# Patient Record
Sex: MALE | Race: WHITE | Employment: FULL TIME | URBAN - METROPOLITAN AREA
[De-identification: names, ages, dates, MRNs, and addresses within clinical notes are randomized per-mention and may not be internally consistent; named-entity substitution may affect disease eponyms.]

---

## 2017-01-03 ENCOUNTER — TELEPHONE (OUTPATIENT)
Dept: BARIATRICS/WEIGHT MGMT | Age: 42
End: 2017-01-03

## 2017-01-27 ENCOUNTER — TELEPHONE (OUTPATIENT)
Dept: BARIATRICS/WEIGHT MGMT | Age: 42
End: 2017-01-27

## 2017-02-03 ENCOUNTER — OFFICE VISIT (OUTPATIENT)
Dept: BARIATRICS/WEIGHT MGMT | Age: 42
End: 2017-02-03

## 2017-02-03 VITALS
WEIGHT: 315 LBS | DIASTOLIC BLOOD PRESSURE: 78 MMHG | SYSTOLIC BLOOD PRESSURE: 136 MMHG | HEART RATE: 100 BPM | HEIGHT: 68 IN | BODY MASS INDEX: 47.74 KG/M2

## 2017-02-03 DIAGNOSIS — E66.01 MORBID OBESITY WITH BMI OF 50.0-59.9, ADULT (HCC): Primary | ICD-10-CM

## 2017-02-03 DIAGNOSIS — F33.41 RECURRENT MAJOR DEPRESSIVE DISORDER, IN PARTIAL REMISSION (HCC): ICD-10-CM

## 2017-02-03 DIAGNOSIS — I10 HTN (HYPERTENSION), BENIGN: ICD-10-CM

## 2017-02-03 DIAGNOSIS — E78.5 HYPERLIPIDEMIA, UNSPECIFIED HYPERLIPIDEMIA TYPE: ICD-10-CM

## 2017-02-03 DIAGNOSIS — G47.33 OBSTRUCTIVE SLEEP APNEA (ADULT) (PEDIATRIC): ICD-10-CM

## 2017-02-03 DIAGNOSIS — Z79.899 HIGH RISK MEDICATIONS (NOT ANTICOAGULANTS) LONG-TERM USE: ICD-10-CM

## 2017-02-03 PROCEDURE — 93000 ELECTROCARDIOGRAM COMPLETE: CPT | Performed by: FAMILY MEDICINE

## 2017-02-03 PROCEDURE — 99204 OFFICE O/P NEW MOD 45 MIN: CPT | Performed by: FAMILY MEDICINE

## 2017-02-03 ASSESSMENT — ENCOUNTER SYMPTOMS
RESPIRATORY NEGATIVE: 1
BACK PAIN: 1
EYES NEGATIVE: 1
GASTROINTESTINAL NEGATIVE: 1

## 2017-02-07 DIAGNOSIS — I10 HTN (HYPERTENSION), BENIGN: ICD-10-CM

## 2017-02-07 DIAGNOSIS — E78.5 HYPERLIPIDEMIA, UNSPECIFIED HYPERLIPIDEMIA TYPE: ICD-10-CM

## 2017-02-07 DIAGNOSIS — E66.01 MORBID OBESITY WITH BMI OF 50.0-59.9, ADULT (HCC): ICD-10-CM

## 2017-02-07 LAB
A/G RATIO: 1.5 (ref 1.1–2.2)
ALBUMIN SERPL-MCNC: 4 G/DL (ref 3.4–5)
ALP BLD-CCNC: 103 U/L (ref 40–129)
ALT SERPL-CCNC: 56 U/L (ref 10–40)
ANION GAP SERPL CALCULATED.3IONS-SCNC: 14 MMOL/L (ref 3–16)
AST SERPL-CCNC: 31 U/L (ref 15–37)
BASOPHILS ABSOLUTE: 0.1 K/UL (ref 0–0.2)
BASOPHILS RELATIVE PERCENT: 0.7 %
BILIRUB SERPL-MCNC: 0.4 MG/DL (ref 0–1)
BUN BLDV-MCNC: 19 MG/DL (ref 7–20)
CALCIUM SERPL-MCNC: 9.7 MG/DL (ref 8.3–10.6)
CHLORIDE BLD-SCNC: 99 MMOL/L (ref 99–110)
CHOLESTEROL, TOTAL: 184 MG/DL (ref 0–199)
CO2: 24 MMOL/L (ref 21–32)
CREAT SERPL-MCNC: 0.9 MG/DL (ref 0.9–1.3)
EOSINOPHILS ABSOLUTE: 0.2 K/UL (ref 0–0.6)
EOSINOPHILS RELATIVE PERCENT: 2.1 %
FOLATE: 11.37 NG/ML (ref 4.78–24.2)
GFR AFRICAN AMERICAN: >60
GFR NON-AFRICAN AMERICAN: >60
GLOBULIN: 2.6 G/DL
GLUCOSE BLD-MCNC: 105 MG/DL (ref 70–99)
HCT VFR BLD CALC: 41.1 % (ref 40.5–52.5)
HDLC SERPL-MCNC: 40 MG/DL (ref 40–60)
HEMOGLOBIN: 13.5 G/DL (ref 13.5–17.5)
LDL CHOLESTEROL CALCULATED: 119 MG/DL
LYMPHOCYTES ABSOLUTE: 1.7 K/UL (ref 1–5.1)
LYMPHOCYTES RELATIVE PERCENT: 17.3 %
MCH RBC QN AUTO: 28.2 PG (ref 26–34)
MCHC RBC AUTO-ENTMCNC: 32.8 G/DL (ref 31–36)
MCV RBC AUTO: 86 FL (ref 80–100)
MONOCYTES ABSOLUTE: 0.6 K/UL (ref 0–1.3)
MONOCYTES RELATIVE PERCENT: 6.2 %
NEUTROPHILS ABSOLUTE: 7.2 K/UL (ref 1.7–7.7)
NEUTROPHILS RELATIVE PERCENT: 73.7 %
PDW BLD-RTO: 16.8 % (ref 12.4–15.4)
PLATELET # BLD: 262 K/UL (ref 135–450)
PMV BLD AUTO: 7.7 FL (ref 5–10.5)
POTASSIUM SERPL-SCNC: 4.1 MMOL/L (ref 3.5–5.1)
RBC # BLD: 4.78 M/UL (ref 4.2–5.9)
SODIUM BLD-SCNC: 137 MMOL/L (ref 136–145)
TOTAL PROTEIN: 6.6 G/DL (ref 6.4–8.2)
TRIGL SERPL-MCNC: 125 MG/DL (ref 0–150)
TSH REFLEX: 3.01 UIU/ML (ref 0.27–4.2)
VITAMIN B-12: 485 PG/ML (ref 211–911)
VITAMIN D 25-HYDROXY: 8.6 NG/ML
VLDLC SERPL CALC-MCNC: 25 MG/DL
WBC # BLD: 9.7 K/UL (ref 4–11)

## 2017-02-08 LAB
ESTIMATED AVERAGE GLUCOSE: 122.6 MG/DL
HBA1C MFR BLD: 5.9 %

## 2017-02-16 ENCOUNTER — TELEPHONE (OUTPATIENT)
Dept: BARIATRICS/WEIGHT MGMT | Age: 42
End: 2017-02-16

## 2017-02-17 DIAGNOSIS — F41.8 DEPRESSION WITH ANXIETY: ICD-10-CM

## 2017-02-23 ENCOUNTER — OFFICE VISIT (OUTPATIENT)
Dept: BARIATRICS/WEIGHT MGMT | Age: 42
End: 2017-02-23

## 2017-02-23 VITALS
WEIGHT: 315 LBS | SYSTOLIC BLOOD PRESSURE: 128 MMHG | HEART RATE: 88 BPM | HEIGHT: 68 IN | BODY MASS INDEX: 47.74 KG/M2 | DIASTOLIC BLOOD PRESSURE: 82 MMHG

## 2017-02-23 DIAGNOSIS — F33.41 RECURRENT MAJOR DEPRESSIVE DISORDER, IN PARTIAL REMISSION (HCC): ICD-10-CM

## 2017-02-23 DIAGNOSIS — E66.01 MORBID OBESITY WITH BMI OF 50.0-59.9, ADULT (HCC): Primary | ICD-10-CM

## 2017-02-23 DIAGNOSIS — R73.03 PREDIABETES: ICD-10-CM

## 2017-02-23 DIAGNOSIS — E55.9 VITAMIN D DEFICIENCY: ICD-10-CM

## 2017-02-23 DIAGNOSIS — I10 HTN (HYPERTENSION), BENIGN: ICD-10-CM

## 2017-02-23 PROCEDURE — 99214 OFFICE O/P EST MOD 30 MIN: CPT | Performed by: FAMILY MEDICINE

## 2017-02-23 ASSESSMENT — ENCOUNTER SYMPTOMS
RESPIRATORY NEGATIVE: 1
GASTROINTESTINAL NEGATIVE: 1
EYES NEGATIVE: 1

## 2017-03-10 ENCOUNTER — OFFICE VISIT (OUTPATIENT)
Dept: BARIATRICS/WEIGHT MGMT | Age: 42
End: 2017-03-10

## 2017-03-10 VITALS
HEIGHT: 68 IN | HEART RATE: 97 BPM | WEIGHT: 315 LBS | SYSTOLIC BLOOD PRESSURE: 130 MMHG | DIASTOLIC BLOOD PRESSURE: 82 MMHG | RESPIRATION RATE: 16 BRPM | BODY MASS INDEX: 47.74 KG/M2

## 2017-03-10 DIAGNOSIS — F33.41 RECURRENT MAJOR DEPRESSIVE DISORDER, IN PARTIAL REMISSION (HCC): ICD-10-CM

## 2017-03-10 DIAGNOSIS — I10 HTN (HYPERTENSION), BENIGN: ICD-10-CM

## 2017-03-10 DIAGNOSIS — E66.01 MORBID OBESITY WITH BMI OF 50.0-59.9, ADULT (HCC): Primary | ICD-10-CM

## 2017-03-10 PROCEDURE — 99214 OFFICE O/P EST MOD 30 MIN: CPT | Performed by: FAMILY MEDICINE

## 2017-03-10 ASSESSMENT — ENCOUNTER SYMPTOMS
GASTROINTESTINAL NEGATIVE: 1
EYES NEGATIVE: 1
RESPIRATORY NEGATIVE: 1

## 2017-04-04 ENCOUNTER — OFFICE VISIT (OUTPATIENT)
Dept: BARIATRICS/WEIGHT MGMT | Age: 42
End: 2017-04-04

## 2017-04-04 VITALS
HEART RATE: 100 BPM | DIASTOLIC BLOOD PRESSURE: 82 MMHG | WEIGHT: 315 LBS | SYSTOLIC BLOOD PRESSURE: 136 MMHG | BODY MASS INDEX: 47.74 KG/M2 | HEIGHT: 68 IN

## 2017-04-04 DIAGNOSIS — Z71.3 DIETARY COUNSELING AND SURVEILLANCE: ICD-10-CM

## 2017-04-04 DIAGNOSIS — I10 HTN (HYPERTENSION), BENIGN: ICD-10-CM

## 2017-04-04 DIAGNOSIS — E66.01 MORBID OBESITY WITH BMI OF 45.0-49.9, ADULT (HCC): Primary | ICD-10-CM

## 2017-04-04 PROCEDURE — 99213 OFFICE O/P EST LOW 20 MIN: CPT | Performed by: FAMILY MEDICINE

## 2017-04-04 ASSESSMENT — ENCOUNTER SYMPTOMS
RESPIRATORY NEGATIVE: 1
GASTROINTESTINAL NEGATIVE: 1
EYES NEGATIVE: 1

## 2017-06-01 ENCOUNTER — OFFICE VISIT (OUTPATIENT)
Dept: FAMILY MEDICINE CLINIC | Age: 42
End: 2017-06-01

## 2017-06-01 ENCOUNTER — TELEPHONE (OUTPATIENT)
Dept: FAMILY MEDICINE CLINIC | Age: 42
End: 2017-06-01

## 2017-06-01 VITALS
OXYGEN SATURATION: 98 % | RESPIRATION RATE: 16 BRPM | TEMPERATURE: 97.9 F | WEIGHT: 315 LBS | HEART RATE: 118 BPM | SYSTOLIC BLOOD PRESSURE: 126 MMHG | BODY MASS INDEX: 47.74 KG/M2 | DIASTOLIC BLOOD PRESSURE: 80 MMHG | HEIGHT: 68 IN

## 2017-06-01 DIAGNOSIS — I10 ESSENTIAL HYPERTENSION: Primary | Chronic | ICD-10-CM

## 2017-06-01 DIAGNOSIS — E66.01 MORBID OBESITY DUE TO EXCESS CALORIES (HCC): ICD-10-CM

## 2017-06-01 DIAGNOSIS — F41.8 DEPRESSION WITH ANXIETY: ICD-10-CM

## 2017-06-01 DIAGNOSIS — E55.9 VITAMIN D INSUFFICIENCY: ICD-10-CM

## 2017-06-01 DIAGNOSIS — R73.03 PRE-DIABETES: ICD-10-CM

## 2017-06-01 DIAGNOSIS — E78.5 HYPERLIPIDEMIA, UNSPECIFIED HYPERLIPIDEMIA TYPE: Chronic | ICD-10-CM

## 2017-06-01 PROCEDURE — 99214 OFFICE O/P EST MOD 30 MIN: CPT | Performed by: FAMILY MEDICINE

## 2017-06-01 RX ORDER — LOSARTAN POTASSIUM AND HYDROCHLOROTHIAZIDE 25; 100 MG/1; MG/1
TABLET ORAL
Qty: 90 TABLET | Refills: 1 | Status: SHIPPED | OUTPATIENT
Start: 2017-06-01 | End: 2017-12-08 | Stop reason: SDUPTHER

## 2017-06-01 RX ORDER — MULTIVIT-MIN/IRON/FOLIC ACID/K 18-600-40
CAPSULE ORAL
Qty: 30 CAPSULE | Refills: 0
Start: 2017-06-01 | End: 2019-04-26 | Stop reason: SDUPTHER

## 2017-06-01 RX ORDER — BUPROPION HYDROCHLORIDE 150 MG/1
150 TABLET, EXTENDED RELEASE ORAL 2 TIMES DAILY
Qty: 180 TABLET | Refills: 1 | Status: SHIPPED | OUTPATIENT
Start: 2017-06-01 | End: 2018-01-02 | Stop reason: SDUPTHER

## 2017-06-02 ENCOUNTER — TELEPHONE (OUTPATIENT)
Dept: BARIATRICS/WEIGHT MGMT | Age: 42
End: 2017-06-02

## 2017-06-02 PROBLEM — E66.01 MORBID OBESITY DUE TO EXCESS CALORIES (HCC): Status: ACTIVE | Noted: 2017-06-02

## 2017-06-02 ASSESSMENT — ENCOUNTER SYMPTOMS
VISUAL CHANGE: 0
SHORTNESS OF BREATH: 0
ORTHOPNEA: 0
BLURRED VISION: 0
ABDOMINAL PAIN: 0

## 2017-06-22 ENCOUNTER — OFFICE VISIT (OUTPATIENT)
Dept: FAMILY MEDICINE CLINIC | Age: 42
End: 2017-06-22

## 2017-06-22 VITALS
WEIGHT: 315 LBS | TEMPERATURE: 97.6 F | OXYGEN SATURATION: 98 % | DIASTOLIC BLOOD PRESSURE: 86 MMHG | HEART RATE: 96 BPM | BODY MASS INDEX: 47.74 KG/M2 | HEIGHT: 68 IN | RESPIRATION RATE: 16 BRPM | SYSTOLIC BLOOD PRESSURE: 134 MMHG

## 2017-06-22 DIAGNOSIS — A04.72 C. DIFFICILE DIARRHEA: Primary | ICD-10-CM

## 2017-06-22 PROCEDURE — 99214 OFFICE O/P EST MOD 30 MIN: CPT | Performed by: FAMILY MEDICINE

## 2017-06-22 RX ORDER — METRONIDAZOLE 500 MG/1
500 TABLET ORAL 3 TIMES DAILY
COMMUNITY
End: 2018-01-17 | Stop reason: ALTCHOICE

## 2017-06-22 ASSESSMENT — ENCOUNTER SYMPTOMS
SHORTNESS OF BREATH: 0
WHEEZING: 0
TROUBLE SWALLOWING: 0
ABDOMINAL PAIN: 1
COUGH: 0
NAUSEA: 0
VOMITING: 0
CONSTIPATION: 0
ANAL BLEEDING: 0
BLOOD IN STOOL: 0
CHEST TIGHTNESS: 0
VOICE CHANGE: 0
DIARRHEA: 1
BACK PAIN: 0
ABDOMINAL DISTENTION: 0
FLATUS: 1
BLOATING: 1

## 2017-08-25 ENCOUNTER — TELEPHONE (OUTPATIENT)
Dept: BARIATRICS/WEIGHT MGMT | Age: 42
End: 2017-08-25

## 2017-08-25 ENCOUNTER — TELEPHONE (OUTPATIENT)
Dept: FAMILY MEDICINE CLINIC | Age: 42
End: 2017-08-25

## 2017-08-29 ENCOUNTER — OFFICE VISIT (OUTPATIENT)
Dept: FAMILY MEDICINE CLINIC | Age: 42
End: 2017-08-29

## 2017-08-29 VITALS
HEIGHT: 67 IN | WEIGHT: 315 LBS | DIASTOLIC BLOOD PRESSURE: 82 MMHG | TEMPERATURE: 97.9 F | HEART RATE: 104 BPM | SYSTOLIC BLOOD PRESSURE: 136 MMHG | OXYGEN SATURATION: 98 % | BODY MASS INDEX: 49.44 KG/M2 | RESPIRATION RATE: 16 BRPM

## 2017-08-29 DIAGNOSIS — R60.9 EDEMA, UNSPECIFIED TYPE: ICD-10-CM

## 2017-08-29 DIAGNOSIS — F43.21 GRIEVING: ICD-10-CM

## 2017-08-29 DIAGNOSIS — E55.9 VITAMIN D DEFICIENCY: ICD-10-CM

## 2017-08-29 DIAGNOSIS — I10 BENIGN ESSENTIAL HTN: Primary | ICD-10-CM

## 2017-08-29 DIAGNOSIS — R73.03 PRE-DIABETES: ICD-10-CM

## 2017-08-29 DIAGNOSIS — Z11.4 SCREENING FOR HIV WITHOUT PRESENCE OF RISK FACTORS: ICD-10-CM

## 2017-08-29 DIAGNOSIS — Z23 NEED FOR PROPHYLACTIC VACCINATION AGAINST DIPHTHERIA-TETANUS-PERTUSSIS (DTP): ICD-10-CM

## 2017-08-29 LAB
ANION GAP SERPL CALCULATED.3IONS-SCNC: 16 MMOL/L (ref 3–16)
BUN BLDV-MCNC: 20 MG/DL (ref 7–20)
CALCIUM SERPL-MCNC: 9.9 MG/DL (ref 8.3–10.6)
CHLORIDE BLD-SCNC: 101 MMOL/L (ref 99–110)
CO2: 24 MMOL/L (ref 21–32)
CREAT SERPL-MCNC: 0.9 MG/DL (ref 0.9–1.3)
GFR AFRICAN AMERICAN: >60
GFR NON-AFRICAN AMERICAN: >60
GLUCOSE BLD-MCNC: 125 MG/DL (ref 70–99)
POTASSIUM SERPL-SCNC: 4.2 MMOL/L (ref 3.5–5.1)
SODIUM BLD-SCNC: 141 MMOL/L (ref 136–145)
VITAMIN D 25-HYDROXY: 27.5 NG/ML

## 2017-08-29 PROCEDURE — 90715 TDAP VACCINE 7 YRS/> IM: CPT | Performed by: FAMILY MEDICINE

## 2017-08-29 PROCEDURE — 99214 OFFICE O/P EST MOD 30 MIN: CPT | Performed by: FAMILY MEDICINE

## 2017-08-29 PROCEDURE — 90471 IMMUNIZATION ADMIN: CPT | Performed by: FAMILY MEDICINE

## 2017-08-29 RX ORDER — FUROSEMIDE 20 MG/1
20 TABLET ORAL DAILY
Qty: 20 TABLET | Refills: 3 | Status: SHIPPED | OUTPATIENT
Start: 2017-08-29 | End: 2018-01-17 | Stop reason: ALTCHOICE

## 2017-08-29 ASSESSMENT — ENCOUNTER SYMPTOMS
ORTHOPNEA: 0
SHORTNESS OF BREATH: 0
CHANGE IN BOWEL HABIT: 0
ABDOMINAL PAIN: 0
BLURRED VISION: 0
VOMITING: 0

## 2017-08-30 LAB
ESTIMATED AVERAGE GLUCOSE: 114 MG/DL
HBA1C MFR BLD: 5.6 %
HIV-1 AND HIV-2 ANTIBODIES: NORMAL

## 2017-11-03 ENCOUNTER — TELEPHONE (OUTPATIENT)
Dept: FAMILY MEDICINE CLINIC | Age: 42
End: 2017-11-03

## 2017-11-03 NOTE — TELEPHONE ENCOUNTER
Pt has been informed. He will try Brat diet first, he will call back in morning at 8:30 if not feeling any better. Pt was informed of Saturday hrs.

## 2017-11-04 ENCOUNTER — OFFICE VISIT (OUTPATIENT)
Dept: FAMILY MEDICINE CLINIC | Age: 42
End: 2017-11-04

## 2017-11-04 VITALS
HEART RATE: 106 BPM | HEIGHT: 68 IN | DIASTOLIC BLOOD PRESSURE: 80 MMHG | SYSTOLIC BLOOD PRESSURE: 138 MMHG | OXYGEN SATURATION: 98 % | WEIGHT: 315 LBS | BODY MASS INDEX: 47.74 KG/M2 | TEMPERATURE: 98.9 F

## 2017-11-04 DIAGNOSIS — A08.4 VIRAL GASTROENTERITIS: Primary | ICD-10-CM

## 2017-11-04 PROCEDURE — 99214 OFFICE O/P EST MOD 30 MIN: CPT | Performed by: FAMILY MEDICINE

## 2017-11-04 ASSESSMENT — ENCOUNTER SYMPTOMS
SHORTNESS OF BREATH: 0
DIARRHEA: 1
FLATUS: 0
ABDOMINAL PAIN: 0
COLOR CHANGE: 0
ANAL BLEEDING: 0
COUGH: 0
NAUSEA: 1
ABDOMINAL DISTENTION: 0
BLOATING: 0
BLOOD IN STOOL: 0
VOMITING: 1

## 2017-11-04 NOTE — PROGRESS NOTES
Subjective:      Patient ID: Kailyn Ramey is a 40 y.o. male. Diarrhea    This is a new problem. Episode onset: 3 days. The problem occurs 2 to 4 times per day. The problem has been gradually improving. The stool consistency is described as watery. The patient states that diarrhea does not awaken him from sleep. Associated symptoms include a fever, myalgias and vomiting (one episode nbnb). Pertinent negatives include no abdominal pain, arthralgias, bloating, chills, coughing, headaches, increased  flatus, sweats, URI or weight loss. Nothing aggravates the symptoms. Risk factors include ill contacts (hx of c diff diarrhea). He has tried nothing for the symptoms. Review of Systems   Constitutional: Positive for fever. Negative for activity change, appetite change, chills and weight loss. Respiratory: Negative for cough and shortness of breath. Cardiovascular: Negative for palpitations. Gastrointestinal: Positive for diarrhea, nausea and vomiting (one episode nbnb). Negative for abdominal distention, abdominal pain, anal bleeding, bloating, blood in stool and flatus. Musculoskeletal: Positive for myalgias. Negative for arthralgias. Skin: Negative for color change and pallor. Neurological: Negative for headaches. Psychiatric/Behavioral: Negative for sleep disturbance. has No Known Allergies.       Current Outpatient Prescriptions:     metFORMIN (GLUCOPHAGE) 500 MG tablet, Take 1 tablet by mouth daily (with breakfast), Disp: 90 tablet, Rfl: 1    buPROPion (WELLBUTRIN SR) 150 MG extended release tablet, Take 1 tablet by mouth 2 times daily, Disp: 180 tablet, Rfl: 1    sertraline (ZOLOFT) 50 MG tablet, TAKE 1 TABLET BY MOUTH DAILY, Disp: 90 tablet, Rfl: 1    losartan-hydrochlorothiazide (HYZAAR) 100-25 MG per tablet, TAKE 1 TABLET BY MOUTH DAILY, Disp: 90 tablet, Rfl: 1    Cholecalciferol (VITAMIN D) 2000 UNITS CAPS capsule, One po qd, Disp: 30 capsule, Rfl: 0    ibuprofen (ADVIL;MOTRIN) 600 MG tablet, Take 1 tablet by mouth every 8 hours as needed for Pain, Disp: 60 tablet, Rfl: 0    Probiotic Product (PROBIOTIC DAILY PO), Take by mouth, Disp: , Rfl:     furosemide (LASIX) 20 MG tablet, Take 1 tablet by mouth daily for 20 days, Disp: 20 tablet, Rfl: 3    metroNIDAZOLE (FLAGYL) 500 MG tablet, Take 500 mg by mouth 3 times daily, Disp: , Rfl:     Phentermine-Topiramate (QSYMIA) 7.5-46 MG CP24, One poqd, Disp: 14 capsule, Rfl: 0    vitamin D (CHOLECALCIFEROL) 63786 UNIT CAPS, Take 1 capsule by mouth once a week for 8 doses, Disp: 8 capsule, Rfl: 0    fluticasone (FLONASE) 50 MCG/ACT nasal spray, 2 sprays by Nasal route daily, Disp: 1 Bottle, Rfl: 3    ketoprofen (ORUDIS) 75 MG capsule, Take 1 capsule by mouth 3 times daily as needed for Pain, Disp: 45 capsule, Rfl: 0     has a past medical history of Abdominal hernia; Alcohol abuse; Anxiety; Clostridium difficile diarrhea; Clostridium difficile diarrhea; DVT of axillary vein, acute left ([address] Hernia; History of blood transfusion; Hyperlipemia; Hypertension; Kidney congenitally absent, left; Kidney disease; Lightheaded; Obesity; Obstructive sleep apnea (adult) (pediatric); Renal agenesis; and Severe single current episode of major depressive disorder, without psychotic features ([address] Past Surgical History:   Procedure Laterality Date    HERNIA REPAIR      2011    OTHER SURGICAL HISTORY  4/20/2015    EXCISIONAL DEBRIDEMENT AND IRRIGATION OF ABDOMINAL WOUND    TONSILLECTOMY AND ADENOIDECTOMY      VENTRAL HERNIA REPAIR  3/23/15    with mesh    VENTRAL HERNIA REPAIR  6/2/16        reports that he quit smoking about 5 years ago. He has a 2.00 pack-year smoking history. He has never used smokeless tobacco. He reports that he does not drink alcohol or use drugs. family history includes Cancer in his father; Coronary Art Dis (age of onset: 36) in his mother; High Blood Pressure in his mother and sister;  Other in his mother and sister; Stroke (age of onset: 27) in his mother. Objective:   Physical Exam   Constitutional: He is oriented to person, place, and time. He appears well-developed and well-nourished. No distress. Morbidly obese WM     HENT:   Head: Normocephalic. Mouth/Throat: Oropharynx is clear and moist. No oropharyngeal exudate. Eyes: Conjunctivae and EOM are normal. Pupils are equal, round, and reactive to light. No scleral icterus. Neck: Neck supple. No thyromegaly present. Cardiovascular: Normal rate, regular rhythm, normal heart sounds and intact distal pulses. Exam reveals no gallop and no friction rub. No murmur heard. Pulmonary/Chest: Effort normal and breath sounds normal. No respiratory distress. He has no wheezes. He has no rales. He exhibits no tenderness. Abdominal: Soft. Bowel sounds are normal. He exhibits no distension and no mass. There is tenderness (mild diffuse ttp, no distention). There is no rebound and no guarding. Genitourinary: Penis normal. No penile tenderness. Musculoskeletal: Normal range of motion. Lymphadenopathy:     He has no cervical adenopathy. Neurological: He is alert and oriented to person, place, and time. No cranial nerve deficit. Coordination normal.   Skin: No rash noted. He is not diaphoretic. No pallor. Psychiatric: He has a normal mood and affect. His behavior is normal. Thought content normal.   Nursing note and vitals reviewed. Assessment:       1. Viral gastroenteritis             Plan:      BRAt diet  Increase fluids  No sx of c . Diff may try pepto bismolprn  Patient to call if symptoms persist or worsen.

## 2017-12-08 DIAGNOSIS — F41.8 DEPRESSION WITH ANXIETY: ICD-10-CM

## 2017-12-08 RX ORDER — LOSARTAN POTASSIUM AND HYDROCHLOROTHIAZIDE 25; 100 MG/1; MG/1
TABLET ORAL
Qty: 90 TABLET | Refills: 0 | Status: SHIPPED | OUTPATIENT
Start: 2017-12-08 | End: 2018-01-17 | Stop reason: SDUPTHER

## 2018-01-02 DIAGNOSIS — F41.8 DEPRESSION WITH ANXIETY: ICD-10-CM

## 2018-01-02 RX ORDER — BUPROPION HYDROCHLORIDE 150 MG/1
TABLET, EXTENDED RELEASE ORAL
Qty: 180 TABLET | Refills: 0 | Status: SHIPPED | OUTPATIENT
Start: 2018-01-02 | End: 2018-01-17 | Stop reason: SDUPTHER

## 2018-01-17 ENCOUNTER — OFFICE VISIT (OUTPATIENT)
Dept: FAMILY MEDICINE CLINIC | Age: 43
End: 2018-01-17

## 2018-01-17 VITALS
HEART RATE: 108 BPM | BODY MASS INDEX: 47.74 KG/M2 | RESPIRATION RATE: 16 BRPM | SYSTOLIC BLOOD PRESSURE: 142 MMHG | DIASTOLIC BLOOD PRESSURE: 90 MMHG | OXYGEN SATURATION: 98 % | HEIGHT: 68 IN | TEMPERATURE: 97.1 F | WEIGHT: 315 LBS

## 2018-01-17 DIAGNOSIS — M25.50 ARTHRALGIA, UNSPECIFIED JOINT: Primary | ICD-10-CM

## 2018-01-17 DIAGNOSIS — F41.8 DEPRESSION WITH ANXIETY: ICD-10-CM

## 2018-01-17 DIAGNOSIS — M79.10 MYALGIA: ICD-10-CM

## 2018-01-17 DIAGNOSIS — I10 ESSENTIAL HYPERTENSION: Chronic | ICD-10-CM

## 2018-01-17 LAB
ANISOCYTOSIS: ABNORMAL
BANDED NEUTROPHILS RELATIVE PERCENT: 1 % (ref 0–7)
BASOPHILS ABSOLUTE: 0.1 K/UL (ref 0–0.2)
BASOPHILS RELATIVE PERCENT: 1 %
EOSINOPHILS ABSOLUTE: 0.3 K/UL (ref 0–0.6)
EOSINOPHILS RELATIVE PERCENT: 3 %
HCT VFR BLD CALC: 33.9 % (ref 40.5–52.5)
HEMOGLOBIN: 10.6 G/DL (ref 13.5–17.5)
LYMPHOCYTES ABSOLUTE: 1.3 K/UL (ref 1–5.1)
LYMPHOCYTES RELATIVE PERCENT: 12 %
MCH RBC QN AUTO: 24 PG (ref 26–34)
MCHC RBC AUTO-ENTMCNC: 31.4 G/DL (ref 31–36)
MCV RBC AUTO: 76.5 FL (ref 80–100)
MONOCYTES ABSOLUTE: 0.2 K/UL (ref 0–1.3)
MONOCYTES RELATIVE PERCENT: 2 %
MYELOCYTE PERCENT: 1 %
NEUTROPHILS ABSOLUTE: 8.9 K/UL (ref 1.7–7.7)
NEUTROPHILS RELATIVE PERCENT: 80 %
PDW BLD-RTO: 17.5 % (ref 12.4–15.4)
PLATELET # BLD: 345 K/UL (ref 135–450)
PMV BLD AUTO: 7.6 FL (ref 5–10.5)
POLYCHROMASIA: ABNORMAL
RBC # BLD: 4.43 M/UL (ref 4.2–5.9)
SEDIMENTATION RATE, ERYTHROCYTE: 42 MM/HR (ref 0–15)
TOTAL CK: 54 U/L (ref 39–308)
URIC ACID, SERUM: 8.6 MG/DL (ref 3.5–7.2)
WBC # BLD: 10.9 K/UL (ref 4–11)

## 2018-01-17 PROCEDURE — 99214 OFFICE O/P EST MOD 30 MIN: CPT | Performed by: FAMILY MEDICINE

## 2018-01-17 RX ORDER — LOSARTAN POTASSIUM AND HYDROCHLOROTHIAZIDE 25; 100 MG/1; MG/1
TABLET ORAL
Qty: 90 TABLET | Refills: 1 | Status: SHIPPED | OUTPATIENT
Start: 2018-01-17 | End: 2018-07-17 | Stop reason: SDUPTHER

## 2018-01-17 RX ORDER — METHYLPREDNISOLONE 4 MG/1
TABLET ORAL
Qty: 1 KIT | Refills: 0 | Status: SHIPPED | OUTPATIENT
Start: 2018-01-17 | End: 2018-01-23

## 2018-01-17 RX ORDER — BUPROPION HYDROCHLORIDE 150 MG/1
TABLET, EXTENDED RELEASE ORAL
Qty: 180 TABLET | Refills: 1 | Status: SHIPPED | OUTPATIENT
Start: 2018-01-17 | End: 2018-07-17 | Stop reason: SDUPTHER

## 2018-01-17 ASSESSMENT — ENCOUNTER SYMPTOMS
SWOLLEN GLANDS: 0
ABDOMINAL PAIN: 0
VISUAL CHANGE: 0
VOMITING: 0
COUGH: 0
BACK PAIN: 1
SORE THROAT: 0
NAUSEA: 0

## 2018-01-17 NOTE — PROGRESS NOTES
Product (PROBIOTIC DAILY PO), Take by mouth, Disp: , Rfl:     vitamin D (CHOLECALCIFEROL) 30212 UNIT CAPS, Take 1 capsule by mouth once a week for 8 doses, Disp: 8 capsule, Rfl: 0     has a past medical history of Abdominal hernia; Alcohol abuse; Anxiety; Clostridium difficile diarrhea; Clostridium difficile diarrhea; DVT of axillary vein, acute left ([facility] [address] Hernia; History of blood transfusion; Hyperlipemia; Hypertension; Kidney congenitally absent, left; Kidney disease; Lightheaded; Obesity; Obstructive sleep apnea (adult) (pediatric); Renal agenesis; and Severe single current episode of major depressive disorder, without psychotic features ([facility] [address] Past Surgical History:   Procedure Laterality Date    HERNIA REPAIR      2011    OTHER SURGICAL HISTORY  4/20/2015    EXCISIONAL DEBRIDEMENT AND IRRIGATION OF ABDOMINAL WOUND    TONSILLECTOMY AND ADENOIDECTOMY      VENTRAL HERNIA REPAIR  3/23/15    with mesh    VENTRAL HERNIA REPAIR  6/2/16        reports that he quit smoking about 5 years ago. He has a 2.00 pack-year smoking history. He has never used smokeless tobacco. He reports that he does not drink alcohol or use drugs. family history includes Cancer in his father; Coronary Art Dis (age of onset: 36) in his mother; High Blood Pressure in his mother and sister; Other in his mother and sister; Stroke (age of onset: 27) in his mother. Objective:   Physical Exam   Constitutional: He is oriented to person, place, and time. He appears well-developed and well-nourished. No distress. HENT:   Head: Normocephalic. Mouth/Throat: Oropharynx is clear and moist. No oropharyngeal exudate. Eyes: Conjunctivae are normal. Pupils are equal, round, and reactive to light. No scleral icterus. Neck: Normal range of motion. Neck supple. No carotid bruits     Cardiovascular: Normal rate, regular rhythm, normal heart sounds and intact distal pulses. Exam reveals no friction rub. No murmur heard.   No edema Pulmonary/Chest: Effort normal and breath sounds normal. No respiratory distress. He has no wheezes. He has no rales. He exhibits no tenderness. Musculoskeletal:        Right shoulder: He exhibits normal range of motion, no tenderness, no bony tenderness, no pain, no spasm and normal strength. Left shoulder: He exhibits normal range of motion, no tenderness, no bony tenderness, no pain, no spasm and normal strength. Right elbow: He exhibits normal range of motion, no swelling and no effusion. No tenderness found. No radial head tenderness noted. Left elbow: He exhibits normal range of motion, no swelling and no effusion. No tenderness found. No radial head tenderness noted. Right wrist: He exhibits normal range of motion, no tenderness and no bony tenderness. Left wrist: He exhibits normal range of motion, no tenderness and no bony tenderness. Cervical back: He exhibits normal range of motion, no tenderness, no bony tenderness, no pain and no spasm. Lymphadenopathy:     He has no cervical adenopathy. Neurological: He is alert and oriented to person, place, and time. No cranial nerve deficit. Coordination normal.   Skin: Skin is warm. No rash noted. He is not diaphoretic. No erythema. Psychiatric: He has a normal mood and affect. His behavior is normal.   Nursing note and vitals reviewed. Assessment:      1. Arthralgia, unspecified joint  CBC Auto Differential    Sedimentation rate, automated    URIC ACID   2. Depression with anxiety  buPROPion (WELLBUTRIN SR) 150 MG extended release tablet    sertraline (ZOLOFT) 50 MG tablet   3. Myalgia  CBC Auto Differential    CK   4. Essential hypertension             Plan:      1. His exam is not remarkable  Post viral sx? Get labs  Fail nsaids trial with medrol   Fu 2 weeks prn     2. Refills    3. See # 1    4.  Did not took med this am, refills  Continue same medications no changes needed at this time   Encourage compliance with medication, life style changes  Fu 3 mo

## 2018-01-23 ENCOUNTER — TELEPHONE (OUTPATIENT)
Dept: FAMILY MEDICINE CLINIC | Age: 43
End: 2018-01-23

## 2018-02-19 ENCOUNTER — OFFICE VISIT (OUTPATIENT)
Dept: FAMILY MEDICINE CLINIC | Age: 43
End: 2018-02-19

## 2018-02-19 ENCOUNTER — HOSPITAL ENCOUNTER (OUTPATIENT)
Dept: OTHER | Age: 43
Discharge: HOME | End: 2018-02-19
Attending: FAMILY MEDICINE | Admitting: FAMILY MEDICINE

## 2018-02-19 VITALS
BODY MASS INDEX: 49.44 KG/M2 | HEIGHT: 67 IN | SYSTOLIC BLOOD PRESSURE: 126 MMHG | OXYGEN SATURATION: 98 % | HEART RATE: 88 BPM | DIASTOLIC BLOOD PRESSURE: 86 MMHG | TEMPERATURE: 98 F | RESPIRATION RATE: 16 BRPM | WEIGHT: 315 LBS

## 2018-02-19 DIAGNOSIS — M25.512 CHRONIC LEFT SHOULDER PAIN: ICD-10-CM

## 2018-02-19 DIAGNOSIS — H69.81 ETD (EUSTACHIAN TUBE DYSFUNCTION), RIGHT: ICD-10-CM

## 2018-02-19 DIAGNOSIS — M25.521 ELBOW PAIN, RIGHT: ICD-10-CM

## 2018-02-19 DIAGNOSIS — G89.29 CHRONIC LEFT SHOULDER PAIN: ICD-10-CM

## 2018-02-19 DIAGNOSIS — R05.9 COUGH: Primary | ICD-10-CM

## 2018-02-19 DIAGNOSIS — D64.9 ANEMIA, UNSPECIFIED TYPE: ICD-10-CM

## 2018-02-19 LAB
ANISOCYTOSIS: ABNORMAL
BASOPHILS ABSOLUTE: 0 K/UL (ref 0–0.2)
BASOPHILS RELATIVE PERCENT: 0 %
BURR CELLS: ABNORMAL
EOSINOPHILS ABSOLUTE: 0.1 K/UL (ref 0–0.6)
EOSINOPHILS RELATIVE PERCENT: 1 %
HCT VFR BLD CALC: 34.2 % (ref 40.5–52.5)
HEMOGLOBIN: 10.4 G/DL (ref 13.5–17.5)
IMMATURE RETIC FRACT: 0.54 (ref 0.21–0.37)
LYMPHOCYTES ABSOLUTE: 2.2 K/UL (ref 1–5.1)
LYMPHOCYTES RELATIVE PERCENT: 18 %
MCH RBC QN AUTO: 22.6 PG (ref 26–34)
MCHC RBC AUTO-ENTMCNC: 30.5 G/DL (ref 31–36)
MCV RBC AUTO: 74 FL (ref 80–100)
MICROCYTES: ABNORMAL
MONOCYTES ABSOLUTE: 1 K/UL (ref 0–1.3)
MONOCYTES RELATIVE PERCENT: 8 %
MYELOCYTE PERCENT: 1 %
NEUTROPHILS ABSOLUTE: 8.8 K/UL (ref 1.7–7.7)
NEUTROPHILS RELATIVE PERCENT: 72 %
PDW BLD-RTO: 18.1 % (ref 12.4–15.4)
PLATELET # BLD: 401 K/UL (ref 135–450)
PLATELET SLIDE REVIEW: ADEQUATE
PMV BLD AUTO: 7.8 FL (ref 5–10.5)
RBC # BLD: 4.62 M/UL (ref 4.2–5.9)
RETICULOCYTE ABSOLUTE COUNT: 0.09 M/UL
RETICULOCYTE COUNT PCT: 2 % (ref 0.5–2.18)
SLIDE REVIEW: ABNORMAL
TEAR DROP CELLS: ABNORMAL
WBC # BLD: 12 K/UL (ref 4–11)

## 2018-02-19 PROCEDURE — 99214 OFFICE O/P EST MOD 30 MIN: CPT | Performed by: FAMILY MEDICINE

## 2018-02-19 ASSESSMENT — ENCOUNTER SYMPTOMS
HEMOPTYSIS: 0
SHORTNESS OF BREATH: 0
RHINORRHEA: 0
SORE THROAT: 0
HEARTBURN: 0
COUGH: 1
ABDOMINAL PAIN: 0

## 2018-02-20 LAB
FERRITIN: 11.8 NG/ML (ref 30–400)
IRON SATURATION: 9 % (ref 20–50)
IRON: 38 UG/DL (ref 59–158)
TOTAL IRON BINDING CAPACITY: 447 UG/DL (ref 260–445)

## 2018-04-03 ENCOUNTER — HOSPITAL ENCOUNTER (OUTPATIENT)
Dept: ENDOSCOPY | Age: 43
Discharge: HOME | End: 2018-04-03
Attending: INTERNAL MEDICINE | Admitting: INTERNAL MEDICINE

## 2018-04-03 VITALS
SYSTOLIC BLOOD PRESSURE: 154 MMHG | RESPIRATION RATE: 18 BRPM | HEART RATE: 94 BPM | TEMPERATURE: 97.1 F | BODY MASS INDEX: 49.44 KG/M2 | OXYGEN SATURATION: 98 % | DIASTOLIC BLOOD PRESSURE: 76 MMHG | WEIGHT: 315 LBS | HEIGHT: 67 IN

## 2018-04-03 LAB
GLUCOSE BLD-MCNC: 104 MG/DL (ref 70–99)
PERFORMED ON: ABNORMAL

## 2018-04-03 RX ORDER — FERROUS SULFATE 325(65) MG
65 TABLET ORAL
COMMUNITY
End: 2019-06-17

## 2018-04-17 ENCOUNTER — OFFICE VISIT (OUTPATIENT)
Dept: FAMILY MEDICINE CLINIC | Age: 43
End: 2018-04-17

## 2018-04-17 VITALS
BODY MASS INDEX: 49.44 KG/M2 | OXYGEN SATURATION: 98 % | RESPIRATION RATE: 16 BRPM | HEART RATE: 88 BPM | DIASTOLIC BLOOD PRESSURE: 90 MMHG | SYSTOLIC BLOOD PRESSURE: 146 MMHG | WEIGHT: 315 LBS | HEIGHT: 67 IN | TEMPERATURE: 97.2 F

## 2018-04-17 DIAGNOSIS — R53.82 CHRONIC FATIGUE: ICD-10-CM

## 2018-04-17 DIAGNOSIS — I10 ESSENTIAL HYPERTENSION: Primary | Chronic | ICD-10-CM

## 2018-04-17 DIAGNOSIS — R63.5 WEIGHT GAIN: ICD-10-CM

## 2018-04-17 DIAGNOSIS — D50.9 IRON DEFICIENCY ANEMIA, UNSPECIFIED IRON DEFICIENCY ANEMIA TYPE: ICD-10-CM

## 2018-04-17 LAB
HCT VFR BLD CALC: 37.8 % (ref 40.5–52.5)
HEMOGLOBIN: 11.9 G/DL (ref 13.5–17.5)
IRON SATURATION: 32 % (ref 20–50)
IRON: 150 UG/DL (ref 59–158)
MCH RBC QN AUTO: 23.8 PG (ref 26–34)
MCHC RBC AUTO-ENTMCNC: 31.4 G/DL (ref 31–36)
MCV RBC AUTO: 75.9 FL (ref 80–100)
PDW BLD-RTO: 22.1 % (ref 12.4–15.4)
PLATELET # BLD: 319 K/UL (ref 135–450)
PMV BLD AUTO: 7.8 FL (ref 5–10.5)
RBC # BLD: 4.98 M/UL (ref 4.2–5.9)
T3 TOTAL: 1.47 NG/ML (ref 0.8–2)
T4 FREE: 0.9 NG/DL (ref 0.9–1.8)
TOTAL IRON BINDING CAPACITY: 469 UG/DL (ref 260–445)
TSH SERPL DL<=0.05 MIU/L-ACNC: 2.31 UIU/ML (ref 0.27–4.2)
WBC # BLD: 10.7 K/UL (ref 4–11)

## 2018-04-17 PROCEDURE — 99214 OFFICE O/P EST MOD 30 MIN: CPT | Performed by: FAMILY MEDICINE

## 2018-04-17 RX ORDER — AMLODIPINE BESYLATE 2.5 MG/1
2.5 TABLET ORAL DAILY
Qty: 30 TABLET | Refills: 3 | Status: SHIPPED | OUTPATIENT
Start: 2018-04-17 | End: 2018-07-17 | Stop reason: SDUPTHER

## 2018-04-17 ASSESSMENT — ENCOUNTER SYMPTOMS
SORE THROAT: 0
VOMITING: 0
COUGH: 0
VISUAL CHANGE: 0
BLURRED VISION: 0
ABDOMINAL PAIN: 0
SHORTNESS OF BREATH: 0
NAUSEA: 0
CHANGE IN BOWEL HABIT: 0
ORTHOPNEA: 0

## 2018-04-17 ASSESSMENT — PATIENT HEALTH QUESTIONNAIRE - PHQ9
SUM OF ALL RESPONSES TO PHQ9 QUESTIONS 1 & 2: 0
SUM OF ALL RESPONSES TO PHQ QUESTIONS 1-9: 0
SUM OF ALL RESPONSES TO PHQ QUESTIONS 1-9: 0
SUM OF ALL RESPONSES TO PHQ9 QUESTIONS 1 & 2: 0
2. FEELING DOWN, DEPRESSED OR HOPELESS: 0
2. FEELING DOWN, DEPRESSED OR HOPELESS: 0
1. LITTLE INTEREST OR PLEASURE IN DOING THINGS: 0
1. LITTLE INTEREST OR PLEASURE IN DOING THINGS: 0

## 2018-04-19 LAB
SEX HORMONE BINDING GLOBULIN: 46 NMOL/L (ref 11–80)
TESTOSTERONE FREE-NONMALE: 52.1 PG/ML (ref 47–244)
TESTOSTERONE TOTAL: 325 NG/DL (ref 220–1000)

## 2018-05-01 ENCOUNTER — HOSPITAL ENCOUNTER (OUTPATIENT)
Dept: PHYSICAL THERAPY | Age: 43
Discharge: HOME | End: 2018-05-31
Attending: FAMILY MEDICINE | Admitting: FAMILY MEDICINE

## 2018-05-03 ENCOUNTER — HOSPITAL ENCOUNTER (OUTPATIENT)
Dept: PHYSICAL THERAPY | Age: 43
Discharge: HOME | End: 2018-05-04
Admitting: FAMILY MEDICINE

## 2018-05-03 ENCOUNTER — HOSPITAL ENCOUNTER (OUTPATIENT)
Dept: PHYSICAL THERAPY | Age: 43
Discharge: HOME | End: 2018-04-30
Admitting: FAMILY MEDICINE

## 2018-05-14 ENCOUNTER — TELEPHONE (OUTPATIENT)
Dept: FAMILY MEDICINE CLINIC | Age: 43
End: 2018-05-14

## 2018-06-01 ENCOUNTER — HOSPITAL ENCOUNTER (OUTPATIENT)
Dept: PHYSICAL THERAPY | Age: 43
Discharge: HOME | End: 2018-06-30
Attending: FAMILY MEDICINE | Admitting: FAMILY MEDICINE

## 2018-07-17 ENCOUNTER — OFFICE VISIT (OUTPATIENT)
Dept: FAMILY MEDICINE CLINIC | Age: 43
End: 2018-07-17

## 2018-07-17 VITALS
DIASTOLIC BLOOD PRESSURE: 76 MMHG | RESPIRATION RATE: 16 BRPM | OXYGEN SATURATION: 98 % | BODY MASS INDEX: 47.74 KG/M2 | WEIGHT: 315 LBS | HEART RATE: 94 BPM | HEIGHT: 68 IN | TEMPERATURE: 97.9 F | SYSTOLIC BLOOD PRESSURE: 124 MMHG

## 2018-07-17 DIAGNOSIS — F41.8 DEPRESSION WITH ANXIETY: ICD-10-CM

## 2018-07-17 DIAGNOSIS — E66.01 MORBID OBESITY WITH BMI OF 50.0-59.9, ADULT (HCC): ICD-10-CM

## 2018-07-17 DIAGNOSIS — R73.03 PRE-DIABETES: ICD-10-CM

## 2018-07-17 DIAGNOSIS — I10 ESSENTIAL HYPERTENSION: Primary | Chronic | ICD-10-CM

## 2018-07-17 DIAGNOSIS — G47.33 OBSTRUCTIVE SLEEP APNEA: ICD-10-CM

## 2018-07-17 DIAGNOSIS — R80.9 MICROALBUMINURIA: ICD-10-CM

## 2018-07-17 DIAGNOSIS — E78.5 HYPERLIPIDEMIA, UNSPECIFIED HYPERLIPIDEMIA TYPE: Chronic | ICD-10-CM

## 2018-07-17 DIAGNOSIS — D64.9 ANEMIA, UNSPECIFIED TYPE: ICD-10-CM

## 2018-07-17 DIAGNOSIS — Z72.0 TOBACCO ABUSE: ICD-10-CM

## 2018-07-17 PROBLEM — A08.4 VIRAL GASTROENTERITIS: Status: RESOLVED | Noted: 2017-11-04 | Resolved: 2018-07-17

## 2018-07-17 LAB
A/G RATIO: 1.8 (ref 1.1–2.2)
ALBUMIN SERPL-MCNC: 4.2 G/DL (ref 3.4–5)
ALP BLD-CCNC: 102 U/L (ref 40–129)
ALT SERPL-CCNC: 27 U/L (ref 10–40)
ANION GAP SERPL CALCULATED.3IONS-SCNC: 16 MMOL/L (ref 3–16)
AST SERPL-CCNC: 13 U/L (ref 15–37)
BASOPHILS ABSOLUTE: 0.1 K/UL (ref 0–0.2)
BASOPHILS RELATIVE PERCENT: 0.5 %
BILIRUB SERPL-MCNC: <0.2 MG/DL (ref 0–1)
BUN BLDV-MCNC: 19 MG/DL (ref 7–20)
CALCIUM SERPL-MCNC: 9.9 MG/DL (ref 8.3–10.6)
CHLORIDE BLD-SCNC: 99 MMOL/L (ref 99–110)
CHOLESTEROL, TOTAL: 166 MG/DL (ref 0–199)
CO2: 24 MMOL/L (ref 21–32)
CREAT SERPL-MCNC: 0.9 MG/DL (ref 0.9–1.3)
CREATININE URINE: 124.2 MG/DL (ref 39–259)
EOSINOPHILS ABSOLUTE: 0.2 K/UL (ref 0–0.6)
EOSINOPHILS RELATIVE PERCENT: 1.8 %
GFR AFRICAN AMERICAN: >60
GFR NON-AFRICAN AMERICAN: >60
GLOBULIN: 2.3 G/DL
GLUCOSE BLD-MCNC: 103 MG/DL (ref 70–99)
HCT VFR BLD CALC: 40.1 % (ref 40.5–52.5)
HDLC SERPL-MCNC: 42 MG/DL (ref 40–60)
HEMOGLOBIN: 13 G/DL (ref 13.5–17.5)
IRON SATURATION: 15 % (ref 20–50)
IRON: 63 UG/DL (ref 59–158)
LDL CHOLESTEROL CALCULATED: 98 MG/DL
LYMPHOCYTES ABSOLUTE: 1.6 K/UL (ref 1–5.1)
LYMPHOCYTES RELATIVE PERCENT: 14.5 %
MCH RBC QN AUTO: 25.7 PG (ref 26–34)
MCHC RBC AUTO-ENTMCNC: 32.5 G/DL (ref 31–36)
MCV RBC AUTO: 79.1 FL (ref 80–100)
MICROALBUMIN UR-MCNC: 23.2 MG/DL
MICROALBUMIN/CREAT UR-RTO: 186.8 MG/G (ref 0–30)
MONOCYTES ABSOLUTE: 0.7 K/UL (ref 0–1.3)
MONOCYTES RELATIVE PERCENT: 5.9 %
NEUTROPHILS ABSOLUTE: 8.8 K/UL (ref 1.7–7.7)
NEUTROPHILS RELATIVE PERCENT: 77.3 %
PDW BLD-RTO: 18 % (ref 12.4–15.4)
PLATELET # BLD: 313 K/UL (ref 135–450)
PMV BLD AUTO: 7.8 FL (ref 5–10.5)
POTASSIUM SERPL-SCNC: 4.3 MMOL/L (ref 3.5–5.1)
RBC # BLD: 5.08 M/UL (ref 4.2–5.9)
SODIUM BLD-SCNC: 139 MMOL/L (ref 136–145)
TOTAL IRON BINDING CAPACITY: 423 UG/DL (ref 260–445)
TOTAL PROTEIN: 6.5 G/DL (ref 6.4–8.2)
TRIGL SERPL-MCNC: 129 MG/DL (ref 0–150)
TSH SERPL DL<=0.05 MIU/L-ACNC: 2.07 UIU/ML (ref 0.27–4.2)
VLDLC SERPL CALC-MCNC: 26 MG/DL
WBC # BLD: 11.4 K/UL (ref 4–11)

## 2018-07-17 PROCEDURE — 99214 OFFICE O/P EST MOD 30 MIN: CPT | Performed by: FAMILY MEDICINE

## 2018-07-17 RX ORDER — LOSARTAN POTASSIUM AND HYDROCHLOROTHIAZIDE 25; 100 MG/1; MG/1
TABLET ORAL
Qty: 90 TABLET | Refills: 1 | Status: SHIPPED | OUTPATIENT
Start: 2018-07-17 | End: 2019-02-07 | Stop reason: SDUPTHER

## 2018-07-17 RX ORDER — AMLODIPINE BESYLATE 2.5 MG/1
2.5 TABLET ORAL DAILY
Qty: 30 TABLET | Refills: 3 | Status: SHIPPED | OUTPATIENT
Start: 2018-07-17 | End: 2018-12-12 | Stop reason: SDUPTHER

## 2018-07-17 RX ORDER — BUPROPION HYDROCHLORIDE 150 MG/1
TABLET, EXTENDED RELEASE ORAL
Qty: 180 TABLET | Refills: 1 | Status: SHIPPED | OUTPATIENT
Start: 2018-07-17 | End: 2019-04-16 | Stop reason: SDUPTHER

## 2018-07-17 ASSESSMENT — ENCOUNTER SYMPTOMS
SHORTNESS OF BREATH: 0
ORTHOPNEA: 0
BLURRED VISION: 0

## 2018-07-17 NOTE — PROGRESS NOTES
lipids    3. Fu lab    4. Continue c pap    5. Improved  Continue same medications no changes needed at this time     6. Continue metformin  Fu labs  Encourage compliance with medication, life style changes    7. Former smoker  encourage him to continue to abstain    8. Counseling: encourage to adjust caloric and fat  intake to maintain or achieve ideal body weight, to emphasize fruits, vegetables, whole grains, if possible drink  vegetable milks, reduce meats, poultry, fish, and rich in legume like beans,lentus, chickpeas ,  nuts. Exercise is a life-long commitment. 9. Fu lab    Cuong Nolen received counseling on the following healthy behaviors: nutrition, exercise and medication adherence    Patient given educational materials on Nutrition, Exercise and Hypertension    I have instructed Cuong Nolen to complete a self tracking handout on Blood Pressures  and Weights and instructed them to bring it with them to his next appointment. Discussed use, benefit, and side effects of prescribed medications. Barriers to medication compliance addressed. All patient questions answered. Pt voiced understanding.

## 2018-07-18 LAB
ESTIMATED AVERAGE GLUCOSE: 128.4 MG/DL
HBA1C MFR BLD: 6.1 %

## 2018-08-01 ENCOUNTER — TELEPHONE (OUTPATIENT)
Dept: FAMILY MEDICINE CLINIC | Age: 43
End: 2018-08-01

## 2018-08-01 NOTE — TELEPHONE ENCOUNTER
Letter to be excuse for jury Manual Janet. Patient said he don't want to do it because of all the medications he is on and that he can't sit for long.

## 2018-10-25 ENCOUNTER — TELEPHONE (OUTPATIENT)
Dept: FAMILY MEDICINE CLINIC | Age: 43
End: 2018-10-25

## 2018-10-25 NOTE — TELEPHONE ENCOUNTER
Called and informed pt that it is ok for him to stop taking the iron pills per Dr. Ivan Ma. Pt expressed understanding.

## 2018-10-25 NOTE — TELEPHONE ENCOUNTER
[phone]  Clifford Rubio    PT wants to know if he is still supposed to take iron pills? OK to leave voice mail with info. PT last seen 7/18/2018    Please advise patient.

## 2018-11-08 DIAGNOSIS — R73.03 PRE-DIABETES: ICD-10-CM

## 2018-12-12 ENCOUNTER — TELEPHONE (OUTPATIENT)
Dept: FAMILY MEDICINE CLINIC | Age: 43
End: 2018-12-12

## 2018-12-12 DIAGNOSIS — I10 ESSENTIAL HYPERTENSION: Chronic | ICD-10-CM

## 2018-12-12 RX ORDER — AMLODIPINE BESYLATE 2.5 MG/1
TABLET ORAL
Qty: 90 TABLET | Refills: 0 | Status: SHIPPED | OUTPATIENT
Start: 2018-12-12 | End: 2019-04-04 | Stop reason: SDUPTHER

## 2019-02-08 DIAGNOSIS — F41.8 DEPRESSION WITH ANXIETY: ICD-10-CM

## 2019-04-26 ENCOUNTER — OFFICE VISIT (OUTPATIENT)
Dept: FAMILY MEDICINE CLINIC | Age: 44
End: 2019-04-26
Payer: COMMERCIAL

## 2019-04-26 VITALS
WEIGHT: 315 LBS | TEMPERATURE: 97 F | SYSTOLIC BLOOD PRESSURE: 128 MMHG | HEART RATE: 99 BPM | HEIGHT: 68 IN | RESPIRATION RATE: 16 BRPM | BODY MASS INDEX: 47.74 KG/M2 | DIASTOLIC BLOOD PRESSURE: 80 MMHG | OXYGEN SATURATION: 98 %

## 2019-04-26 DIAGNOSIS — I10 ESSENTIAL HYPERTENSION: Primary | Chronic | ICD-10-CM

## 2019-04-26 DIAGNOSIS — R73.03 PRE-DIABETES: ICD-10-CM

## 2019-04-26 DIAGNOSIS — E66.01 MORBID OBESITY (HCC): ICD-10-CM

## 2019-04-26 DIAGNOSIS — Z23 NEED FOR VACCINATION: ICD-10-CM

## 2019-04-26 DIAGNOSIS — E78.5 HYPERLIPIDEMIA, UNSPECIFIED HYPERLIPIDEMIA TYPE: ICD-10-CM

## 2019-04-26 DIAGNOSIS — F41.8 DEPRESSION WITH ANXIETY: ICD-10-CM

## 2019-04-26 DIAGNOSIS — E55.9 VITAMIN D INSUFFICIENCY: ICD-10-CM

## 2019-04-26 LAB
A/G RATIO: 1.4 (ref 1.1–2.2)
ALBUMIN SERPL-MCNC: 3.9 G/DL (ref 3.4–5)
ALP BLD-CCNC: 117 U/L (ref 40–129)
ALT SERPL-CCNC: 38 U/L (ref 10–40)
ANION GAP SERPL CALCULATED.3IONS-SCNC: 14 MMOL/L (ref 3–16)
AST SERPL-CCNC: 18 U/L (ref 15–37)
BILIRUB SERPL-MCNC: 0.3 MG/DL (ref 0–1)
BUN BLDV-MCNC: 16 MG/DL (ref 7–20)
CALCIUM SERPL-MCNC: 9.5 MG/DL (ref 8.3–10.6)
CHLORIDE BLD-SCNC: 102 MMOL/L (ref 99–110)
CHOLESTEROL, TOTAL: 178 MG/DL (ref 0–199)
CO2: 25 MMOL/L (ref 21–32)
CREAT SERPL-MCNC: 1 MG/DL (ref 0.9–1.3)
CREATININE URINE: 235.1 MG/DL (ref 39–259)
ESTIMATED AVERAGE GLUCOSE: 131.2 MG/DL
GFR AFRICAN AMERICAN: >60
GFR NON-AFRICAN AMERICAN: >60
GLOBULIN: 2.8 G/DL
GLUCOSE BLD-MCNC: 121 MG/DL (ref 70–99)
HBA1C MFR BLD: 6.2 %
HDLC SERPL-MCNC: 43 MG/DL (ref 40–60)
LDL CHOLESTEROL CALCULATED: 115 MG/DL
MICROALBUMIN UR-MCNC: 88.6 MG/DL
MICROALBUMIN/CREAT UR-RTO: 376.9 MG/G (ref 0–30)
POTASSIUM SERPL-SCNC: 4.2 MMOL/L (ref 3.5–5.1)
SODIUM BLD-SCNC: 141 MMOL/L (ref 136–145)
TOTAL PROTEIN: 6.7 G/DL (ref 6.4–8.2)
TRIGL SERPL-MCNC: 100 MG/DL (ref 0–150)
VLDLC SERPL CALC-MCNC: 20 MG/DL

## 2019-04-26 PROCEDURE — 99214 OFFICE O/P EST MOD 30 MIN: CPT | Performed by: FAMILY MEDICINE

## 2019-04-26 PROCEDURE — 90471 IMMUNIZATION ADMIN: CPT | Performed by: FAMILY MEDICINE

## 2019-04-26 PROCEDURE — 90732 PPSV23 VACC 2 YRS+ SUBQ/IM: CPT | Performed by: FAMILY MEDICINE

## 2019-04-26 RX ORDER — LOSARTAN POTASSIUM AND HYDROCHLOROTHIAZIDE 25; 100 MG/1; MG/1
TABLET ORAL
Qty: 90 TABLET | Refills: 1 | Status: SHIPPED | OUTPATIENT
Start: 2019-04-26 | End: 2019-11-19 | Stop reason: SDUPTHER

## 2019-04-26 RX ORDER — MULTIVIT-MIN/IRON/FOLIC ACID/K 18-600-40
CAPSULE ORAL
Qty: 90 CAPSULE | Refills: 1 | Status: SHIPPED | OUTPATIENT
Start: 2019-04-26 | End: 2019-11-19 | Stop reason: SDUPTHER

## 2019-04-26 RX ORDER — BUPROPION HYDROCHLORIDE 150 MG/1
TABLET, EXTENDED RELEASE ORAL
Qty: 180 TABLET | Refills: 1 | Status: SHIPPED | OUTPATIENT
Start: 2019-04-26 | End: 2019-11-19 | Stop reason: SDUPTHER

## 2019-04-26 RX ORDER — AMLODIPINE BESYLATE 2.5 MG/1
TABLET ORAL
Qty: 90 TABLET | Refills: 1
Start: 2019-04-26 | End: 2019-06-17

## 2019-04-26 ASSESSMENT — ENCOUNTER SYMPTOMS
VOMITING: 0
CHEST TIGHTNESS: 1
STRIDOR: 0
ABDOMINAL PAIN: 0
BLURRED VISION: 0
NAUSEA: 0
SHORTNESS OF BREATH: 0
BACK PAIN: 0
COUGH: 0
ORTHOPNEA: 0
APNEA: 0
WHEEZING: 0

## 2019-04-26 ASSESSMENT — PATIENT HEALTH QUESTIONNAIRE - PHQ9
2. FEELING DOWN, DEPRESSED OR HOPELESS: 0
SUM OF ALL RESPONSES TO PHQ QUESTIONS 1-9: 0
SUM OF ALL RESPONSES TO PHQ QUESTIONS 1-9: 0
1. LITTLE INTEREST OR PLEASURE IN DOING THINGS: 0
SUM OF ALL RESPONSES TO PHQ9 QUESTIONS 1 & 2: 0

## 2019-04-26 NOTE — PROGRESS NOTES
Subjective:      Patient ID: Christian Le is a 44 y.o. male. Hypertension   This is a chronic problem. The current episode started more than 1 year ago. The problem is unchanged. The problem is controlled. Associated symptoms include anxiety. Pertinent negatives include no blurred vision, chest pain, headaches, malaise/fatigue, neck pain, orthopnea, palpitations, peripheral edema, PND, shortness of breath or sweats. There are no associated agents to hypertension. Risk factors for coronary artery disease include dyslipidemia, diabetes mellitus, obesity, male gender, sedentary lifestyle and stress. Past treatments include angiotensin blockers and calcium channel blockers. Compliance problems include diet and exercise. There is no history of kidney disease. There is no history of sleep apnea. Depression /anxiety  denies crying spells, fatigue, anhedonia, insomnia, suicidal or homicidal ideas,     Pre dm  denies any polyuria/polydipsia/polyphagia/paresthesias/wt gain or loss,     Morbid obesity  Poor adherence to diet and exercise  Was going to nutritional counseling and was doing well    Vit D inuf  Takes supplement every day          Review of Systems   Constitutional: Negative for activity change, diaphoresis, fever and malaise/fatigue. Eyes: Negative for blurred vision and visual disturbance. Respiratory: Positive for chest tightness. Negative for apnea, cough, shortness of breath, wheezing and stridor. Cardiovascular: Negative for chest pain, palpitations, orthopnea, leg swelling and PND. Gastrointestinal: Negative for abdominal pain, nausea and vomiting. Endocrine: Negative for polydipsia, polyphagia and polyuria. Musculoskeletal: Negative for back pain, myalgias, neck pain and neck stiffness. Neurological: Negative for dizziness, weakness, numbness and headaches. Hematological: Negative for adenopathy.    Psychiatric/Behavioral: Negative for behavioral problems, decreased concentration and sleep disturbance. The patient is not nervous/anxious. has No Known Allergies. Current Outpatient Medications:     buPROPion (WELLBUTRIN SR) 150 MG extended release tablet, TAKE 1 TABLET BY MOUTH TWICE DAILY, Disp: 180 tablet, Rfl: 0    amLODIPine (NORVASC) 2.5 MG tablet, TAKE 1 TABLET BY MOUTH ONCE DAILY, Disp: 30 tablet, Rfl: 0    metFORMIN (GLUCOPHAGE) 500 MG tablet, TAKE 1 TABLET BY MOUTH ONCE DAILY WITH BREAKFAST, Disp: 90 tablet, Rfl: 0    losartan-hydrochlorothiazide (HYZAAR) 100-25 MG per tablet, TAKE 1 TABLET BY MOUTH ONCE DAILY, Disp: 90 tablet, Rfl: 0    sertraline (ZOLOFT) 50 MG tablet, TAKE ONE TABLET BY MOUTH ONCE DAILY, Disp: 30 tablet, Rfl: 0    Cholecalciferol (VITAMIN D) 2000 UNITS CAPS capsule, One po qd, Disp: 30 capsule, Rfl: 0    Probiotic Product (PROBIOTIC DAILY PO), Take by mouth, Disp: , Rfl:     ferrous sulfate 325 (65 Fe) MG tablet, Take 65 mg by mouth daily (with breakfast), Disp: , Rfl:      has a past medical history of Abdominal hernia, Alcohol abuse, Anxiety, Clostridium difficile diarrhea, Clostridium difficile diarrhea, DVT of axillary vein, acute left ([facility]), Hernia, History of blood transfusion, Hyperlipemia, Hypertension, Kidney congenitally absent, left, Kidney disease, Lightheaded, Obesity, Obstructive sleep apnea (adult) (pediatric), Renal agenesis, and Severe single current episode of major depressive disorder, without psychotic features ([facility] [address] Past Surgical History:   Procedure Laterality Date    HERNIA REPAIR      2011    OTHER SURGICAL HISTORY  4/20/2015    EXCISIONAL DEBRIDEMENT AND IRRIGATION OF ABDOMINAL WOUND    TONSILLECTOMY AND ADENOIDECTOMY      VENTRAL HERNIA REPAIR  3/23/15    with mesh    VENTRAL HERNIA REPAIR  6/2/16        reports that he quit smoking about 21 months ago. He has a 2.00 pack-year smoking history. He has never used smokeless tobacco. He reports that he does not drink alcohol or use drugs.     family history includes Cancer in his father; Coronary Art Dis (age of onset: 36) in his mother; High Blood Pressure in his mother and sister; Other in his mother and sister; Stroke (age of onset: 27) in his mother. Objective:  Blood pressure 128/80, pulse 99, temperature 97 °F (36.1 °C), temperature source Tympanic, resp. rate 16, height 5' 8\" (1.727 m), weight (!) 373 lb (169.2 kg), SpO2 98 %. Physical Exam   Constitutional: He is oriented to person, place, and time. He appears well-developed and well-nourished. No distress. HENT:   Head: Normocephalic. Nose: Nose normal.   Mouth/Throat: Oropharynx is clear and moist. No oropharyngeal exudate. Eyes: Pupils are equal, round, and reactive to light. Conjunctivae and EOM are normal. No scleral icterus. Neck: Normal range of motion. Neck supple. No JVD present. No tracheal deviation present. No thyromegaly present. Cardiovascular: Normal rate, regular rhythm, S1 normal, S2 normal, normal heart sounds and intact distal pulses. No extrasystoles are present. PMI is not displaced. Exam reveals no gallop. No murmur heard. No systolic murmur is present. No diastolic murmur is present. Pulses:       Carotid pulses are 2+ on the right side, and 2+ on the left side. Radial pulses are 2+ on the right side, and 2+ on the left side. Femoral pulses are 2+ on the right side, and 2+ on the left side. Popliteal pulses are 2+ on the right side, and 2+ on the left side. Dorsalis pedis pulses are 2+ on the right side, and 2+ on the left side. Posterior tibial pulses are 2+ on the right side, and 2+ on the left side. No carotid bruits  No LE edema    Pulmonary/Chest: Effort normal and breath sounds normal. No respiratory distress. He has no wheezes. He has no rales. He exhibits no tenderness. Abdominal: Soft. Bowel sounds are normal. He exhibits no distension. There is no tenderness.    No abd bruit      Musculoskeletal: He exhibits no edema. Lymphadenopathy:     He has no cervical adenopathy. Neurological: He is alert and oriented to person, place, and time. He has normal reflexes. No cranial nerve deficit. He exhibits normal muscle tone. Coordination normal.   Skin: Skin is warm. No rash noted. He is not diaphoretic. No erythema. Psychiatric: He has a normal mood and affect. Nursing note and vitals reviewed. Assessment:       Diagnosis Orders   1. Essential hypertension  losartan-hydrochlorothiazide (HYZAAR) 100-25 MG per tablet    amLODIPine (NORVASC) 2.5 MG tablet    Comprehensive Metabolic Panel    Microalbumin / Creatinine Urine Ratio   2. Depression with anxiety  sertraline (ZOLOFT) 50 MG tablet    buPROPion (WELLBUTRIN SR) 150 MG extended release tablet   3. Pre-diabetes  metFORMIN (GLUCOPHAGE) 500 MG tablet    Comprehensive Metabolic Panel    Hemoglobin A1C    Microalbumin / Creatinine Urine Ratio   4. Vitamin D insufficiency  Cholecalciferol (VITAMIN D) 2000 units CAPS capsule   5. Hyperlipidemia, unspecified hyperlipidemia type  Lipid Panel           Plan:      1. bp looks well controlled  Continue same medications no changes needed at this time   Encourage compliance with medication, life style changes  Refills  Get labs    2. Stable  Continue medical tx    3. a1c for pre dm  Refill metformin  Referral to nutritionist     4. Vit D     5. Check llipid panel    Pneumovax    Michael received counseling on the following healthy behaviors: nutrition, exercise and medication adherence    Patient given educational materials on Diabetes, Hyperlipidemia, Nutrition, Exercise and Hypertension    I have instructed Wallace Nino to complete a self tracking handout on Blood Pressures  and Weights and instructed them to bring it with them to his next appointment. Discussed use, benefit, and side effects of prescribed medications. Barriers to medication compliance addressed. All patient questions answered.   Pt voiced understanding.            Claudetta Parsley, MD

## 2019-04-28 ENCOUNTER — HOSPITAL ENCOUNTER (EMERGENCY)
Age: 44
Discharge: HOME | End: 2019-04-28
Attending: EMERGENCY MEDICINE
Payer: COMMERCIAL

## 2019-04-28 VITALS
OXYGEN SATURATION: 96 % | BODY MASS INDEX: 47.74 KG/M2 | HEART RATE: 95 BPM | DIASTOLIC BLOOD PRESSURE: 88 MMHG | HEIGHT: 68 IN | WEIGHT: 315 LBS | TEMPERATURE: 98.2 F | SYSTOLIC BLOOD PRESSURE: 154 MMHG

## 2019-04-28 DIAGNOSIS — M54.50 ACUTE LEFT-SIDED LOW BACK PAIN WITHOUT SCIATICA: Primary | ICD-10-CM

## 2019-04-28 PROCEDURE — 99283 EMERGENCY DEPT VISIT LOW MDM: CPT

## 2019-04-28 PROCEDURE — 6370000000 HC RX 637 (ALT 250 FOR IP): Performed by: NURSE PRACTITIONER

## 2019-04-28 RX ORDER — LIDOCAINE 4 G/G
1 PATCH TOPICAL ONCE
Status: DISCONTINUED | OUTPATIENT
Start: 2019-04-28 | End: 2019-04-28 | Stop reason: HOSPADM

## 2019-04-28 RX ORDER — LIDOCAINE 50 MG/G
1 PATCH TOPICAL DAILY
Qty: 10 PATCH | Refills: 0 | Status: SHIPPED | OUTPATIENT
Start: 2019-04-28 | End: 2021-03-11 | Stop reason: CLARIF

## 2019-04-28 RX ORDER — ACETAMINOPHEN 325 MG/1
650 TABLET ORAL ONCE
Status: COMPLETED | OUTPATIENT
Start: 2019-04-28 | End: 2019-04-28

## 2019-04-28 RX ORDER — ACETAMINOPHEN 325 MG/1
325 TABLET ORAL EVERY 6 HOURS PRN
Qty: 60 TABLET | Refills: 0 | Status: SHIPPED | OUTPATIENT
Start: 2019-04-28 | End: 2021-03-22 | Stop reason: ALTCHOICE

## 2019-04-28 RX ORDER — CYCLOBENZAPRINE HCL 10 MG
10 TABLET ORAL 3 TIMES DAILY PRN
Qty: 10 TABLET | Refills: 0 | Status: SHIPPED | OUTPATIENT
Start: 2019-04-28 | End: 2019-05-08

## 2019-04-28 RX ORDER — LIDOCAINE 50 MG/G
1 PATCH TOPICAL ONCE
Status: DISCONTINUED | OUTPATIENT
Start: 2019-04-28 | End: 2019-04-28 | Stop reason: SDUPTHER

## 2019-04-28 RX ORDER — OXYCODONE HYDROCHLORIDE 5 MG/1
5 TABLET ORAL ONCE
Status: COMPLETED | OUTPATIENT
Start: 2019-04-28 | End: 2019-04-28

## 2019-04-28 RX ADMIN — OXYCODONE HYDROCHLORIDE 5 MG: 5 TABLET ORAL at 09:30

## 2019-04-28 RX ADMIN — ACETAMINOPHEN 650 MG: 325 TABLET ORAL at 09:30

## 2019-04-28 ASSESSMENT — PAIN SCALES - GENERAL: PAINLEVEL_OUTOF10: 5

## 2019-04-28 ASSESSMENT — ENCOUNTER SYMPTOMS
BACK PAIN: 1
SHORTNESS OF BREATH: 0
VOMITING: 0
NAUSEA: 0
EYES NEGATIVE: 1
DIARRHEA: 0
CHEST TIGHTNESS: 0
ABDOMINAL PAIN: 0

## 2019-04-28 ASSESSMENT — PAIN DESCRIPTION - ORIENTATION: ORIENTATION: LOWER

## 2019-04-28 ASSESSMENT — PAIN DESCRIPTION - PAIN TYPE: TYPE: ACUTE PAIN

## 2019-04-28 ASSESSMENT — PAIN DESCRIPTION - FREQUENCY: FREQUENCY: CONTINUOUS

## 2019-04-28 ASSESSMENT — PAIN DESCRIPTION - DESCRIPTORS: DESCRIPTORS: ACHING;DISCOMFORT;STABBING

## 2019-04-28 NOTE — ED TRIAGE NOTES
Pt presents to to ED with lower back pain. Pt tried to get out of bed yesterday am and flet something \"pop\". Denies numbness/tingling down lower ext.

## 2019-04-28 NOTE — ED PROVIDER NOTES
weakness, light-headedness, numbness and headaches. Hematological: Negative. Psychiatric/Behavioral: Negative for hallucinations and suicidal ideas. Past Medical, Surgical, Family, and Social History     Medical History:   Past Medical History:   Diagnosis Date    Abdominal hernia     Alcohol abuse 11/12/2016    Anxiety     Clostridium difficile diarrhea 4/8/15    Clostridium difficile diarrhea 6/19/15    DVT of axillary vein, acute left ([address]     Hernia     History of blood transfusion     Hyperlipemia 5/17/2013    Hypertension     Kidney congenitally absent, left     Kidney disease     BORN WITHOUT LEFT KIDNEY NO PROBLEMS    Lightheaded     Obesity 5/17/2013    Obstructive sleep apnea (adult) (pediatric)     Renal agenesis     Severe single current episode of major depressive disorder, without psychotic features ([facility] [address] [address] 11/12/2016       Surgical History:   Past Surgical History:   Procedure Laterality Date    HERNIA REPAIR      2011    OTHER SURGICAL HISTORY  4/20/2015    EXCISIONAL DEBRIDEMENT AND IRRIGATION OF ABDOMINAL WOUND    TONSILLECTOMY AND ADENOIDECTOMY      VENTRAL HERNIA REPAIR  3/23/15    with mesh    VENTRAL HERNIA REPAIR  6/2/16       Social History: He reports that he quit smoking about 21 months ago. He has a 2.00 pack-year smoking history. He has never used smokeless tobacco. He reports that he does not drink alcohol or use drugs. Family History: His family history includes Cancer in his father; Coronary Art Dis (age of onset: 36) in his mother; High Blood Pressure in his mother and sister; Other in his mother and sister; Stroke (age of onset: 27) in his mother.     Medications     Discharge Medication List as of 4/28/2019 10:07 AM      CONTINUE these medications which have NOT CHANGED    Details   sertraline (ZOLOFT) 50 MG tablet TAKE ONE TABLET BY MOUTH ONCE DAILY, Disp-90 tablet, R-1Normal      metFORMIN (GLUCOPHAGE) 500 MG tablet TAKE 1 TABLET BY MOUTH ONCE Sensation intact to medial/lateral tibia, great toe web space and lateral ankle. Distal pulses strong and equal bilaterally. Neuro: A&O x 4.  Normal speech without dysarthria or aphasia. Moves all extremities spontaneously and symmetrically. Gait normal without ataxia. Skin: Warm, dry. No obvious rashes, petechiae, or purpura. Psych: Appropriate mood and affect, normal interaction. Diagnostic Results     RECENT VITALS:  BP: (!) 154/88, Temp: 98.2 °F (36.8 °C), Pulse: 95,  , SpO2: 96 %     Procedures     None     ED Course     Nursing Notes, Past Medical Hx, Past Surgical Hx, Social Hx, Allergies, and Family Hx were reviewed. The patient was given the following medications:  Orders Placed This Encounter   Medications    DISCONTD: lidocaine (LIDODERM) 5 % 1 patch     Order Specific Question:   Please select a reason the therapeutic interchange was not accepted: Answer:   James Ocampo for Pharmacy to Substitute    acetaminophen (TYLENOL) tablet 650 mg    oxyCODONE (ROXICODONE) immediate release tablet 5 mg    lidocaine 4 % external patch 1 patch     Order Specific Question:   Please select a reason the therapeutic interchange was not accepted: Answer:   James Ocampo for Pharmacy to Substitute    lidocaine (LIDODERM) 5 %     Sig: Place 1 patch onto the skin daily 12 hours on, 12 hours off. Dispense:  10 patch     Refill:  0    cyclobenzaprine (FLEXERIL) 10 MG tablet     Sig: Take 1 tablet by mouth 3 times daily as needed for Muscle spasms     Dispense:  10 tablet     Refill:  0    acetaminophen (TYLENOL) 325 MG tablet     Sig: Take 1 tablet by mouth every 6 hours as needed for Pain     Dispense:  60 tablet     Refill:  0            CONSULTS:  None    MEDICAL DECISION MAKING / ASSESSMENT / PLAN     Briefly, Jerry Benoit is a 44 y.o. male who presented to the emergency department with back pain. On presentation, patient was well-appearing and in no acute distress.  Patient is afebrile with stable VS. his exam is unremarkable with the exception of some left lumbar paraspinous muscle tenderness without spasm. No pain in the midline. He has a good strength and good sensation to his lower extremities. He has no red flags on history or physical to make me concern for cauda equina syndrome, epidural abscess, discitis, osteomyelitis, vertebral fracture, epidural hematoma, cord impingement. I had a lengthy discussion with patient about inspected management, pain control with Tylenol, lidocaine patches, and Flexeril, and close follow-up with his primary doctor. I explained to him that imaging was not indicated today but if he has persistent symptoms his PCP may wish to obtain imaging at a further date. At this point in time, patient is stable for discharge. Patient was given strict return precautions as outlined in the AVS. Patient was agreeable and understanding to this plan of care. Prior to discharge, patient was ambulatory and PO tolerant. I saw this patient independently as the advanced practice provider. Clinical Impression     1.  Acute left-sided low back pain without sciatica        Disposition     DC    DISCHARGE MEDICATIONS:  Discharge Medication List as of 4/28/2019 10:07 AM      START taking these medications    Details   lidocaine (LIDODERM) 5 % Place 1 patch onto the skin daily 12 hours on, 12 hours off., Disp-10 patch, R-0Print      cyclobenzaprine (FLEXERIL) 10 MG tablet Take 1 tablet by mouth 3 times daily as needed for Muscle spasms, Disp-10 tablet, R-0Print      acetaminophen (TYLENOL) 325 MG tablet Take 1 tablet by mouth every 6 hours as needed for Pain, Disp-60 tablet, R-0Print             PATIENT REFERRED TO:  [facility] Emergency Department  [address] [facility]    As needed, If symptoms worsen    Heladio Thorpe MD  [address] [facility] [address] [facility]    Schedule an appointment as soon as possible for a visit         Marce Garcia CNP  Department of Emergency Medicine     Marce Garcia [facility]  04/28/19 [id number]

## 2019-04-29 ENCOUNTER — TELEPHONE (OUTPATIENT)
Dept: FAMILY MEDICINE CLINIC | Age: 44
End: 2019-04-29

## 2019-04-29 DIAGNOSIS — R80.9 POSITIVE FOR MACROALBUMINURIA: Primary | ICD-10-CM

## 2019-05-02 ENCOUNTER — TELEPHONE (OUTPATIENT)
Dept: FAMILY MEDICINE CLINIC | Age: 44
End: 2019-05-02

## 2019-05-02 NOTE — TELEPHONE ENCOUNTER
[phone]      Tevin Higuera    PT was seen at [facility] for back pain on 4/28 (Sunday). Wants to know if Dr Ren Muñoz will write a script for something to help him. He was put on muscle relaxers and strong Tylenol.   PT cannot take anti [address] [place] [address], [address] [facility] [place] [phone]    Last seen 4/26/2018

## 2019-05-03 ENCOUNTER — OFFICE VISIT (OUTPATIENT)
Dept: FAMILY MEDICINE CLINIC | Age: 44
End: 2019-05-03
Payer: COMMERCIAL

## 2019-05-03 VITALS
TEMPERATURE: 97.8 F | OXYGEN SATURATION: 96 % | BODY MASS INDEX: 47.74 KG/M2 | HEART RATE: 96 BPM | SYSTOLIC BLOOD PRESSURE: 136 MMHG | WEIGHT: 315 LBS | HEIGHT: 68 IN | RESPIRATION RATE: 16 BRPM | DIASTOLIC BLOOD PRESSURE: 82 MMHG

## 2019-05-03 DIAGNOSIS — S39.012A STRAIN OF LUMBAR REGION, INITIAL ENCOUNTER: Primary | ICD-10-CM

## 2019-05-03 PROCEDURE — 99214 OFFICE O/P EST MOD 30 MIN: CPT | Performed by: FAMILY MEDICINE

## 2019-05-03 RX ORDER — TRAMADOL HYDROCHLORIDE 50 MG/1
50 TABLET ORAL EVERY 8 HOURS PRN
Qty: 9 TABLET | Refills: 0 | Status: SHIPPED | OUTPATIENT
Start: 2019-05-03 | End: 2019-05-06

## 2019-05-03 ASSESSMENT — ENCOUNTER SYMPTOMS
CONSTIPATION: 0
BACK PAIN: 1
RECTAL PAIN: 0
ABDOMINAL PAIN: 0
CHEST TIGHTNESS: 0
SHORTNESS OF BREATH: 0

## 2019-06-17 ENCOUNTER — HOSPITAL ENCOUNTER (EMERGENCY)
Age: 44
Discharge: HOME | End: 2019-06-17
Attending: EMERGENCY MEDICINE
Payer: COMMERCIAL

## 2019-06-17 VITALS
DIASTOLIC BLOOD PRESSURE: 89 MMHG | TEMPERATURE: 98.2 F | HEIGHT: 68 IN | RESPIRATION RATE: 18 BRPM | SYSTOLIC BLOOD PRESSURE: 168 MMHG | WEIGHT: 315 LBS | OXYGEN SATURATION: 96 % | HEART RATE: 92 BPM | BODY MASS INDEX: 47.74 KG/M2

## 2019-06-17 DIAGNOSIS — M79.89 LEG SWELLING: Primary | ICD-10-CM

## 2019-06-17 LAB
ANION GAP SERPL CALCULATED.3IONS-SCNC: 9 MMOL/L (ref 3–16)
APTT: 34.2 SEC (ref 26–36)
BASOPHILS ABSOLUTE: 0 K/UL (ref 0–0.2)
BASOPHILS RELATIVE PERCENT: 0 %
BUN BLDV-MCNC: 20 MG/DL (ref 7–20)
CALCIUM SERPL-MCNC: 9.6 MG/DL (ref 8.3–10.6)
CHLORIDE BLD-SCNC: 102 MMOL/L (ref 99–110)
CO2: 28 MMOL/L (ref 21–32)
CREAT SERPL-MCNC: 1 MG/DL (ref 0.9–1.3)
EOSINOPHILS ABSOLUTE: 0.1 K/UL (ref 0–0.6)
EOSINOPHILS RELATIVE PERCENT: 1 %
GFR AFRICAN AMERICAN: >60
GFR NON-AFRICAN AMERICAN: >60
GLUCOSE BLD-MCNC: 120 MG/DL (ref 70–99)
HCT VFR BLD CALC: 40.6 % (ref 40.5–52.5)
HEMOGLOBIN: 13.2 G/DL (ref 13.5–17.5)
INR BLD: 0.98 (ref 0.86–1.14)
LYMPHOCYTES ABSOLUTE: 1.4 K/UL (ref 1–5.1)
LYMPHOCYTES RELATIVE PERCENT: 17 %
MCH RBC QN AUTO: 27.3 PG (ref 26–34)
MCHC RBC AUTO-ENTMCNC: 32.6 G/DL (ref 31–36)
MCV RBC AUTO: 83.9 FL (ref 80–100)
MONOCYTES ABSOLUTE: 0.3 K/UL (ref 0–1.3)
MONOCYTES RELATIVE PERCENT: 4 %
NEUTROPHILS ABSOLUTE: 6.6 K/UL (ref 1.7–7.7)
NEUTROPHILS RELATIVE PERCENT: 78 %
PDW BLD-RTO: 16.7 % (ref 12.4–15.4)
PLATELET # BLD: 262 K/UL (ref 135–450)
PMV BLD AUTO: 7.2 FL (ref 5–10.5)
POTASSIUM SERPL-SCNC: 4.1 MMOL/L (ref 3.5–5.1)
PROTHROMBIN TIME: 11.2 SEC (ref 9.8–13)
RBC # BLD: 4.84 M/UL (ref 4.2–5.9)
SODIUM BLD-SCNC: 139 MMOL/L (ref 136–145)
WBC # BLD: 8.5 K/UL (ref 4–11)

## 2019-06-17 PROCEDURE — 85025 COMPLETE CBC W/AUTO DIFF WBC: CPT

## 2019-06-17 PROCEDURE — 80048 BASIC METABOLIC PNL TOTAL CA: CPT

## 2019-06-17 PROCEDURE — 93971 EXTREMITY STUDY: CPT

## 2019-06-17 PROCEDURE — 85610 PROTHROMBIN TIME: CPT

## 2019-06-17 PROCEDURE — 99284 EMERGENCY DEPT VISIT MOD MDM: CPT

## 2019-06-17 PROCEDURE — 85730 THROMBOPLASTIN TIME PARTIAL: CPT

## 2019-06-17 RX ORDER — MULTIVIT WITH MINERALS/LUTEIN
250 TABLET ORAL DAILY
COMMUNITY

## 2019-06-17 NOTE — ED PROVIDER NOTES
his mother and sister; Stroke (age of onset: 27) in his mother. He reports that he quit smoking about 23 months ago. He has a 2.00 pack-year smoking history. He has never used smokeless tobacco. He reports that he does not drink alcohol or use drugs. Medications     Previous Medications    ACETAMINOPHEN (TYLENOL) 325 MG TABLET    Take 1 tablet by mouth every 6 hours as needed for Pain    AMLODIPINE (NORVASC) 2.5 MG TABLET    TAKE 1 TABLET BY MOUTH ONCE DAILY    ASCORBIC ACID (VITAMIN C) 250 MG TABLET    Take 250 mg by mouth daily    BUPROPION (WELLBUTRIN SR) 150 MG EXTENDED RELEASE TABLET    One po bid    CHOLECALCIFEROL (VITAMIN D) 2000 UNITS CAPS CAPSULE    One po qd    LIDOCAINE (LIDODERM) 5 %    Place 1 patch onto the skin daily 12 hours on, 12 hours off. LOSARTAN-HYDROCHLOROTHIAZIDE (HYZAAR) 100-25 MG PER TABLET    TAKE 1 TABLET BY MOUTH ONCE DAILY    METFORMIN (GLUCOPHAGE) 500 MG TABLET    TAKE 1 TABLET BY MOUTH ONCE DAILY WITH BREAKFAST    PROBIOTIC PRODUCT (PROBIOTIC DAILY PO)    Take by mouth    SERTRALINE (ZOLOFT) 50 MG TABLET    TAKE ONE TABLET BY MOUTH ONCE DAILY       Allergies     He has No Known Allergies. Physical Exam     INITIAL VITALS: BP: (!) 193/80, Temp: 98.2 °F (36.8 °C), Pulse: 91, Resp: 20, SpO2: 98 %   General: Well-appearing 43-year-old male sitting in bed no apparent cardiorespiratory distress  HEENT:  head is atraumatic, pupils equal round and reactive to light, sclera are clear, oropharynx is nonerythematous  Neck: supple, no lymphadenopathy  Chest: clear to auscultation bilaterally with no wheezes rhonchi, rales  Cardiovascular: Regular, rate, and rhythm, normal S1S2, no murmurs, rubs, or gallops, 2+ radial pulses bilaterally, capillary refill 2 seconds  Abdominal: Soft, nontender, nondistended, positive bowel sounds throughout, no rebound or guarding  Skin: Warm, dry well perfused, no rashes  Extremities:  The left lower extremity is more swollen than the right lower extremity with a mild area of erythema overlying the left anterior shin which is not particularly warm no other rashes or lesions are noted no external signs of trauma  Neurologic:  alert and oriented x4, speech is clear and intact without dysarthria, gait is intact    Diagnostic Results         RADIOLOGY:  VL Extremity Venous Left             LABS:   Results for orders placed or performed during the hospital encounter of 06/17/19   CBC Auto Differential   Result Value Ref Range    WBC 8.5 4.0 - 11.0 K/uL    RBC 4.84 4.20 - 5.90 M/uL    Hemoglobin 13.2 (L) 13.5 - 17.5 g/dL    Hematocrit 40.6 40.5 - 52.5 %    MCV 83.9 80.0 - 100.0 fL    MCH 27.3 26.0 - 34.0 pg    MCHC 32.6 31.0 - 36.0 g/dL    RDW 16.7 (H) 12.4 - 15.4 %    Platelets 211 260 - 807 K/uL    MPV 7.2 5.0 - 10.5 fL    Neutrophils % 78.0 %    Lymphocytes % 17.0 %    Monocytes % 4.0 %    Eosinophils % 1.0 %    Basophils % 0.0 %    Neutrophils # 6.6 1.7 - 7.7 K/uL    Lymphocytes # 1.4 1.0 - 5.1 K/uL    Monocytes # 0.3 0.0 - 1.3 K/uL    Eosinophils # 0.1 0.0 - 0.6 K/uL    Basophils # 0.0 0.0 - 0.2 K/uL   Protime-INR   Result Value Ref Range    Protime 11.2 9.8 - 13.0 sec    INR 0.98 0.86 - 1.14   APTT   Result Value Ref Range    aPTT 34.2 26.0 - 36.0 sec   Basic Metabolic Panel   Result Value Ref Range    Sodium 139 136 - 145 mmol/L    Potassium 4.1 3.5 - 5.1 mmol/L    Chloride 102 99 - 110 mmol/L    CO2 28 21 - 32 mmol/L    Anion Gap 9 3 - 16    Glucose 120 (H) 70 - 99 mg/dL    BUN 20 7 - 20 mg/dL    CREATININE 1.0 0.9 - 1.3 mg/dL    GFR Non-African American >60 >60    GFR African American >60 >60    Calcium 9.6 8.3 - 10.6 mg/dL         RECENT VITALS:  BP: (!) 168/89, Temp: 98.2 °F (36.8 °C), Pulse: 92, Resp: 18, SpO2: 96 %       ED Course     Nursing Notes, Past Medical Hx, Past Surgical Hx, Social Hx, Allergies, and Family Hx were reviewed.     The patient was given the following medications:  No orders of the defined types were placed in this encounter. CONSULTS:  None    MEDICAL DECISION MAKING / ASSESSMENT / PLAN     Nena Mercado is a 38 y.o. male who presented to the emergency department with complaints of left lower extremity swelling. On assessment of the patient did have some mild left lower extreme swelling as compared to the right given his previous history of a provoked DVT and the fact that he is relatively sedentary I was concerned for the possibility of a DVT of left lower extremity Doppler was performed and showed no evidence of DVT or SVT. The patient had laboratory investigations which showed no evidence of any leukocytosis normal coagulation studies normal renal panel. He did have a glucose of 120. The patient otherwise had no physical exam findings or clinical presentation that would be consistent with a cellulitis. At this point time I feel the patient is safe for continued outpatient management with instructions to keep his legs elevated at night to try compression stockings and otherwise follow-up with his primary care provider for further evaluation as an outpatient. Clinical Impression     1.  Leg swelling        Disposition     PATIENT REFERRED TO:  Florencio Juares, [address] [address] [phone]            DISCHARGE MEDICATIONS:  New Prescriptions    No medications on file       DISPOSITION Decision To Discharge 06/17/2019 10:57:52 AM         Pelon Bourne MD  06/17/19 [id number]

## 2019-06-17 NOTE — ED NOTES
AVS reviewed with pt all questions and concerns addressed. Pt discharged home with all belongings.  Pt to follow up with family PC for further evaluation and workup      Dragan Cano RN  06/17/19 [id number]

## 2019-06-17 NOTE — ED TRIAGE NOTES
Pt comes to ed with swelling in his left foot and slightly reddened. Pt states has History of blood clots and is not on any blood thinners (Coumadin), Pt states was taken off by his . 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

## 2019-10-24 ENCOUNTER — TELEPHONE (OUTPATIENT)
Dept: FAMILY MEDICINE CLINIC | Age: 44
End: 2019-10-24

## 2019-11-18 ENCOUNTER — TELEPHONE (OUTPATIENT)
Dept: FAMILY MEDICINE CLINIC | Age: 44
End: 2019-11-18

## 2019-11-19 ENCOUNTER — OFFICE VISIT (OUTPATIENT)
Dept: FAMILY MEDICINE CLINIC | Age: 44
End: 2019-11-19
Payer: COMMERCIAL

## 2019-11-19 VITALS
WEIGHT: 315 LBS | DIASTOLIC BLOOD PRESSURE: 86 MMHG | SYSTOLIC BLOOD PRESSURE: 146 MMHG | HEIGHT: 68 IN | TEMPERATURE: 97.6 F | OXYGEN SATURATION: 97 % | RESPIRATION RATE: 16 BRPM | HEART RATE: 84 BPM | BODY MASS INDEX: 47.74 KG/M2

## 2019-11-19 DIAGNOSIS — R80.9 MICROALBUMINURIA: ICD-10-CM

## 2019-11-19 DIAGNOSIS — K43.9 VENTRAL HERNIA WITHOUT OBSTRUCTION OR GANGRENE: Primary | ICD-10-CM

## 2019-11-19 DIAGNOSIS — R73.03 PRE-DIABETES: ICD-10-CM

## 2019-11-19 DIAGNOSIS — I10 ESSENTIAL HYPERTENSION: Chronic | ICD-10-CM

## 2019-11-19 DIAGNOSIS — E66.01 MORBID OBESITY (HCC): ICD-10-CM

## 2019-11-19 DIAGNOSIS — E55.9 VITAMIN D INSUFFICIENCY: ICD-10-CM

## 2019-11-19 DIAGNOSIS — F41.8 DEPRESSION WITH ANXIETY: ICD-10-CM

## 2019-11-19 PROCEDURE — 99214 OFFICE O/P EST MOD 30 MIN: CPT | Performed by: FAMILY MEDICINE

## 2019-11-19 RX ORDER — BUPROPION HYDROCHLORIDE 150 MG/1
TABLET, EXTENDED RELEASE ORAL
Qty: 180 TABLET | Refills: 1 | Status: SHIPPED | OUTPATIENT
Start: 2019-11-19 | End: 2020-01-21

## 2019-11-19 RX ORDER — LOSARTAN POTASSIUM AND HYDROCHLOROTHIAZIDE 25; 100 MG/1; MG/1
TABLET ORAL
Qty: 90 TABLET | Refills: 1 | Status: SHIPPED | OUTPATIENT
Start: 2019-11-19 | End: 2020-08-17 | Stop reason: SDUPTHER

## 2019-11-19 RX ORDER — AMLODIPINE BESYLATE 2.5 MG/1
TABLET ORAL
Qty: 90 TABLET | Refills: 2 | Status: SHIPPED | OUTPATIENT
Start: 2019-11-19 | End: 2020-08-17 | Stop reason: SDUPTHER

## 2019-11-19 RX ORDER — MULTIVIT-MIN/IRON/FOLIC ACID/K 18-600-40
CAPSULE ORAL
Qty: 90 CAPSULE | Refills: 1 | Status: SHIPPED | OUTPATIENT
Start: 2019-11-19

## 2019-11-20 ASSESSMENT — ENCOUNTER SYMPTOMS
ANAL BLEEDING: 0
ABDOMINAL PAIN: 1
NAUSEA: 0
CHEST TIGHTNESS: 0
TROUBLE SWALLOWING: 0
ABDOMINAL DISTENTION: 0
SHORTNESS OF BREATH: 0
BELCHING: 0
BLOOD IN STOOL: 0
WHEEZING: 0
VOMITING: 0
VOICE CHANGE: 0
DIARRHEA: 0
COLOR CHANGE: 0
HEMATOCHEZIA: 0
BACK PAIN: 0
FLATUS: 0
CONSTIPATION: 0

## 2019-12-02 ENCOUNTER — TELEPHONE (OUTPATIENT)
Dept: FAMILY MEDICINE CLINIC | Age: 44
End: 2019-12-02

## 2020-01-21 RX ORDER — BUPROPION HYDROCHLORIDE 150 MG/1
TABLET, EXTENDED RELEASE ORAL
Qty: 180 TABLET | Refills: 0 | Status: SHIPPED | OUTPATIENT
Start: 2020-01-21 | End: 2020-08-17 | Stop reason: SDUPTHER

## 2020-03-30 ENCOUNTER — TELEPHONE (OUTPATIENT)
Dept: FAMILY MEDICINE CLINIC | Age: 45
End: 2020-03-30

## 2020-04-01 ENCOUNTER — TELEPHONE (OUTPATIENT)
Dept: FAMILY MEDICINE CLINIC | Age: 45
End: 2020-04-01

## 2020-04-01 NOTE — TELEPHONE ENCOUNTER
See message for Nafisa Tidwell. He should be out of work for one month     Monitor temperature if possible 2-3 x / day  Take tylenol prn only if sx like sore throat, fever  Need strict quarantine measures he can go to [employer] for patients education    Fax letter do not leave house   All her and his close contacts need to know they need to quarantine themselves too.

## 2020-04-01 NOTE — TELEPHONE ENCOUNTER
Patients wife has tested + with covid-19 and is at [facility] [employer] but patients work would like to know what he should do and if he should be off work longer.   Please advise  Kendra Alarcon

## 2020-04-07 ENCOUNTER — TELEPHONE (OUTPATIENT)
Dept: FAMILY MEDICINE CLINIC | Age: 45
End: 2020-04-07

## 2020-04-07 NOTE — TELEPHONE ENCOUNTER
Patients work is calling him requesting a letter with a date to return to work. Patients wife did test + for covid-19 and is at home with patient. On 4/1 we gave patient a letter to be off 4 weeks but they need a exact date.   Please advise  [place] [facility] [phone]

## 2020-04-20 RX ORDER — LOSARTAN POTASSIUM 100 MG/1
TABLET ORAL
Qty: 90 TABLET | Refills: 1 | Status: SHIPPED | OUTPATIENT
Start: 2020-04-20 | End: 2021-03-11 | Stop reason: CLARIF

## 2020-05-07 ENCOUNTER — TELEMEDICINE (OUTPATIENT)
Dept: FAMILY MEDICINE CLINIC | Age: 45
End: 2020-05-07
Payer: COMMERCIAL

## 2020-05-07 PROCEDURE — 99213 OFFICE O/P EST LOW 20 MIN: CPT | Performed by: FAMILY MEDICINE

## 2020-05-07 RX ORDER — MUPIROCIN CALCIUM 20 MG/G
CREAM TOPICAL
Qty: 30 G | Refills: 0 | Status: SHIPPED | OUTPATIENT
Start: 2020-05-07 | End: 2020-06-06

## 2020-05-07 RX ORDER — CHLORHEXIDINE GLUCONATE 4 G/100ML
SOLUTION TOPICAL
Qty: 200 ML | Refills: 0 | Status: SHIPPED | OUTPATIENT
Start: 2020-05-07 | End: 2020-05-15 | Stop reason: SDUPTHER

## 2020-05-07 ASSESSMENT — ENCOUNTER SYMPTOMS
VOMITING: 0
SHORTNESS OF BREATH: 0
DIARRHEA: 0
COUGH: 0
RHINORRHEA: 0
SORE THROAT: 0

## 2020-05-15 ENCOUNTER — TELEPHONE (OUTPATIENT)
Dept: FAMILY MEDICINE CLINIC | Age: 45
End: 2020-05-15

## 2020-05-15 RX ORDER — CHLORHEXIDINE GLUCONATE 4 G/100ML
SOLUTION TOPICAL
Qty: 200 ML | Refills: 0 | Status: SHIPPED | OUTPATIENT
Start: 2020-05-15 | End: 2020-05-29

## 2020-05-15 RX ORDER — CEPHALEXIN 250 MG/1
250 CAPSULE ORAL 3 TIMES DAILY
Qty: 30 CAPSULE | Refills: 0 | Status: SHIPPED | OUTPATIENT
Start: 2020-05-15 | End: 2021-03-11 | Stop reason: CLARIF

## 2020-05-15 NOTE — TELEPHONE ENCOUNTER
Pt states that he was prescribed a cream that was given to him last week during a virtual visit for a rash. Pt states that he used all the cream, but they rash has not gone away completely yet. Pt requests a refill. Med pending.     Last seen 5/7/20

## 2020-05-18 RX ORDER — BUPROPION HYDROCHLORIDE 150 MG/1
TABLET, EXTENDED RELEASE ORAL
Qty: 180 TABLET | Refills: 1 | Status: SHIPPED | OUTPATIENT
Start: 2020-05-18 | End: 2021-03-29 | Stop reason: SDUPTHER

## 2020-08-17 RX ORDER — AMLODIPINE BESYLATE 2.5 MG/1
TABLET ORAL
Qty: 90 TABLET | Refills: 2 | Status: SHIPPED | OUTPATIENT
Start: 2020-08-17 | End: 2021-02-22 | Stop reason: SDUPTHER

## 2020-08-17 RX ORDER — LOSARTAN POTASSIUM AND HYDROCHLOROTHIAZIDE 25; 100 MG/1; MG/1
TABLET ORAL
Qty: 90 TABLET | Refills: 1 | Status: SHIPPED | OUTPATIENT
Start: 2020-08-17 | End: 2021-02-22 | Stop reason: SDUPTHER

## 2020-08-17 RX ORDER — BUPROPION HYDROCHLORIDE 150 MG/1
TABLET, EXTENDED RELEASE ORAL
Qty: 180 TABLET | Refills: 0 | Status: SHIPPED | OUTPATIENT
Start: 2020-08-17 | End: 2020-11-10

## 2020-10-26 ENCOUNTER — NURSE TRIAGE (OUTPATIENT)
Dept: OTHER | Facility: CLINIC | Age: 45
End: 2020-10-26

## 2020-10-26 ENCOUNTER — TELEPHONE (OUTPATIENT)
Dept: FAMILY MEDICINE CLINIC | Age: 45
End: 2020-10-26

## 2020-10-26 NOTE — TELEPHONE ENCOUNTER
Patient wanted to be scheduled for an appt for gout on R foot. Please advise.      [phone] (M)  OV: 5/7/2020

## 2020-10-26 NOTE — TELEPHONE ENCOUNTER
Reason for Disposition   SEVERE pain (e.g., excruciating, unable to do any normal activities)    Answer Assessment - Initial Assessment Questions  1. ONSET: \"When did the pain start? \"       Today   2. LOCATION: \"Where is the pain located? \"       Right big toe   3. PAIN: \"How bad is the pain? \"    (Scale 1-10; or mild, moderate, severe)    -  MILD (1-3): doesn't interfere with normal activities     -  MODERATE (4-7): interferes with normal activities (e.g., work or school) or awakens from sleep, limping     -  SEVERE (8-10): excruciating pain, unable to do any normal activities, unable to walk      710  4. WORK OR EXERCISE: \"Has there been any recent work or exercise that involved this part of the body? \"       no  5. CAUSE: \"What do you think is causing the foot pain? \"      Gout   6. OTHER SYMPTOMS: \"Do you have any other symptoms? \" (e.g., leg pain, rash, fever, numbness)      no  7. PREGNANCY: \"Is there any chance you are pregnant? \" \"When was your last menstrual period? \"      no    Protocols used: FOOT PAIN-ADULT-[place]    Patient called pre-service center [facility] with red flag complaint. Brief description of triage:  Pt right toe has 7/10 pain - previously diagnosed with gout in [facility] and pain dissipated but returned this morning. Triage indicates for patient to be seen today in office    Care advice provided, patient verbalizes understanding; denies any other questions or concerns; instructed to call back for any new or worsening symptoms. Writer provided warm transfer to Angela Luque at [facility] for appointment scheduling. Attention Provider: Thank you for allowing me to participate in the care of your patient. The patient was connected to triage in response to information provided to the [facility]. Please do not respond through this encounter as the response is not directed to a shared pool.

## 2020-10-27 ENCOUNTER — TELEMEDICINE (OUTPATIENT)
Dept: FAMILY MEDICINE CLINIC | Age: 45
End: 2020-10-27
Payer: COMMERCIAL

## 2020-10-27 PROCEDURE — 99213 OFFICE O/P EST LOW 20 MIN: CPT | Performed by: FAMILY MEDICINE

## 2020-10-27 RX ORDER — PREDNISONE 20 MG/1
40 TABLET ORAL DAILY
Qty: 10 TABLET | Refills: 0 | Status: SHIPPED | OUTPATIENT
Start: 2020-10-27 | End: 2021-03-03 | Stop reason: SDUPTHER

## 2020-10-27 ASSESSMENT — ENCOUNTER SYMPTOMS: COLOR CHANGE: 1

## 2020-10-27 NOTE — PROGRESS NOTES
Subjective:      Patient ID: Jennifer Banks is a 41 y.o. male. Jennifer Banks is a 41 y.o. male being evaluated by a Virtual Visit (video visit) encounter to address concerns as mentioned above. A caregiver was present when appropriate. Due to this being a TeleHealth encounter (During [facility] public health emergency), evaluation of the following organ systems was limited: Vitals/Constitutional/EENT/Resp/CV/GI//MS/Neuro/Skin/Heme-Lymph-Imm. Pursuant to the emergency declaration under the [address] [place] [address], [address] [place] [address] authority and the [employer] Act, this Virtual Visit was conducted with patient's (and/or legal guardian's) consent, to reduce the patient's risk of exposure to COVID-19 and provide necessary medical care. The patient (and/or legal guardian) has also been advised to contact this office for worsening conditions or problems, and seek emergency medical treatment and/or call 911 if deemed necessary. Patient identification was verified at the start of the visit: Yes    Total time spent for this encounter: Not billed by time    Services were provided through a video synchronous discussion virtually to substitute for in-person clinic visit. Patient and provider were located at their individual homes. --Brown Gamez MD on 10/27/2020 at 11:20 AM    An electronic signature was used to authenticate this note. Toe Pain    There was no injury mechanism. Pain location: R big toe. The quality of the pain is described as aching. The pain is severe. The pain has been worsening since onset. Pertinent negatives include no inability to bear weight, loss of motion, loss of sensation, muscle weakness, numbness or tingling. He reports no foreign bodies present. The symptoms are aggravated by movement and palpation. He has tried acetaminophen for the symptoms. The treatment provided no relief.    PMH: positive for gout      Review of Systems   Constitutional: Positive for activity change. Negative for fatigue and fever. Musculoskeletal: Positive for gait problem. Skin: Positive for color change. Neurological: Negative for tingling, weakness and numbness. Objective:  No vs reported    Physical Exam  Vitals signs reviewed. Constitutional:       General: He is not in acute distress. Appearance: Normal appearance. He is obese. He is not ill-appearing, toxic-appearing or diaphoretic. HENT:      Head: Normocephalic and atraumatic. Ears:      Comments: Hearing intact to nml conversation     Neck:      Musculoskeletal: Normal range of motion. Pulmonary:      Effort: No respiratory distress. Musculoskeletal:        Feet:    Neurological:      General: No focal deficit present. Mental Status: He is alert and oriented to person, place, and time. Mental status is at baseline. Psychiatric:         Mood and Affect: Mood normal.         Behavior: Behavior normal.         Assessment:       Diagnosis Orders   1. Pain of toe of left foot  SEDIMENTATION RATE    URIC ACID   2. Essential hypertension  Comprehensive Metabolic Panel   3. Pre-diabetes  Hemoglobin A1C   4. Microalbuminuria  Microalbumin / Creatinine Urine Ratio   5. Pain of toe of right foot     6. Hyperlipidemia, unspecified hyperlipidemia type  Lipid Panel           Plan:      1. NSAID contraindicated sec to PUD   Trial with po steroid  Check labs  Patient to call if symptoms persist or worsen. Needs fu labs   Fu with results.     Ok LV for flu vaccine            Jace Kelly MD

## 2020-10-28 ENCOUNTER — NURSE ONLY (OUTPATIENT)
Dept: FAMILY MEDICINE CLINIC | Age: 45
End: 2020-10-28
Payer: COMMERCIAL

## 2020-10-28 LAB
A/G RATIO: 1.6 (ref 1.1–2.2)
ALBUMIN SERPL-MCNC: 3.9 G/DL (ref 3.4–5)
ALP BLD-CCNC: 114 U/L (ref 40–129)
ALT SERPL-CCNC: 36 U/L (ref 10–40)
ANION GAP SERPL CALCULATED.3IONS-SCNC: 14 MMOL/L (ref 3–16)
AST SERPL-CCNC: 13 U/L (ref 15–37)
BILIRUB SERPL-MCNC: <0.2 MG/DL (ref 0–1)
BUN BLDV-MCNC: 19 MG/DL (ref 7–20)
CALCIUM SERPL-MCNC: 9.3 MG/DL (ref 8.3–10.6)
CHLORIDE BLD-SCNC: 101 MMOL/L (ref 99–110)
CHOLESTEROL, TOTAL: 161 MG/DL (ref 0–199)
CO2: 26 MMOL/L (ref 21–32)
CREAT SERPL-MCNC: 1 MG/DL (ref 0.9–1.3)
ESTIMATED AVERAGE GLUCOSE: 142.7 MG/DL
GFR AFRICAN AMERICAN: >60
GFR NON-AFRICAN AMERICAN: >60
GLOBULIN: 2.4 G/DL
GLUCOSE BLD-MCNC: 141 MG/DL (ref 70–99)
HBA1C MFR BLD: 6.6 %
HDLC SERPL-MCNC: 50 MG/DL (ref 40–60)
LDL CHOLESTEROL CALCULATED: 87 MG/DL
POTASSIUM SERPL-SCNC: 3.6 MMOL/L (ref 3.5–5.1)
SODIUM BLD-SCNC: 141 MMOL/L (ref 136–145)
TOTAL PROTEIN: 6.3 G/DL (ref 6.4–8.2)
TRIGL SERPL-MCNC: 120 MG/DL (ref 0–150)
URIC ACID, SERUM: 7.1 MG/DL (ref 3.5–7.2)
VLDLC SERPL CALC-MCNC: 24 MG/DL

## 2020-10-28 PROCEDURE — 90686 IIV4 VACC NO PRSV 0.5 ML IM: CPT | Performed by: FAMILY MEDICINE

## 2020-10-28 PROCEDURE — 90471 IMMUNIZATION ADMIN: CPT | Performed by: FAMILY MEDICINE

## 2020-10-29 LAB — SEDIMENTATION RATE, ERYTHROCYTE: 36 MM/HR (ref 0–15)

## 2020-12-02 ENCOUNTER — TELEPHONE (OUTPATIENT)
Dept: FAMILY MEDICINE CLINIC | Age: 45
End: 2020-12-02

## 2020-12-02 NOTE — TELEPHONE ENCOUNTER
Pt has a cut on his abdominal scar (from abdominal surgery in 2015)  His wife said it looks infected.   He wants a VV ASAP    Please advise

## 2021-01-31 DIAGNOSIS — F41.8 DEPRESSION WITH ANXIETY: ICD-10-CM

## 2021-02-01 RX ORDER — BUPROPION HYDROCHLORIDE 150 MG/1
TABLET, EXTENDED RELEASE ORAL
Qty: 180 TABLET | Refills: 3 | OUTPATIENT
Start: 2021-02-01

## 2021-02-14 DIAGNOSIS — I10 ESSENTIAL HYPERTENSION: Chronic | ICD-10-CM

## 2021-02-15 RX ORDER — LOSARTAN POTASSIUM AND HYDROCHLOROTHIAZIDE 25; 100 MG/1; MG/1
TABLET ORAL
Qty: 90 TABLET | Refills: 3 | OUTPATIENT
Start: 2021-02-15

## 2021-02-22 ENCOUNTER — TELEMEDICINE (OUTPATIENT)
Dept: FAMILY MEDICINE CLINIC | Age: 46
End: 2021-02-22
Payer: COMMERCIAL

## 2021-02-22 DIAGNOSIS — R73.03 PRE-DIABETES: ICD-10-CM

## 2021-02-22 DIAGNOSIS — M25.571 ARTHRALGIA OF RIGHT FOOT: Primary | ICD-10-CM

## 2021-02-22 DIAGNOSIS — F41.8 DEPRESSION WITH ANXIETY: ICD-10-CM

## 2021-02-22 DIAGNOSIS — I10 ESSENTIAL HYPERTENSION: Chronic | ICD-10-CM

## 2021-02-22 PROCEDURE — 99213 OFFICE O/P EST LOW 20 MIN: CPT | Performed by: FAMILY MEDICINE

## 2021-02-22 RX ORDER — BUPROPION HYDROCHLORIDE 150 MG/1
TABLET, EXTENDED RELEASE ORAL
Qty: 180 TABLET | Refills: 0 | Status: SHIPPED | OUTPATIENT
Start: 2021-02-22 | End: 2021-03-22 | Stop reason: ALTCHOICE

## 2021-02-22 RX ORDER — LOSARTAN POTASSIUM AND HYDROCHLOROTHIAZIDE 25; 100 MG/1; MG/1
TABLET ORAL
Qty: 90 TABLET | Refills: 1 | Status: SHIPPED | OUTPATIENT
Start: 2021-02-22 | End: 2021-05-21 | Stop reason: SDUPTHER

## 2021-02-22 RX ORDER — METHYLPREDNISOLONE 4 MG/1
4 TABLET ORAL SEE ADMIN INSTRUCTIONS
Qty: 1 KIT | Refills: 0 | Status: SHIPPED | OUTPATIENT
Start: 2021-02-22 | End: 2021-03-01

## 2021-02-22 RX ORDER — AMLODIPINE BESYLATE 2.5 MG/1
TABLET ORAL
Qty: 90 TABLET | Refills: 2 | Status: SHIPPED | OUTPATIENT
Start: 2021-02-22 | End: 2021-03-22 | Stop reason: ALTCHOICE

## 2021-02-22 ASSESSMENT — ENCOUNTER SYMPTOMS: COLOR CHANGE: 0

## 2021-02-22 NOTE — PROGRESS NOTES
Subjective:      Shira Walker is a 41 y.o. male being evaluated by a Virtual Visit (video visit) encounter to address concerns as mentioned above. A caregiver was present when appropriate. Due to this being a TeleHealth encounter (During [facility] public health emergency), evaluation of the following organ systems was limited: Vitals/Constitutional/EENT/Resp/CV/GI//MS/Neuro/Skin/Heme-Lymph-Imm. Pursuant to the emergency declaration under the [address] [place] [address] and the [employer] Act, this Virtual Visit was conducted with patient's (and/or legal guardian's) consent, to reduce the patient's risk of exposure to COVID-19 and provide necessary medical care. The patient (and/or legal guardian) has also been advised to contact this office for worsening conditions or problems, and seek emergency medical treatment and/or call 911 if deemed necessary. Patient identification was verified at the start of the visit: Yes    Total time spent for this encounter: Not billed by time    Services were provided through a video synchronous discussion virtually to substitute for in-person clinic visit. Patient and provider were located at their individual homes. --Garcia Jacobson MD on 2/22/2021 at 3:17 PM    An electronic signature was used to authenticate this note. Patient ID: Shira Walker is a 41 y.o. male.     Foot Pain Pain location: foot R side. This is a recurrent problem. The current episode started 1 to 4 weeks ago. There has been no history of extremity trauma. The problem occurs intermittently. The problem has been unchanged. The quality of the pain is described as aching. The pain is moderate. Pertinent negatives include no fever, inability to bear weight, joint locking, joint swelling, limited range of motion, numbness, stiffness or tingling. The symptoms are aggravated by activity (palpation). He has tried acetaminophen for the symptoms. The treatment provided no relief. Needs refills      Review of Systems   Constitutional: Negative for activity change, appetite change and fever. Cardiovascular: Negative for leg swelling. Musculoskeletal: Positive for arthralgias and gait problem. Negative for myalgias and stiffness. Skin: Negative for color change. Neurological: Negative for tingling and numbness. Psychiatric/Behavioral: Positive for sleep disturbance. Objective:  No VS reported     Physical Exam  Constitutional:       General: He is not in acute distress. Appearance: Normal appearance. He is not ill-appearing, toxic-appearing or diaphoretic. HENT:      Head: Normocephalic and atraumatic. Neck:      Musculoskeletal: Normal range of motion. Pulmonary:      Effort: No respiratory distress. Musculoskeletal:        Feet:    Neurological:      General: No focal deficit present. Mental Status: He is alert and oriented to person, place, and time. Mental status is at baseline. Psychiatric:         Mood and Affect: Mood normal.         Behavior: Behavior normal.         Assessment:       Diagnosis Orders   1. Arthralgia of right foot  methylPREDNISolone (MEDROL, BELKIS,) 4 MG tablet   2. Depression with anxiety  buPROPion (WELLBUTRIN SR) 150 MG extended release tablet    sertraline (ZOLOFT) 50 MG tablet   3.  Essential hypertension  amLODIPine (NORVASC) 2.5 MG tablet losartan-hydroCHLOROthiazide (HYZAAR) 100-25 MG per tablet   4. Pre-diabetes  metFORMIN (GLUCOPHAGE) 500 MG tablet           Plan:      1. Medrol ziyad (NSAID contraindicated sec to hx of recurrent PUD)  Tylenol prn   Fu 2 weeks.      Refilled med  Fu in 2 weeks for fu           Sharon Lindo MD

## 2021-02-23 ENCOUNTER — TELEPHONE (OUTPATIENT)
Dept: FAMILY MEDICINE CLINIC | Age: 46
End: 2021-02-23

## 2021-02-23 NOTE — TELEPHONE ENCOUNTER
LM for pt to call office to schedule a CPE in 2 wks per Dr Christina Ramos and schedule LV for labs and urine

## 2021-02-23 NOTE — TELEPHONE ENCOUNTER
methylPREDNISolone (MEDROL, BELKIS,) 4 MG tablet    Patient states pharmacy never received medication. [address] [facility], [address] [facility] [phone]   [address], [address] [place] [address]   Phone:  [phone]  Fax:  [phone]    Please advise.

## 2021-02-23 NOTE — TELEPHONE ENCOUNTER
----- Message from Tran Novoa sent at 2/22/2021  5:17 PM EST -----  Subject: Referral Request    QUESTIONS   Reason for referral request? Pt has an appt with Dr. Tate Abrams and she told   the pt he needed blood work labs and a urine test as well. Has the physician seen you for this condition before? No   Preferred Specialist (if applicable)? Do you already have an appointment scheduled? No  Additional Information for Provider?   ---------------------------------------------------------------------------  --------------  CALL BACK INFO  What is the best way for the office to contact you? OK to leave message on   voicemail  Preferred Call Back Phone Number?  [phone]

## 2021-03-02 DIAGNOSIS — R73.03 PRE-DIABETES: ICD-10-CM

## 2021-03-02 DIAGNOSIS — M79.676 PAIN OF GREAT TOE, UNSPECIFIED LATERALITY: ICD-10-CM

## 2021-03-02 DIAGNOSIS — R73.03 PREDIABETES: ICD-10-CM

## 2021-03-02 DIAGNOSIS — I10 ESSENTIAL HYPERTENSION: Primary | Chronic | ICD-10-CM

## 2021-03-02 DIAGNOSIS — E78.5 HYPERLIPIDEMIA, UNSPECIFIED HYPERLIPIDEMIA TYPE: Chronic | ICD-10-CM

## 2021-03-02 DIAGNOSIS — I10 ESSENTIAL HYPERTENSION: Chronic | ICD-10-CM

## 2021-03-02 LAB
A/G RATIO: 1.5 (ref 1.1–2.2)
ALBUMIN SERPL-MCNC: 4 G/DL (ref 3.4–5)
ALP BLD-CCNC: 107 U/L (ref 40–129)
ALT SERPL-CCNC: 42 U/L (ref 10–40)
ANION GAP SERPL CALCULATED.3IONS-SCNC: 11 MMOL/L (ref 3–16)
AST SERPL-CCNC: 19 U/L (ref 15–37)
BILIRUB SERPL-MCNC: <0.2 MG/DL (ref 0–1)
BUN BLDV-MCNC: 25 MG/DL (ref 7–20)
CALCIUM SERPL-MCNC: 10.3 MG/DL (ref 8.3–10.6)
CHLORIDE BLD-SCNC: 99 MMOL/L (ref 99–110)
CHOLESTEROL, TOTAL: 160 MG/DL (ref 0–199)
CO2: 30 MMOL/L (ref 21–32)
CREAT SERPL-MCNC: 1.2 MG/DL (ref 0.9–1.3)
CREATININE URINE: 153.3 MG/DL (ref 39–259)
GFR AFRICAN AMERICAN: >60
GFR NON-AFRICAN AMERICAN: >60
GLOBULIN: 2.6 G/DL
GLUCOSE BLD-MCNC: 96 MG/DL (ref 70–99)
HDLC SERPL-MCNC: 48 MG/DL (ref 40–60)
LDL CHOLESTEROL CALCULATED: 81 MG/DL
MICROALBUMIN UR-MCNC: 41.9 MG/DL
MICROALBUMIN/CREAT UR-RTO: 273.3 MG/G (ref 0–30)
POTASSIUM SERPL-SCNC: 3.8 MMOL/L (ref 3.5–5.1)
SODIUM BLD-SCNC: 140 MMOL/L (ref 136–145)
TOTAL PROTEIN: 6.6 G/DL (ref 6.4–8.2)
TRIGL SERPL-MCNC: 155 MG/DL (ref 0–150)
URIC ACID, SERUM: 8.3 MG/DL (ref 3.5–7.2)
VLDLC SERPL CALC-MCNC: 31 MG/DL

## 2021-03-02 PROCEDURE — 36415 COLL VENOUS BLD VENIPUNCTURE: CPT | Performed by: FAMILY MEDICINE

## 2021-03-03 LAB
ESTIMATED AVERAGE GLUCOSE: 134.1 MG/DL
HBA1C MFR BLD: 6.3 %

## 2021-03-03 RX ORDER — PREDNISONE 20 MG/1
40 TABLET ORAL DAILY
Qty: 10 TABLET | Refills: 0 | Status: SHIPPED | OUTPATIENT
Start: 2021-03-03 | End: 2021-03-08

## 2021-03-11 ENCOUNTER — OFFICE VISIT (OUTPATIENT)
Dept: FAMILY MEDICINE CLINIC | Age: 46
End: 2021-03-11
Payer: COMMERCIAL

## 2021-03-11 VITALS
RESPIRATION RATE: 16 BRPM | TEMPERATURE: 97.7 F | BODY MASS INDEX: 49.44 KG/M2 | DIASTOLIC BLOOD PRESSURE: 90 MMHG | WEIGHT: 315 LBS | HEART RATE: 99 BPM | HEIGHT: 67 IN | OXYGEN SATURATION: 98 % | SYSTOLIC BLOOD PRESSURE: 148 MMHG

## 2021-03-11 DIAGNOSIS — M1A.9XX0 CHRONIC GOUT INVOLVING TOE WITHOUT TOPHUS, UNSPECIFIED CAUSE, UNSPECIFIED LATERALITY: ICD-10-CM

## 2021-03-11 DIAGNOSIS — I10 BENIGN ESSENTIAL HTN: ICD-10-CM

## 2021-03-11 DIAGNOSIS — E66.01 MORBID OBESITY WITH BMI OF 60.0-69.9, ADULT (HCC): ICD-10-CM

## 2021-03-11 DIAGNOSIS — Z00.00 WELL ADULT EXAM: Primary | ICD-10-CM

## 2021-03-11 DIAGNOSIS — K43.9 VENTRAL HERNIA WITHOUT OBSTRUCTION OR GANGRENE: ICD-10-CM

## 2021-03-11 PROCEDURE — 99396 PREV VISIT EST AGE 40-64: CPT | Performed by: FAMILY MEDICINE

## 2021-03-11 PROCEDURE — 99213 OFFICE O/P EST LOW 20 MIN: CPT | Performed by: FAMILY MEDICINE

## 2021-03-11 RX ORDER — AMLODIPINE BESYLATE 5 MG/1
5 TABLET ORAL DAILY
Qty: 90 TABLET | Refills: 1 | Status: SHIPPED | OUTPATIENT
Start: 2021-03-11 | End: 2021-05-21 | Stop reason: SDUPTHER

## 2021-03-11 RX ORDER — ALLOPURINOL 100 MG/1
100 TABLET ORAL DAILY
Qty: 90 TABLET | Refills: 1 | Status: SHIPPED | OUTPATIENT
Start: 2021-03-11 | End: 2021-05-21 | Stop reason: SDUPTHER

## 2021-03-11 ASSESSMENT — PATIENT HEALTH QUESTIONNAIRE - PHQ9
2. FEELING DOWN, DEPRESSED OR HOPELESS: 0
SUM OF ALL RESPONSES TO PHQ QUESTIONS 1-9: 0
SUM OF ALL RESPONSES TO PHQ9 QUESTIONS 1 & 2: 0

## 2021-03-11 ASSESSMENT — ENCOUNTER SYMPTOMS
CHEST TIGHTNESS: 0
VOMITING: 0
EYE REDNESS: 0
TROUBLE SWALLOWING: 0
WHEEZING: 0
SHORTNESS OF BREATH: 0
RHINORRHEA: 0
ABDOMINAL PAIN: 0
EYE ITCHING: 0
NAUSEA: 0

## 2021-03-11 NOTE — PROGRESS NOTES
Subjective:      Patient ID: Aye Craig is a 41 y.o. male. HPI    Well Adult Physical   Patient here for a comprehensive physical exam.The patient reports problems -   1. Would like referral to wt loss center   2. Gout his sx are improved but occasional pain and edema  3. Hypertension    bp at home not checked  , denies  ha, visual changes, syncope, presyncope, cp, sob, palpitations, edema, solis, orthopnea, pnd,  Compliant with medication, no secondary effects   4. Hernia: not painful       Do you take any herbs or supplements that were not prescribed by a doctor? no Are you taking calcium supplements? no Are you taking aspirin daily? not applicable   History:  Any STD's in the past? none  Tdap due in 2027    Review of Systems   Constitutional: Negative for activity change, appetite change, fatigue, fever and unexpected weight change. HENT: Negative for congestion, postnasal drip, rhinorrhea and trouble swallowing. Eyes: Negative for redness and itching. Respiratory: Negative for chest tightness, shortness of breath and wheezing. Cardiovascular: Negative for chest pain and palpitations. Gastrointestinal: Negative for abdominal pain, nausea and vomiting. Endocrine: Negative for polydipsia, polyphagia and polyuria. Genitourinary: Negative for discharge, dysuria, testicular pain and urgency. Musculoskeletal: Negative for arthralgias, gait problem, joint swelling, myalgias and neck pain. Skin: Negative for rash. Neurological: Negative for dizziness, light-headedness and headaches. Hematological: Negative for adenopathy. Does not bruise/bleed easily. Psychiatric/Behavioral: Negative for behavioral problems and sleep disturbance. The patient is not nervous/anxious. has No Known Allergies.       Current Outpatient Medications:     allopurinol (ZYLOPRIM) 100 MG tablet, Take 1 tablet by mouth daily, Disp: 90 tablet, Rfl: 1    amLODIPine (NORVASC) 5 MG tablet, Take 1 tablet by mouth daily, Disp: 90 tablet, Rfl: 1    buPROPion (WELLBUTRIN SR) 150 MG extended release tablet, TAKE 1 TABLET TWICE A DAY, Disp: 180 tablet, Rfl: 0    sertraline (ZOLOFT) 50 MG tablet, TAKE ONE TABLET BY MOUTH DAILY, Disp: 30 tablet, Rfl: 5    amLODIPine (NORVASC) 2.5 MG tablet, One po qd, Disp: 90 tablet, Rfl: 2    losartan-hydroCHLOROthiazide (HYZAAR) 100-25 MG per tablet, TAKE 1 TABLET BY MOUTH ONCE DAILY, Disp: 90 tablet, Rfl: 1    metFORMIN (GLUCOPHAGE) 500 MG tablet, TAKE ONE TABLET BY MOUTH ONCE DAILY WITH BREAKFAST, Disp: 90 tablet, Rfl: 1    Cholecalciferol (VITAMIN D) 50 MCG (2000 UT) CAPS capsule, One po qd, Disp: 90 capsule, Rfl: 1    Ascorbic Acid (VITAMIN C) 250 MG tablet, Take 250 mg by mouth daily, Disp: , Rfl:     acetaminophen (TYLENOL) 325 MG tablet, Take 1 tablet by mouth every 6 hours as needed for Pain, Disp: 60 tablet, Rfl: 0    Probiotic Product (PROBIOTIC DAILY PO), Take by mouth, Disp: , Rfl:     buPROPion (ZYBAN) 150 MG extended release tablet, TAKE ONE TABLET BY MOUTH TWICE DAILY (Patient not taking: Reported on 3/11/2021), Disp: 180 tablet, Rfl: 1     has a past medical history of Abdominal hernia, Alcohol abuse, Anxiety, Clostridium difficile diarrhea, Clostridium difficile diarrhea, DVT of axillary vein, acute left (HCC), Hernia, History of blood transfusion, Hyperlipemia, Hypertension, Kidney congenitally absent, left, Kidney disease, Lightheaded, Obesity, Obstructive sleep apnea (adult) (pediatric), Renal agenesis, and Severe single current episode of major depressive disorder, without psychotic features ([facility] [address] Past Surgical History:   Procedure Laterality Date    HERNIA REPAIR      2011    OTHER SURGICAL HISTORY  4/20/2015    EXCISIONAL DEBRIDEMENT AND IRRIGATION OF ABDOMINAL WOUND    TONSILLECTOMY AND ADENOIDECTOMY      VENTRAL HERNIA REPAIR  3/23/15    with mesh    VENTRAL HERNIA REPAIR  6/2/16        reports that he quit smoking about 3 years ago.  He has a 2.00 pack-year smoking history. He has never used smokeless tobacco. He reports that he does not drink alcohol or use drugs. family history includes Cancer in his father; Coronary Art Dis (age of onset: 36) in his mother; High Blood Pressure in his mother and sister; Other in his mother and sister; Stroke (age of onset: 27) in his mother. Objective:  Blood pressure (!) 148/90, pulse 99, temperature 97.7 °F (36.5 °C), temperature source Tympanic, resp. rate 16, height 5' 7\" (1.702 m), weight (!) 410 lb (186 kg), SpO2 98 %. Physical Exam  Vitals signs and nursing note reviewed. Constitutional:       General: He is not in acute distress. Appearance: Normal appearance. He is well-developed. He is obese. He is not ill-appearing, toxic-appearing or diaphoretic. HENT:      Head: Normocephalic. Right Ear: Tympanic membrane, ear canal and external ear normal. There is no impacted cerumen. Left Ear: Tympanic membrane, ear canal and external ear normal. There is no impacted cerumen. Ears:      Comments: Hearing intact to nml conversation        Nose: Nose normal. No congestion. Mouth/Throat:      Mouth: Mucous membranes are moist.      Pharynx: Oropharynx is clear. No oropharyngeal exudate or posterior oropharyngeal erythema. Eyes:      General: No scleral icterus. Right eye: No discharge. Left eye: No discharge. Extraocular Movements: Extraocular movements intact. Conjunctiva/sclera: Conjunctivae normal.      Pupils: Pupils are equal, round, and reactive to light. Neck:      Musculoskeletal: Normal range of motion and neck supple. No muscular tenderness. Thyroid: No thyromegaly. Vascular: No carotid bruit or JVD. Cardiovascular:      Rate and Rhythm: Normal rate and regular rhythm. Pulses: Normal pulses. Heart sounds: Normal heart sounds. No murmur. No friction rub. No gallop.     Pulmonary:      Effort: Pulmonary effort is normal. No respiratory distress. Breath sounds: Normal breath sounds. No stridor. No wheezing, rhonchi or rales. Chest:      Chest wall: No tenderness. Abdominal:      General: Abdomen is flat. Bowel sounds are normal. There is no distension. Palpations: Abdomen is soft. There is no mass. Tenderness: There is no abdominal tenderness. There is no guarding. Hernia: A hernia is present. Comments: abd hernia, not painful  Obese abdomen     Musculoskeletal: Normal range of motion. General: No swelling or tenderness. Right lower leg: No edema. Left lower leg: No edema. Lymphadenopathy:      Cervical: No cervical adenopathy. Skin:     General: Skin is warm. Capillary Refill: Capillary refill takes less than 2 seconds. Coloration: Skin is not pale. Findings: No erythema or rash. Neurological:      General: No focal deficit present. Mental Status: He is alert. Mental status is at baseline. He is disoriented. Cranial Nerves: No cranial nerve deficit. Sensory: No sensory deficit. Motor: No weakness or abnormal muscle tone. Coordination: Coordination normal.      Gait: Gait normal.      Deep Tendon Reflexes: Reflexes normal.   Psychiatric:         Mood and Affect: Mood normal.         Behavior: Behavior normal.         Thought Content: Thought content normal.         Judgment: Judgment normal.         Assessment:       Diagnosis Orders   1. Well adult exam     2. Chronic gout involving toe without tophus, unspecified cause, unspecified laterality  allopurinol (ZYLOPRIM) 100 MG tablet   3. Morbid obesity with BMI of 60.0-69.9, adult ([facility]  [facility]   4. Benign essential HTN  amLODIPine (NORVASC) 5 MG tablet   5. Ventral hernia without obstruction or gangrene             Plan:      Well adult:    . Doing well, encourage healthy diet, exercise, safety    . Screening labs orders given   .  Preventive: utd 2. Add allopurinol  encourage wt loss,     3. Referral     4. Not at goal, increase amlodipine to 5 mg   Encourage compliance with medication, life style changes    5.  Not painful  Monitor sx.   patient agrees and verbalizes understanding      Fu 3 mo for fu HTN                     Feli Kumar MD

## 2021-03-22 ENCOUNTER — OFFICE VISIT (OUTPATIENT)
Dept: BARIATRICS/WEIGHT MGMT | Age: 46
End: 2021-03-22

## 2021-03-22 VITALS
HEART RATE: 105 BPM | TEMPERATURE: 97.7 F | WEIGHT: 315 LBS | DIASTOLIC BLOOD PRESSURE: 87 MMHG | HEIGHT: 67 IN | BODY MASS INDEX: 49.44 KG/M2 | SYSTOLIC BLOOD PRESSURE: 148 MMHG

## 2021-03-22 DIAGNOSIS — I10 HYPERTENSION, ESSENTIAL: ICD-10-CM

## 2021-03-22 DIAGNOSIS — E66.01 MORBID OBESITY DUE TO EXCESS CALORIES (HCC): Primary | ICD-10-CM

## 2021-03-22 PROCEDURE — 99999 PR OFFICE/OUTPT VISIT,PROCEDURE ONLY: CPT

## 2021-03-22 RX ORDER — MAGNESIUM GLUCONATE 30 MG(550)
TABLET ORAL
COMMUNITY

## 2021-03-22 NOTE — PROGRESS NOTES
Sharona Strickland is a 41 y.o. male with a date of birth of 1975. Vitals:    03/22/21 0920   BP: (!) 157/86   Pulse: 105   Temp: 97.7 °F (36.5 °C)   Weight: (!) 406 lb (184.2 kg)   Height: 5' 6.5\" (1.689 m)    BMI: Body mass index is 64.55 kg/m². Obesity Classification: Class III    Weight History: Wt Readings from Last 3 Encounters:   03/22/21 (!) 406 lb (184.2 kg)   03/11/21 (!) 410 lb (186 kg)   11/19/19 (!) 384 lb 11.2 oz (174.5 kg)       Patient's lowest adult weight was 235 lb at age 46. Felt he gained weight after surgeries and recovering. Patient's highest adult weight was 410 lb, current. Patient has participated in the following weight loss programs: low carb diet. Patient has not participated in meal replacement/liquid diets. Patient has not participated in weight loss medications. Patient is not lactose intolerant. Patient does not have Voodoo/cultural food concerns. Patient does not have food allergies. 24 hour recall/food frequency chart:  Breakfast: no.   Snack: no.  Lunch: yes. Impossible whopper OR None  Snack: no.   Dinner: yes. Fried fish sandwich OR chicken burrito burrito/rice  Snack: yes. Grapes/banana  Drinks throughout the day: diet coke/diet pepsi and water (less than 8 cups)  Do you drink alcohol? no.     Patient does meet the criteria for binge eating disorder. Patient does have grazing. Patient does not have night eating. Patient does have a history of emotional eating or eating out of boredom. Pt working from home now and feels this plays a big role in bad habits and less movement.       Goals  Weight: 235#  Health Improvement: Improving movement and blood pressure, feel better overall    Assessment  Nutritional Needs: RMR=(9.99 x 184.2) + (6.25 x 168.9) - (4.92 x 45 y.o.) +5  = 2684 kcal x 1.4 (sedentary activity factor)= 3757 kcal - 1000 (for 2 lb weight loss/week)= 2757 kcal.    Plan  Plan/Recommendations: General weight loss/lifestyle modification

## 2021-03-30 DIAGNOSIS — R82.90 ABNORMAL URINE: Primary | ICD-10-CM

## 2021-03-30 RX ORDER — BUPROPION HYDROCHLORIDE 150 MG/1
TABLET, EXTENDED RELEASE ORAL
Qty: 180 TABLET | Refills: 1 | Status: SHIPPED | OUTPATIENT
Start: 2021-03-30 | End: 2021-05-21 | Stop reason: SDUPTHER

## 2021-04-02 DIAGNOSIS — F41.8 DEPRESSION WITH ANXIETY: ICD-10-CM

## 2021-04-26 ENCOUNTER — TELEPHONE (OUTPATIENT)
Dept: FAMILY MEDICINE CLINIC | Age: 46
End: 2021-04-26

## 2021-05-21 ENCOUNTER — OFFICE VISIT (OUTPATIENT)
Dept: FAMILY MEDICINE CLINIC | Age: 46
End: 2021-05-21
Payer: COMMERCIAL

## 2021-05-21 VITALS
SYSTOLIC BLOOD PRESSURE: 134 MMHG | DIASTOLIC BLOOD PRESSURE: 86 MMHG | OXYGEN SATURATION: 96 % | RESPIRATION RATE: 16 BRPM | BODY MASS INDEX: 49.44 KG/M2 | HEIGHT: 67 IN | WEIGHT: 315 LBS | HEART RATE: 95 BPM | TEMPERATURE: 98.2 F

## 2021-05-21 DIAGNOSIS — R80.9 MICROALBUMINURIA: ICD-10-CM

## 2021-05-21 DIAGNOSIS — F41.8 DEPRESSION WITH ANXIETY: ICD-10-CM

## 2021-05-21 DIAGNOSIS — M1A.9XX0 CHRONIC GOUT INVOLVING TOE WITHOUT TOPHUS, UNSPECIFIED CAUSE, UNSPECIFIED LATERALITY: ICD-10-CM

## 2021-05-21 DIAGNOSIS — Z11.59 NEED FOR HEPATITIS C SCREENING TEST: ICD-10-CM

## 2021-05-21 DIAGNOSIS — I10 ESSENTIAL HYPERTENSION: Chronic | ICD-10-CM

## 2021-05-21 DIAGNOSIS — E11.9 TYPE 2 DIABETES MELLITUS WITHOUT COMPLICATION, WITHOUT LONG-TERM CURRENT USE OF INSULIN (HCC): Primary | ICD-10-CM

## 2021-05-21 DIAGNOSIS — Z02.9 ADMINISTRATIVE ENCOUNTER: ICD-10-CM

## 2021-05-21 LAB
CREATININE URINE: 127.5 MG/DL (ref 39–259)
HEPATITIS C ANTIBODY INTERPRETATION: NORMAL
MICROALBUMIN UR-MCNC: 40.5 MG/DL
MICROALBUMIN/CREAT UR-RTO: 317.6 MG/G (ref 0–30)

## 2021-05-21 PROCEDURE — 99214 OFFICE O/P EST MOD 30 MIN: CPT | Performed by: FAMILY MEDICINE

## 2021-05-21 RX ORDER — BUPROPION HYDROCHLORIDE 150 MG/1
TABLET, EXTENDED RELEASE ORAL
Qty: 180 TABLET | Refills: 1 | Status: SHIPPED | OUTPATIENT
Start: 2021-05-21 | End: 2021-10-11

## 2021-05-21 RX ORDER — AMLODIPINE BESYLATE 5 MG/1
5 TABLET ORAL DAILY
Qty: 90 TABLET | Refills: 1 | Status: ON HOLD | OUTPATIENT
Start: 2021-05-21 | End: 2021-07-05 | Stop reason: HOSPADM

## 2021-05-21 RX ORDER — ALLOPURINOL 100 MG/1
100 TABLET ORAL DAILY
Qty: 90 TABLET | Refills: 1 | Status: SHIPPED | OUTPATIENT
Start: 2021-05-21 | End: 2021-11-29

## 2021-05-21 RX ORDER — LOSARTAN POTASSIUM AND HYDROCHLOROTHIAZIDE 25; 100 MG/1; MG/1
TABLET ORAL
Qty: 90 TABLET | Refills: 1 | Status: ON HOLD | OUTPATIENT
Start: 2021-05-21 | End: 2021-07-05 | Stop reason: HOSPADM

## 2021-05-21 ASSESSMENT — PATIENT HEALTH QUESTIONNAIRE - PHQ9
SUM OF ALL RESPONSES TO PHQ QUESTIONS 1-9: 0
SUM OF ALL RESPONSES TO PHQ9 QUESTIONS 1 & 2: 0
2. FEELING DOWN, DEPRESSED OR HOPELESS: 0

## 2021-05-21 NOTE — PROGRESS NOTES
Subjective:      Patient ID: Arun Magdaleno is a 41 y.o. male. HPI   Diagnosis Orders   1. Type 2 diabetes mellitus without complication, without long-term current use of insulin ([address]   Started tx with wt loss clinic,   Exercise: no regular  Compliant w metformin, no sec effects       2. Essential hypertension   Compliant with med   Needs refills  No cp/sob/edema          3. Depression with anxiety   denies crying spells, fatigue, anhedonia, insomnia, suicidal or homicidal ideas,                    4.. Chronic gout involving toe without tophus, unspecified cause, unspecified laterality   Sx improved  Still mild pain on toes,   No redness      5. Administrative encounter               Review of Systems  Above  has No Known Allergies.       Current Outpatient Medications:     sertraline (ZOLOFT) 50 MG tablet, TAKE ONE TABLET BY MOUTH DAILY, Disp: 90 tablet, Rfl: 5    metFORMIN (GLUCOPHAGE) 500 MG tablet, TAKE ONE TABLET BY MOUTH ONCE DAILY WITH BREAKFAST, Disp: 90 tablet, Rfl: 1    losartan-hydroCHLOROthiazide (HYZAAR) 100-25 MG per tablet, TAKE 1 TABLET BY MOUTH ONCE DAILY, Disp: 90 tablet, Rfl: 1    buPROPion (ZYBAN) 150 MG extended release tablet, TAKE ONE TABLET BY MOUTH TWICE DAILY, Disp: 180 tablet, Rfl: 1    allopurinol (ZYLOPRIM) 100 MG tablet, Take 1 tablet by mouth daily, Disp: 90 tablet, Rfl: 1    amLODIPine (NORVASC) 5 MG tablet, Take 1 tablet by mouth daily, Disp: 90 tablet, Rfl: 1    Potassium Gluconate 595 (99 K) MG TABS, Take by mouth, Disp: , Rfl:     Cholecalciferol (VITAMIN D) 50 MCG (2000 UT) CAPS capsule, One po qd, Disp: 90 capsule, Rfl: 1    Ascorbic Acid (VITAMIN C) 250 MG tablet, Take 250 mg by mouth daily, Disp: , Rfl:     Probiotic Product (PROBIOTIC DAILY PO), Take by mouth, Disp: , Rfl:      has a past medical history of Abdominal hernia, Alcohol abuse, Anxiety, Clostridium difficile diarrhea, Clostridium difficile diarrhea, DVT (deep venous thrombosis) (HCC), DVT of axillary vein, acute left ([address] Gout, H/O ulcer disease, Hernia, History of blood transfusion, Hyperlipemia, Hypertension, Hypertension, essential, Kidney congenitally absent, left, Kidney disease, Lightheaded, Obesity, Obstructive sleep apnea (adult) (pediatric), Renal agenesis, and Severe single current episode of major depressive disorder, without psychotic features ([address] Past Surgical History:   Procedure Laterality Date    HERNIA REPAIR      2011    OTHER SURGICAL HISTORY  4/20/2015    EXCISIONAL DEBRIDEMENT AND IRRIGATION OF ABDOMINAL WOUND    TONSILLECTOMY AND ADENOIDECTOMY      VENTRAL HERNIA REPAIR  3/23/15    with mesh    VENTRAL HERNIA REPAIR  6/2/16        reports that he quit smoking about 3 years ago. He has a 2.00 pack-year smoking history. He has never used smokeless tobacco. He reports that he does not drink alcohol and does not use drugs. family history includes Cancer in his father; Coronary Art Dis (age of onset: 36) in his mother; High Blood Pressure in his mother and sister; Hypertension in his mother and sister; Other in his mother and sister; Stroke (age of onset: 27) in his mother. Objective:  Blood pressure 134/86, pulse 95, temperature 98.2 °F (36.8 °C), temperature source Tympanic, resp. rate 16, height 5' 7\" (1.702 m), weight (!) 407 lb (184.6 kg), SpO2 96 %. Physical Exam  Vitals and nursing note reviewed. Constitutional:       General: He is not in acute distress. Appearance: Normal appearance. He is well-developed. He is obese. He is not ill-appearing, toxic-appearing or diaphoretic. HENT:      Head: Normocephalic. Mouth/Throat:      Pharynx: No oropharyngeal exudate. Eyes:      General: No scleral icterus. Conjunctiva/sclera: Conjunctivae normal.      Pupils: Pupils are equal, round, and reactive to light. Neck:      Comments: No carotid bruits    Cardiovascular:      Rate and Rhythm: Normal rate and regular rhythm.       Heart sounds: Normal heart sounds. No murmur heard. No friction rub. Comments: No edema    Pulmonary:      Effort: Pulmonary effort is normal. No respiratory distress. Breath sounds: Normal breath sounds. No wheezing or rales. Chest:      Chest wall: No tenderness. Abdominal:      General: There is no distension. Hernia: A hernia is present. Comments: + ventral hernia, no painful     Musculoskeletal:      Cervical back: Normal range of motion and neck supple. Right lower leg: No edema. Left lower leg: No edema. Lymphadenopathy:      Cervical: No cervical adenopathy. Skin:     General: Skin is warm. Findings: No erythema or rash. Neurological:      General: No focal deficit present. Mental Status: He is alert and oriented to person, place, and time. Cranial Nerves: No cranial nerve deficit. Coordination: Coordination normal.   Psychiatric:         Behavior: Behavior normal.         Assessment:       Diagnosis Orders   1. Type 2 diabetes mellitus without complication, without long-term current use of insulin ([facility] [address]     2. Essential hypertension  losartan-hydroCHLOROthiazide (HYZAAR) 100-25 MG per tablet    amLODIPine (NORVASC) 5 MG tablet   3. Depression with anxiety  sertraline (ZOLOFT) 50 MG tablet    buPROPion (ZYBAN) 150 MG extended release tablet   4. Microalbuminuria  Microalbumin / Creatinine Urine Ratio   5. Chronic gout involving toe without tophus, unspecified cause, unspecified laterality  allopurinol (ZYLOPRIM) 100 MG tablet   6. Administrative encounter     7. Need for hepatitis C screening test  Hepatitis C Antibody           Plan:      1. a1c is 6.3  Results discussed with him   Encourage compliance with medication, life style changes  Refills  Fu 6mo    2. At goal  Refills  Encourage compliance with medication, life style changes    3. Improved  Continue same medications no changes needed at this time     4. On previous labs  Repeat   Continue arb     5. Improving  Continue same medications no changes needed at this time     6. fmla filled.      7. Hep c screening  Recommended covid vaccine   patient agrees and verbalizes understanding          Kenyatta Huntley MD

## 2021-05-24 DIAGNOSIS — R80.9 MICROALBUMINURIA: Primary | ICD-10-CM

## 2021-05-25 ENCOUNTER — HOSPITAL ENCOUNTER (OUTPATIENT)
Dept: ULTRASOUND IMAGING | Age: 46
Discharge: HOME | End: 2021-05-25
Payer: COMMERCIAL

## 2021-05-25 DIAGNOSIS — R80.9 MICROALBUMINURIA: ICD-10-CM

## 2021-05-25 LAB
24HR URINE VOLUME (ML): 2300 ML
CREATININE 24 HOUR URINE: 2 G/24HR (ref 0.6–2.5)

## 2021-05-25 PROCEDURE — 76770 US EXAM ABDO BACK WALL COMP: CPT

## 2021-05-26 ENCOUNTER — TELEPHONE (OUTPATIENT)
Dept: FAMILY MEDICINE CLINIC | Age: 46
End: 2021-05-26

## 2021-05-26 NOTE — TELEPHONE ENCOUNTER
The 24 hour protein test came back normal and normal ultrasound (he was born with one kidney), we need to keep monitoring, keep his bp less 130/80     Do not take nsiads (ibu or naproxen  Increase fluids  And Continue same medications no changes needed at this time     Repeat in 3 months

## 2021-06-08 ENCOUNTER — OFFICE VISIT (OUTPATIENT)
Dept: FAMILY MEDICINE CLINIC | Age: 46
End: 2021-06-08
Payer: COMMERCIAL

## 2021-06-08 VITALS
DIASTOLIC BLOOD PRESSURE: 86 MMHG | SYSTOLIC BLOOD PRESSURE: 138 MMHG | TEMPERATURE: 98.6 F | HEIGHT: 67 IN | WEIGHT: 315 LBS | OXYGEN SATURATION: 96 % | BODY MASS INDEX: 49.44 KG/M2 | RESPIRATION RATE: 16 BRPM | HEART RATE: 96 BPM

## 2021-06-08 DIAGNOSIS — L02.213 ABSCESS OF CHEST WALL: Primary | ICD-10-CM

## 2021-06-08 PROCEDURE — 99213 OFFICE O/P EST LOW 20 MIN: CPT | Performed by: FAMILY MEDICINE

## 2021-06-08 RX ORDER — SULFAMETHOXAZOLE AND TRIMETHOPRIM 800; 160 MG/1; MG/1
1 TABLET ORAL 2 TIMES DAILY
Qty: 20 TABLET | Refills: 0 | Status: SHIPPED | OUTPATIENT
Start: 2021-06-08 | End: 2021-06-18

## 2021-06-08 ASSESSMENT — PATIENT HEALTH QUESTIONNAIRE - PHQ9
1. LITTLE INTEREST OR PLEASURE IN DOING THINGS: 0
SUM OF ALL RESPONSES TO PHQ QUESTIONS 1-9: 0
SUM OF ALL RESPONSES TO PHQ9 QUESTIONS 1 & 2: 0
SUM OF ALL RESPONSES TO PHQ QUESTIONS 1-9: 0
SUM OF ALL RESPONSES TO PHQ QUESTIONS 1-9: 0
2. FEELING DOWN, DEPRESSED OR HOPELESS: 0

## 2021-06-08 ASSESSMENT — ENCOUNTER SYMPTOMS
VOMITING: 0
NAUSEA: 0

## 2021-06-08 NOTE — PROGRESS NOTES
bSubjective:      Patient ID: Stephanie Lee is a 41 y.o. male. Other  This is a new (wound on L chest wall. noticed a draining wound on L chest wall) problem. The problem occurs constantly. The problem has been unchanged. Pertinent negatives include no chills, fatigue, fever, nausea, rash, vomiting or weakness. Nothing aggravates the symptoms. Treatments tried: neosporin. Objective:  Blood pressure 138/86, pulse 96, temperature 98.6 °F (37 °C), temperature source Tympanic, resp. rate 16, height 5' 7\" (1.702 m), weight (!) 404 lb (183.3 kg), SpO2 96 %. Physical Exam  Vitals and nursing note reviewed. Constitutional:       General: He is not in acute distress. Appearance: Normal appearance. He is obese. He is not ill-appearing, toxic-appearing or diaphoretic. HENT:      Head: Normocephalic and atraumatic. Pulmonary:      Effort: No respiratory distress. Chest:       Musculoskeletal:      Cervical back: Normal range of motion. Neurological:      General: No focal deficit present. Mental Status: He is alert and oriented to person, place, and time. Mental status is at baseline. Motor: No weakness. Psychiatric:         Mood and Affect: Mood normal.         Behavior: Behavior normal.         Thought Content: Thought content normal.         Assessment:       Diagnosis Orders   1. Abscess of chest wall  sulfamethoxazole-trimethoprim (BACTRIM DS) 800-160 MG per tablet           Plan:      NOS   tx antibiotic, stop neosporin  Wound care   Fu 1 week,   Patient to call if symptoms persist or worsen.            Vasu Velasco MD

## 2021-07-03 ENCOUNTER — APPOINTMENT (OUTPATIENT)
Dept: CT IMAGING | Age: 46
DRG: 247 | End: 2021-07-03
Payer: COMMERCIAL

## 2021-07-03 ENCOUNTER — HOSPITAL ENCOUNTER (INPATIENT)
Age: 46
LOS: 2 days | Discharge: HOME | DRG: 247 | End: 2021-07-05
Attending: EMERGENCY MEDICINE | Admitting: INTERNAL MEDICINE
Payer: COMMERCIAL

## 2021-07-03 ENCOUNTER — APPOINTMENT (OUTPATIENT)
Dept: GENERAL RADIOLOGY | Age: 46
DRG: 247 | End: 2021-07-03
Payer: COMMERCIAL

## 2021-07-03 DIAGNOSIS — I21.4 NSTEMI (NON-ST ELEVATED MYOCARDIAL INFARCTION) (HCC): Primary | ICD-10-CM

## 2021-07-03 LAB
A/G RATIO: 1.3 (ref 1.1–2.2)
ALBUMIN SERPL-MCNC: 3.9 G/DL (ref 3.4–5)
ALP BLD-CCNC: 115 U/L (ref 40–129)
ALT SERPL-CCNC: 44 U/L (ref 10–40)
ANION GAP SERPL CALCULATED.3IONS-SCNC: 12 MMOL/L (ref 3–16)
APTT: 37.1 SEC (ref 26.2–38.6)
AST SERPL-CCNC: 34 U/L (ref 15–37)
BASOPHILS ABSOLUTE: 0.1 K/UL (ref 0–0.2)
BASOPHILS RELATIVE PERCENT: 0.5 %
BILIRUB SERPL-MCNC: <0.2 MG/DL (ref 0–1)
BUN BLDV-MCNC: 17 MG/DL (ref 7–20)
CALCIUM SERPL-MCNC: 10 MG/DL (ref 8.3–10.6)
CHLORIDE BLD-SCNC: 98 MMOL/L (ref 99–110)
CO2: 28 MMOL/L (ref 21–32)
CREAT SERPL-MCNC: 1 MG/DL (ref 0.9–1.3)
EKG ATRIAL RATE: 99 BPM
EKG DIAGNOSIS: NORMAL
EKG P AXIS: 70 DEGREES
EKG P-R INTERVAL: 166 MS
EKG Q-T INTERVAL: 338 MS
EKG QRS DURATION: 88 MS
EKG QTC CALCULATION (BAZETT): 433 MS
EKG R AXIS: 22 DEGREES
EKG T AXIS: -8 DEGREES
EKG VENTRICULAR RATE: 99 BPM
EOSINOPHILS ABSOLUTE: 0.2 K/UL (ref 0–0.6)
EOSINOPHILS RELATIVE PERCENT: 2.3 %
GFR AFRICAN AMERICAN: >60
GFR NON-AFRICAN AMERICAN: >60
GLOBULIN: 3 G/DL
GLUCOSE BLD-MCNC: 107 MG/DL (ref 70–99)
GLUCOSE BLD-MCNC: 114 MG/DL (ref 70–99)
GLUCOSE BLD-MCNC: 145 MG/DL (ref 70–99)
GLUCOSE BLD-MCNC: 99 MG/DL (ref 70–99)
HCT VFR BLD CALC: 40.4 % (ref 40.5–52.5)
HEMOGLOBIN: 13.2 G/DL (ref 13.5–17.5)
INR BLD: 0.94 (ref 0.88–1.12)
LEFT VENTRICULAR EJECTION FRACTION MODE: NORMAL
LIPASE: 22 U/L (ref 13–60)
LV EF: 60 %
LYMPHOCYTES ABSOLUTE: 1.6 K/UL (ref 1–5.1)
LYMPHOCYTES RELATIVE PERCENT: 16.3 %
MCH RBC QN AUTO: 26.8 PG (ref 26–34)
MCHC RBC AUTO-ENTMCNC: 32.6 G/DL (ref 31–36)
MCV RBC AUTO: 82.3 FL (ref 80–100)
MONOCYTES ABSOLUTE: 0.6 K/UL (ref 0–1.3)
MONOCYTES RELATIVE PERCENT: 6.3 %
NEUTROPHILS ABSOLUTE: 7.4 K/UL (ref 1.7–7.7)
NEUTROPHILS RELATIVE PERCENT: 74.6 %
PDW BLD-RTO: 16.8 % (ref 12.4–15.4)
PERFORMED ON: ABNORMAL
PERFORMED ON: ABNORMAL
PERFORMED ON: NORMAL
PLATELET # BLD: 267 K/UL (ref 135–450)
PMV BLD AUTO: 7.4 FL (ref 5–10.5)
POC ACT LR: 154 SEC
POC ACT LR: 220 SEC
POC ACT LR: 247 SEC
POC ACT LR: 277 SEC
POTASSIUM REFLEX MAGNESIUM: 3.7 MMOL/L (ref 3.5–5.1)
PRO-BNP: 72 PG/ML (ref 0–124)
PROTHROMBIN TIME: 10.6 SEC (ref 9.9–12.7)
RBC # BLD: 4.9 M/UL (ref 4.2–5.9)
SODIUM BLD-SCNC: 138 MMOL/L (ref 136–145)
TOTAL PROTEIN: 6.9 G/DL (ref 6.4–8.2)
TROPONIN: 0.16 NG/ML
TROPONIN: 0.22 NG/ML
TROPONIN: 0.27 NG/ML
TROPONIN: 1.46 NG/ML
WBC # BLD: 9.8 K/UL (ref 4–11)

## 2021-07-03 PROCEDURE — 2060000000 HC ICU INTERMEDIATE R&B

## 2021-07-03 PROCEDURE — 36415 COLL VENOUS BLD VENIPUNCTURE: CPT

## 2021-07-03 PROCEDURE — 6360000002 HC RX W HCPCS: Performed by: INTERNAL MEDICINE

## 2021-07-03 PROCEDURE — 2709999900 HC NON-CHARGEABLE SUPPLY

## 2021-07-03 PROCEDURE — C1894 INTRO/SHEATH, NON-LASER: HCPCS

## 2021-07-03 PROCEDURE — 71260 CT THORAX DX C+: CPT

## 2021-07-03 PROCEDURE — 99153 MOD SED SAME PHYS/QHP EA: CPT

## 2021-07-03 PROCEDURE — C1874 STENT, COATED/COV W/DEL SYS: HCPCS

## 2021-07-03 PROCEDURE — 93458 L HRT ARTERY/VENTRICLE ANGIO: CPT | Performed by: INTERNAL MEDICINE

## 2021-07-03 PROCEDURE — 99284 EMERGENCY DEPT VISIT MOD MDM: CPT

## 2021-07-03 PROCEDURE — 83880 ASSAY OF NATRIURETIC PEPTIDE: CPT

## 2021-07-03 PROCEDURE — 93005 ELECTROCARDIOGRAM TRACING: CPT | Performed by: EMERGENCY MEDICINE

## 2021-07-03 PROCEDURE — 6370000000 HC RX 637 (ALT 250 FOR IP): Performed by: INTERNAL MEDICINE

## 2021-07-03 PROCEDURE — 6370000000 HC RX 637 (ALT 250 FOR IP)

## 2021-07-03 PROCEDURE — 84484 ASSAY OF TROPONIN QUANT: CPT

## 2021-07-03 PROCEDURE — 96374 THER/PROPH/DIAG INJ IV PUSH: CPT

## 2021-07-03 PROCEDURE — 92941 PRQ TRLML REVSC TOT OCCL AMI: CPT | Performed by: INTERNAL MEDICINE

## 2021-07-03 PROCEDURE — 4A023N7 MEASUREMENT OF CARDIAC SAMPLING AND PRESSURE, LEFT HEART, PERCUTANEOUS APPROACH: ICD-10-PCS | Performed by: INTERNAL MEDICINE

## 2021-07-03 PROCEDURE — 6370000000 HC RX 637 (ALT 250 FOR IP): Performed by: EMERGENCY MEDICINE

## 2021-07-03 PROCEDURE — 93458 L HRT ARTERY/VENTRICLE ANGIO: CPT

## 2021-07-03 PROCEDURE — 6360000004 HC RX CONTRAST MEDICATION: Performed by: EMERGENCY MEDICINE

## 2021-07-03 PROCEDURE — 93005 ELECTROCARDIOGRAM TRACING: CPT | Performed by: INTERNAL MEDICINE

## 2021-07-03 PROCEDURE — 6360000002 HC RX W HCPCS: Performed by: EMERGENCY MEDICINE

## 2021-07-03 PROCEDURE — 027236Z DILATION OF CORONARY ARTERY, THREE ARTERIES WITH THREE DRUG-ELUTING INTRALUMINAL DEVICES, PERCUTANEOUS APPROACH: ICD-10-PCS | Performed by: INTERNAL MEDICINE

## 2021-07-03 PROCEDURE — 83690 ASSAY OF LIPASE: CPT

## 2021-07-03 PROCEDURE — 2580000003 HC RX 258: Performed by: INTERNAL MEDICINE

## 2021-07-03 PROCEDURE — C1760 CLOSURE DEV, VASC: HCPCS

## 2021-07-03 PROCEDURE — 80053 COMPREHEN METABOLIC PANEL: CPT

## 2021-07-03 PROCEDURE — 85730 THROMBOPLASTIN TIME PARTIAL: CPT

## 2021-07-03 PROCEDURE — 83036 HEMOGLOBIN GLYCOSYLATED A1C: CPT

## 2021-07-03 PROCEDURE — 99152 MOD SED SAME PHYS/QHP 5/>YRS: CPT

## 2021-07-03 PROCEDURE — 6360000004 HC RX CONTRAST MEDICATION: Performed by: INTERNAL MEDICINE

## 2021-07-03 PROCEDURE — 92928 PRQ TCAT PLMT NTRAC ST 1 LES: CPT | Performed by: INTERNAL MEDICINE

## 2021-07-03 PROCEDURE — 96375 TX/PRO/DX INJ NEW DRUG ADDON: CPT

## 2021-07-03 PROCEDURE — 99255 IP/OBS CONSLTJ NEW/EST HI 80: CPT | Performed by: INTERNAL MEDICINE

## 2021-07-03 PROCEDURE — 71046 X-RAY EXAM CHEST 2 VIEWS: CPT

## 2021-07-03 PROCEDURE — 2500000003 HC RX 250 WO HCPCS

## 2021-07-03 PROCEDURE — 6360000002 HC RX W HCPCS

## 2021-07-03 PROCEDURE — C1887 CATHETER, GUIDING: HCPCS

## 2021-07-03 PROCEDURE — B2111ZZ FLUOROSCOPY OF MULTIPLE CORONARY ARTERIES USING LOW OSMOLAR CONTRAST: ICD-10-PCS | Performed by: INTERNAL MEDICINE

## 2021-07-03 PROCEDURE — 85347 COAGULATION TIME ACTIVATED: CPT

## 2021-07-03 PROCEDURE — 85610 PROTHROMBIN TIME: CPT

## 2021-07-03 PROCEDURE — 85025 COMPLETE CBC W/AUTO DIFF WBC: CPT

## 2021-07-03 PROCEDURE — 92928 PRQ TCAT PLMT NTRAC ST 1 LES: CPT

## 2021-07-03 PROCEDURE — 94660 CPAP INITIATION&MGMT: CPT

## 2021-07-03 PROCEDURE — C1769 GUIDE WIRE: HCPCS

## 2021-07-03 RX ORDER — MORPHINE SULFATE 4 MG/ML
4 INJECTION, SOLUTION INTRAMUSCULAR; INTRAVENOUS ONCE
Status: COMPLETED | OUTPATIENT
Start: 2021-07-03 | End: 2021-07-03

## 2021-07-03 RX ORDER — INSULIN LISPRO 100 [IU]/ML
0-6 INJECTION, SOLUTION INTRAVENOUS; SUBCUTANEOUS
Status: DISCONTINUED | OUTPATIENT
Start: 2021-07-03 | End: 2021-07-05 | Stop reason: HOSPADM

## 2021-07-03 RX ORDER — SODIUM CHLORIDE 0.9 % (FLUSH) 0.9 %
5-40 SYRINGE (ML) INJECTION EVERY 12 HOURS SCHEDULED
Status: DISCONTINUED | OUTPATIENT
Start: 2021-07-03 | End: 2021-07-03 | Stop reason: SDUPTHER

## 2021-07-03 RX ORDER — SODIUM CHLORIDE 0.9 % (FLUSH) 0.9 %
5-40 SYRINGE (ML) INJECTION EVERY 12 HOURS SCHEDULED
Status: DISCONTINUED | OUTPATIENT
Start: 2021-07-03 | End: 2021-07-05 | Stop reason: HOSPADM

## 2021-07-03 RX ORDER — METOPROLOL TARTRATE 50 MG/1
50 TABLET, FILM COATED ORAL 2 TIMES DAILY
Status: DISCONTINUED | OUTPATIENT
Start: 2021-07-03 | End: 2021-07-05 | Stop reason: HOSPADM

## 2021-07-03 RX ORDER — POLYETHYLENE GLYCOL 3350 17 G/17G
17 POWDER, FOR SOLUTION ORAL DAILY PRN
Status: DISCONTINUED | OUTPATIENT
Start: 2021-07-03 | End: 2021-07-05 | Stop reason: HOSPADM

## 2021-07-03 RX ORDER — HEPARIN SODIUM 1000 [USP'U]/ML
2000 INJECTION, SOLUTION INTRAVENOUS; SUBCUTANEOUS PRN
Status: DISCONTINUED | OUTPATIENT
Start: 2021-07-03 | End: 2021-07-04

## 2021-07-03 RX ORDER — SODIUM CHLORIDE 9 MG/ML
INJECTION, SOLUTION INTRAVENOUS CONTINUOUS
Status: DISCONTINUED | OUTPATIENT
Start: 2021-07-04 | End: 2021-07-04

## 2021-07-03 RX ORDER — SODIUM CHLORIDE 9 MG/ML
INJECTION, SOLUTION INTRAVENOUS CONTINUOUS
Status: DISCONTINUED | OUTPATIENT
Start: 2021-07-03 | End: 2021-07-03

## 2021-07-03 RX ORDER — INSULIN LISPRO 100 [IU]/ML
0-3 INJECTION, SOLUTION INTRAVENOUS; SUBCUTANEOUS NIGHTLY
Status: DISCONTINUED | OUTPATIENT
Start: 2021-07-03 | End: 2021-07-05 | Stop reason: HOSPADM

## 2021-07-03 RX ORDER — EPTIFIBATIDE 0.75 MG/ML
2 INJECTION, SOLUTION INTRAVENOUS CONTINUOUS
Status: DISPENSED | OUTPATIENT
Start: 2021-07-03 | End: 2021-07-03

## 2021-07-03 RX ORDER — ACETAMINOPHEN 650 MG/1
650 SUPPOSITORY RECTAL EVERY 6 HOURS PRN
Status: DISCONTINUED | OUTPATIENT
Start: 2021-07-03 | End: 2021-07-05 | Stop reason: HOSPADM

## 2021-07-03 RX ORDER — ATROPINE SULFATE 0.4 MG/ML
0.5 AMPUL (ML) INJECTION
Status: ACTIVE | OUTPATIENT
Start: 2021-07-03 | End: 2021-07-03

## 2021-07-03 RX ORDER — ACETAMINOPHEN 325 MG/1
650 TABLET ORAL EVERY 4 HOURS PRN
Status: DISCONTINUED | OUTPATIENT
Start: 2021-07-03 | End: 2021-07-05 | Stop reason: HOSPADM

## 2021-07-03 RX ORDER — DEXTROSE MONOHYDRATE 25 G/50ML
12.5 INJECTION, SOLUTION INTRAVENOUS PRN
Status: DISCONTINUED | OUTPATIENT
Start: 2021-07-03 | End: 2021-07-05 | Stop reason: HOSPADM

## 2021-07-03 RX ORDER — LOSARTAN POTASSIUM 25 MG/1
25 TABLET ORAL DAILY
Status: DISCONTINUED | OUTPATIENT
Start: 2021-07-03 | End: 2021-07-05 | Stop reason: HOSPADM

## 2021-07-03 RX ORDER — ASPIRIN 81 MG/1
81 TABLET, CHEWABLE ORAL DAILY
Status: DISCONTINUED | OUTPATIENT
Start: 2021-07-04 | End: 2021-07-03 | Stop reason: SDUPTHER

## 2021-07-03 RX ORDER — AMLODIPINE BESYLATE 5 MG/1
5 TABLET ORAL DAILY
Status: DISCONTINUED | OUTPATIENT
Start: 2021-07-04 | End: 2021-07-04

## 2021-07-03 RX ORDER — ATORVASTATIN CALCIUM 40 MG/1
40 TABLET, FILM COATED ORAL ONCE
Status: COMPLETED | OUTPATIENT
Start: 2021-07-03 | End: 2021-07-03

## 2021-07-03 RX ORDER — DEXTROSE MONOHYDRATE 50 MG/ML
100 INJECTION, SOLUTION INTRAVENOUS PRN
Status: DISCONTINUED | OUTPATIENT
Start: 2021-07-03 | End: 2021-07-05 | Stop reason: HOSPADM

## 2021-07-03 RX ORDER — NITROGLYCERIN 0.4 MG/1
0.4 TABLET SUBLINGUAL EVERY 5 MIN PRN
Status: DISCONTINUED | OUTPATIENT
Start: 2021-07-03 | End: 2021-07-05 | Stop reason: HOSPADM

## 2021-07-03 RX ORDER — MORPHINE SULFATE 2 MG/ML
2 INJECTION, SOLUTION INTRAMUSCULAR; INTRAVENOUS
Status: ACTIVE | OUTPATIENT
Start: 2021-07-03 | End: 2021-07-03

## 2021-07-03 RX ORDER — ONDANSETRON 2 MG/ML
4 INJECTION INTRAMUSCULAR; INTRAVENOUS EVERY 6 HOURS PRN
Status: DISCONTINUED | OUTPATIENT
Start: 2021-07-03 | End: 2021-07-05 | Stop reason: HOSPADM

## 2021-07-03 RX ORDER — ACETAMINOPHEN 325 MG/1
650 TABLET ORAL EVERY 6 HOURS PRN
Status: DISCONTINUED | OUTPATIENT
Start: 2021-07-03 | End: 2021-07-05 | Stop reason: HOSPADM

## 2021-07-03 RX ORDER — SODIUM CHLORIDE 9 MG/ML
25 INJECTION, SOLUTION INTRAVENOUS PRN
Status: DISCONTINUED | OUTPATIENT
Start: 2021-07-03 | End: 2021-07-03 | Stop reason: SDUPTHER

## 2021-07-03 RX ORDER — HEPARIN SODIUM 1000 [USP'U]/ML
4000 INJECTION, SOLUTION INTRAVENOUS; SUBCUTANEOUS ONCE
Status: COMPLETED | OUTPATIENT
Start: 2021-07-03 | End: 2021-07-03

## 2021-07-03 RX ORDER — SODIUM CHLORIDE 9 MG/ML
INJECTION, SOLUTION INTRAVENOUS CONTINUOUS
Status: ACTIVE | OUTPATIENT
Start: 2021-07-03 | End: 2021-07-04

## 2021-07-03 RX ORDER — ATORVASTATIN CALCIUM 40 MG/1
40 TABLET, FILM COATED ORAL NIGHTLY
Status: DISCONTINUED | OUTPATIENT
Start: 2021-07-04 | End: 2021-07-05 | Stop reason: HOSPADM

## 2021-07-03 RX ORDER — MORPHINE SULFATE 2 MG/ML
2 INJECTION, SOLUTION INTRAMUSCULAR; INTRAVENOUS
Status: DISCONTINUED | OUTPATIENT
Start: 2021-07-03 | End: 2021-07-05 | Stop reason: HOSPADM

## 2021-07-03 RX ORDER — ASPIRIN 81 MG/1
324 TABLET, CHEWABLE ORAL ONCE
Status: COMPLETED | OUTPATIENT
Start: 2021-07-03 | End: 2021-07-03

## 2021-07-03 RX ORDER — SODIUM CHLORIDE 0.9 % (FLUSH) 0.9 %
5-40 SYRINGE (ML) INJECTION PRN
Status: DISCONTINUED | OUTPATIENT
Start: 2021-07-03 | End: 2021-07-05 | Stop reason: HOSPADM

## 2021-07-03 RX ORDER — LORAZEPAM 0.5 MG/1
0.5 TABLET ORAL EVERY 4 HOURS PRN
Status: DISCONTINUED | OUTPATIENT
Start: 2021-07-03 | End: 2021-07-05 | Stop reason: HOSPADM

## 2021-07-03 RX ORDER — HEPARIN SODIUM 1000 [USP'U]/ML
4000 INJECTION, SOLUTION INTRAVENOUS; SUBCUTANEOUS PRN
Status: DISCONTINUED | OUTPATIENT
Start: 2021-07-03 | End: 2021-07-04

## 2021-07-03 RX ORDER — ATORVASTATIN CALCIUM 40 MG/1
40 TABLET, FILM COATED ORAL NIGHTLY
Status: DISCONTINUED | OUTPATIENT
Start: 2021-07-04 | End: 2021-07-03

## 2021-07-03 RX ORDER — SODIUM CHLORIDE 9 MG/ML
25 INJECTION, SOLUTION INTRAVENOUS PRN
Status: DISCONTINUED | OUTPATIENT
Start: 2021-07-03 | End: 2021-07-05 | Stop reason: HOSPADM

## 2021-07-03 RX ORDER — ATORVASTATIN CALCIUM 80 MG/1
80 TABLET, FILM COATED ORAL NIGHTLY
Status: DISCONTINUED | OUTPATIENT
Start: 2021-07-04 | End: 2021-07-03

## 2021-07-03 RX ORDER — ONDANSETRON 2 MG/ML
4 INJECTION INTRAMUSCULAR; INTRAVENOUS EVERY 6 HOURS PRN
Status: DISCONTINUED | OUTPATIENT
Start: 2021-07-03 | End: 2021-07-03 | Stop reason: SDUPTHER

## 2021-07-03 RX ORDER — NICOTINE POLACRILEX 4 MG
15 LOZENGE BUCCAL PRN
Status: DISCONTINUED | OUTPATIENT
Start: 2021-07-03 | End: 2021-07-05 | Stop reason: HOSPADM

## 2021-07-03 RX ORDER — HEPARIN SODIUM 10000 [USP'U]/100ML
5-30 INJECTION, SOLUTION INTRAVENOUS CONTINUOUS
Status: DISCONTINUED | OUTPATIENT
Start: 2021-07-03 | End: 2021-07-04

## 2021-07-03 RX ORDER — SODIUM CHLORIDE 0.9 % (FLUSH) 0.9 %
5-40 SYRINGE (ML) INJECTION PRN
Status: DISCONTINUED | OUTPATIENT
Start: 2021-07-03 | End: 2021-07-03 | Stop reason: SDUPTHER

## 2021-07-03 RX ORDER — ASPIRIN 81 MG/1
81 TABLET ORAL DAILY
Status: DISCONTINUED | OUTPATIENT
Start: 2021-07-04 | End: 2021-07-05 | Stop reason: HOSPADM

## 2021-07-03 RX ADMIN — IOPAMIDOL 80 ML: 755 INJECTION, SOLUTION INTRAVENOUS at 11:44

## 2021-07-03 RX ADMIN — HEPARIN SODIUM 4000 UNITS: 1000 INJECTION INTRAVENOUS; SUBCUTANEOUS at 12:45

## 2021-07-03 RX ADMIN — METOPROLOL TARTRATE 25 MG: 25 TABLET, FILM COATED ORAL at 12:48

## 2021-07-03 RX ADMIN — EPTIFIBATIDE 2 MCG/KG/MIN: 0.75 INJECTION INTRAVENOUS at 21:38

## 2021-07-03 RX ADMIN — METOPROLOL TARTRATE 50 MG: 50 TABLET, FILM COATED ORAL at 21:39

## 2021-07-03 RX ADMIN — NITROGLYCERIN 0.4 MG: 0.4 TABLET SUBLINGUAL at 13:17

## 2021-07-03 RX ADMIN — NITROGLYCERIN 0.4 MG: 0.4 TABLET SUBLINGUAL at 15:28

## 2021-07-03 RX ADMIN — MORPHINE SULFATE 4 MG: 4 INJECTION, SOLUTION INTRAMUSCULAR; INTRAVENOUS at 12:54

## 2021-07-03 RX ADMIN — MORPHINE SULFATE 2 MG: 2 INJECTION, SOLUTION INTRAMUSCULAR; INTRAVENOUS at 14:39

## 2021-07-03 RX ADMIN — MORPHINE SULFATE 2 MG: 2 INJECTION, SOLUTION INTRAMUSCULAR; INTRAVENOUS at 15:51

## 2021-07-03 RX ADMIN — ATORVASTATIN CALCIUM 40 MG: 40 TABLET, FILM COATED ORAL at 14:43

## 2021-07-03 RX ADMIN — IOHEXOL 350 ML: 350 INJECTION, SOLUTION INTRAVENOUS at 17:28

## 2021-07-03 RX ADMIN — SODIUM CHLORIDE: 9 INJECTION, SOLUTION INTRAVENOUS at 19:22

## 2021-07-03 RX ADMIN — ATORVASTATIN CALCIUM 40 MG: 40 TABLET, FILM COATED ORAL at 12:48

## 2021-07-03 RX ADMIN — METOPROLOL TARTRATE 25 MG: 25 TABLET, FILM COATED ORAL at 16:05

## 2021-07-03 RX ADMIN — HEPARIN SODIUM 5 UNITS/KG/HR: 10000 INJECTION, SOLUTION INTRAVENOUS at 12:44

## 2021-07-03 RX ADMIN — LOSARTAN POTASSIUM 25 MG: 25 TABLET, FILM COATED ORAL at 21:39

## 2021-07-03 RX ADMIN — NITROGLYCERIN 1 INCH: 20 OINTMENT TOPICAL at 11:29

## 2021-07-03 RX ADMIN — ASPIRIN 324 MG: 81 TABLET, CHEWABLE ORAL at 11:23

## 2021-07-03 ASSESSMENT — PAIN SCALES - GENERAL
PAINLEVEL_OUTOF10: 7
PAINLEVEL_OUTOF10: 8
PAINLEVEL_OUTOF10: 0
PAINLEVEL_OUTOF10: 0
PAINLEVEL_OUTOF10: 3
PAINLEVEL_OUTOF10: 8
PAINLEVEL_OUTOF10: 0
PAINLEVEL_OUTOF10: 8
PAINLEVEL_OUTOF10: 4

## 2021-07-03 ASSESSMENT — PAIN DESCRIPTION - PAIN TYPE
TYPE: ACUTE PAIN

## 2021-07-03 ASSESSMENT — PAIN DESCRIPTION - FREQUENCY
FREQUENCY: CONTINUOUS
FREQUENCY: CONTINUOUS

## 2021-07-03 ASSESSMENT — PAIN DESCRIPTION - PROGRESSION: CLINICAL_PROGRESSION: NOT CHANGED

## 2021-07-03 ASSESSMENT — ENCOUNTER SYMPTOMS
SORE THROAT: 0
NAUSEA: 0
VOMITING: 0
COUGH: 0

## 2021-07-03 ASSESSMENT — PAIN DESCRIPTION - DESCRIPTORS
DESCRIPTORS: ACHING
DESCRIPTORS: DULL
DESCRIPTORS: DULL;PRESSURE

## 2021-07-03 ASSESSMENT — PAIN DESCRIPTION - ORIENTATION
ORIENTATION: RIGHT
ORIENTATION: RIGHT
ORIENTATION: MID
ORIENTATION: LEFT;MID

## 2021-07-03 ASSESSMENT — PAIN DESCRIPTION - LOCATION
LOCATION: CHEST
LOCATION: GROIN
LOCATION: CHEST
LOCATION: GROIN

## 2021-07-03 ASSESSMENT — HEART SCORE: ECG: 1

## 2021-07-03 ASSESSMENT — PAIN - FUNCTIONAL ASSESSMENT: PAIN_FUNCTIONAL_ASSESSMENT: ACTIVITIES ARE NOT PREVENTED

## 2021-07-03 ASSESSMENT — PAIN DESCRIPTION - ONSET: ONSET: ON-GOING

## 2021-07-03 NOTE — PROGRESS NOTES
Pt to cath lab with wife, Akash huang at bedside. Heparin gtt stopped prior to transport per verbal order by Dr. Patricia Maravilla. 2nd IV placed to left hand.

## 2021-07-03 NOTE — H&P
Hospital Medicine History & Physical      PCP: Lottie Rodrigues MD    Date of Admission: 7/3/2021    Date of Service: Pt seen/examined on 7/3/2021 and Admitted to Inpatient with expected LOS greater than two midnights due to medical therapy. Chief Complaint:  Chest pain      History Of Present Illness: Patient is 50-year-old male with history of morbid obesity, hypertension, diabetic came into ER with a complaint of chest pain. Patient reported that he started having upper back pain 2 days ago but since yesterday it is radiating towards his chest felt like pressure like someone sitting on his chest she has some nausea. He reports that he is at baseline shortness of breath due to his body habitus. Denies any lightheadedness,  vomiting, fever, chills. On arrival hemodynamically stable. EKG no acute ST changes. CT PE no DVT.   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 [address] 11/12/2016       Past Surgical History:          Procedure Laterality Date    HERNIA REPAIR      2011    OTHER SURGICAL HISTORY  4/20/2015    EXCISIONAL DEBRIDEMENT AND IRRIGATION OF ABDOMINAL WOUND    TONSILLECTOMY AND ADENOIDECTOMY      VENTRAL HERNIA REPAIR  3/23/15    with mesh    VENTRAL HERNIA REPAIR  6/2/16       Medications Prior to Admission:      Prior to Admission medications    Medication Sig Start Date End Date Taking? Authorizing Provider   sertraline (ZOLOFT) 50 MG tablet TAKE ONE TABLET BY MOUTH DAILY 5/21/21  Yes Андрей Malloy MD   metFORMIN (GLUCOPHAGE) 500 MG tablet TAKE ONE TABLET BY MOUTH ONCE DAILY WITH BREAKFAST 5/21/21  Yes Андрей Malloy MD   losartan-hydroCHLOROthiazide (HYZAAR) 100-25 MG per tablet TAKE 1 TABLET BY MOUTH ONCE DAILY 5/21/21  Yes Андрей Malloy MD   buPROPion (ZYBAN) 150 MG extended release tablet TAKE ONE TABLET BY MOUTH TWICE DAILY 5/21/21  Yes Андрей Malloy MD   allopurinol (ZYLOPRIM) 100 MG tablet Take 1 tablet by mouth daily 5/21/21  Yes Андрей Malloy MD   amLODIPine (NORVASC) 5 MG tablet Take 1 tablet by mouth daily 5/21/21  Yes Андрей Malloy MD   Potassium Gluconate 595 (99 K) MG TABS Take by mouth   Yes Historical Provider, MD   Cholecalciferol (VITAMIN D) 50 MCG (2000 UT) CAPS capsule One po qd 11/19/19   Андрей Malloy MD   Ascorbic Acid (VITAMIN C) 250 MG tablet Take 250 mg by mouth daily    Historical Provider, MD   Probiotic Product (PROBIOTIC DAILY PO) Take by mouth    Historical Provider, MD       Allergies:  Patient has no known allergies. Social History:      The patient currently lives home, independent    TOBACCO:   reports that he quit smoking about 3 years ago. He has a 2.00 pack-year smoking history. He has never used smokeless tobacco.  ETOH:   reports no history of alcohol use. Family History:       Reviewed in detail and . Positive as follows:        Problem Relation Age of Onset   [de-identified] Cancer Father         brain tumor and prostate cancer    High Blood Pressure Mother     Stroke Mother 27   [de-identified] Other Mother         blood clots    Coronary Art Dis Mother 36    Hypertension Mother     High Blood Pressure Sister     Other Sister         blood clots    Hypertension Sister        REVIEW OF SYSTEMS:   Pertinent positives as noted in the HPI.  All other systems reviewed and negative. PHYSICAL EXAM PERFORMED:    /87   Pulse 95   Temp 98.3 °F (36.8 °C) (Oral)   Resp 18   Ht 5' 7\" (1.702 m)   Wt (!) 412 lb (186.9 kg)   SpO2 94%   BMI 64.53 kg/m²     General appearance:  No apparent distress, appears stated age and cooperative. Obese  HEENT:  Normal cephalic, atraumatic without obvious deformity. Pupils equal, round, and reactive to light. Extra ocular muscles intact. Conjunctivae/corneas clear. Neck: Supple, with full range of motion. No jugular venous distention. Trachea midline. Respiratory:  Normal respiratory effort. Clear to auscultation, bilaterally without Rales/Wheezes/Rhonchi. Cardiovascular:  Regular rate and rhythm with normal S1/S2 without murmurs, rubs or gallops. Abdomen: Soft, non-tender, non-distended with normal bowel sounds. Musculoskeletal: Bilateral 1+ lower extremity edema. Skin: Skin color, texture, turgor normal.  No rashes or lesions. Neurologic:  Neurovascularly intact without any focal sensory/motor deficits.  Cranial nerves: II-XII intact, grossly non-focal.  Psychiatric:  Alert and oriented, thought content appropriate, normal insight  Capillary Refill: Brisk,< 3 seconds   Peripheral Pulses: +2 palpable, equal bilaterally       Labs:     Recent Labs     07/03/21  1022   WBC 9.8   HGB 13.2*   HCT 40.4*        Recent Labs     07/03/21  1022      K 3.7   CL 98*   CO2 28   BUN 17   CREATININE 1.0   CALCIUM 10.0     Recent Labs     07/03/21  1022   AST 34   ALT 44*   BILITOT <0.2   ALKPHOS 115     Recent Labs     07/03/21  1022   INR 0.94     Recent Labs     07/03/21  1022 07/03/21  1321   TROPONINI 0.16* 0.22*       Urinalysis:      Lab Results   Component Value Date    NITRU Negative 03/10/2016    WBCUA 0 03/10/2016    RBCUA 3 03/10/2016    BLOODU Negative 03/10/2016    SPECGRAV 1.020 03/10/2016    GLUCOSEU Negative 03/10/2016       Radiology:     CXR: I have reviewed the CXR with the following interpretation: See

## 2021-07-03 NOTE — ED NOTES
Patient calling out stating that his chest pain is worsening. MD notified. Pain meds entered and repeat EKG being completed now.      Olamide Galvan RN  07/03/21 0797

## 2021-07-03 NOTE — ED PROVIDER NOTES
[address] [facility]          ATTENDING PHYSICIAN NOTE       Date of evaluation: 7/3/2021    Chief Complaint     Chest Pain (since 2am, woke up patient from sleep, started as back pain and moved into chest, +nausea, slight SOB)      History of Present Illness     Verónica Chilel is a 40 y.o. male who presents to the emergency department for evaluation of discomfort that started between his shoulder blades last night, and has now migrated/radiated to his central anterior chest.  Rates it 4 out of 10 and constant. Nonexertional, nonpleuritic, without provoking or palliative features. He has never felt like this before. He feels slightly short of breath but no wheezing or choking. He has felt some mild nausea, but has had no vomiting. Denies fevers and chills, denies any worsening lower extremity edema or new leg pain. No trauma. No sick contacts. No recent medication changes, and states he has taken all of his medications as prescribed including today. Symptoms started roughly 8 hours prior to arrival.    Review of Systems     Review of Systems   Constitutional: Negative for fever. HENT: Negative for sore throat. Respiratory: Negative for cough. Cardiovascular: Negative for palpitations. Gastrointestinal: Negative for nausea and vomiting. Neurological: Negative for dizziness, weakness and headaches. All other systems reviewed and are negative.       Past Medical, Surgical, Family, and Social History     He has a past medical history of Abdominal hernia, Alcohol abuse, Anxiety, Clostridium difficile diarrhea, Clostridium difficile diarrhea, DVT (deep venous thrombosis) ([address] DVT of axillary vein, acute left ([address] Gout, H/O ulcer disease, Hernia, History of blood transfusion, Hyperlipemia, Hypertension, Hypertension, essential, Kidney congenitally absent, left, Kidney disease, Lightheaded, Obesity, Obstructive sleep apnea (adult) (pediatric), Renal agenesis, and Severe single current episode of major depressive disorder, without psychotic features ([facility] [address] He has a past surgical history that includes hernia repair; ventral hernia repair (3/23/15); other surgical history (4/20/2015); ventral hernia repair (6/2/16); and Tonsillectomy and adenoidectomy. His family history includes Cancer in his father; Coronary Art Dis (age of onset: 36) in his mother; High Blood Pressure in his mother and sister; Hypertension in his mother and sister; Other in his mother and sister; Stroke (age of onset: 27) in his mother. He reports that he quit smoking about 3 years ago. He has a 2.00 pack-year smoking history. He has never used smokeless tobacco. He reports that he does not drink alcohol and does not use drugs. Medications     Previous Medications    ALLOPURINOL (ZYLOPRIM) 100 MG TABLET    Take 1 tablet by mouth daily    AMLODIPINE (NORVASC) 5 MG TABLET    Take 1 tablet by mouth daily    ASCORBIC ACID (VITAMIN C) 250 MG TABLET    Take 250 mg by mouth daily    BUPROPION (ZYBAN) 150 MG EXTENDED RELEASE TABLET    TAKE ONE TABLET BY MOUTH TWICE DAILY    CHOLECALCIFEROL (VITAMIN D) 50 MCG (2000 UT) CAPS CAPSULE    One po qd    LOSARTAN-HYDROCHLOROTHIAZIDE (HYZAAR) 100-25 MG PER TABLET    TAKE 1 TABLET BY MOUTH ONCE DAILY    METFORMIN (GLUCOPHAGE) 500 MG TABLET    TAKE ONE TABLET BY MOUTH ONCE DAILY WITH BREAKFAST    POTASSIUM GLUCONATE 595 (99 K) MG TABS    Take by mouth    PROBIOTIC PRODUCT (PROBIOTIC DAILY PO)    Take by mouth    SERTRALINE (ZOLOFT) 50 MG TABLET    TAKE ONE TABLET BY MOUTH DAILY       Allergies     He has No Known Allergies. Physical Exam     VITALS: BP (!) 164/88   Pulse 92   Temp 98.5 °F (36.9 °C)   Resp 23   Ht 5' 7\" (1.702 m)   Wt (!) 412 lb (186.9 kg)   SpO2 97%   BMI 64.53 kg/m²    General: Well developed, well nourished male in no acute distress.   HEENT: Sclera white, conjunctiva pink, mucous membranes moist and oropharynx clear  Neck: Supple, no meningismus or JVD  Respirations: Unlabored with symmetric chest rise and fall, lungs clear  CV: Regular rate and rhythm with strong distal pulses  Abd: Soft without rebound guarding distention or focal tenderness, multiple surgical scars  Musculoskeletal: Moves all extremities with no signs of orthopedic trauma; 2+ symmetric bilateral lower extremity pitting edema. Skin: Warm and dry with no rashes or lesions. Neuro: Awake and alert with no focal neurologic deficits. Normal speech and facial symmetry. Psych: Mood and affect are normal.    Diagnostic Results     EKG   Initial EKG done at 10:12 AM is nondiagnostic because of significant underlying artifact. EKG repeated at 10:22 AM for the indication of chest pain/shortness of breath is interpreted by me. Normal sinus rhythm, normal axis and intervals, rate is 99. Less than 1 mm of ST depression seen in leads II, V3, V4, V5. No ST segment elevations. T waves are of normal morphology. Interpretation: Normal sinus rhythm with nonspecific changes, but concern for ischemia. RADIOLOGY:  CT CHEST PULMONARY EMBOLISM W CONTRAST   Final Result   1. No evidence for pulmonary embolism   2. Nonspecific mild lymphadenopathy bilateral axillary regions   3. Moderate diffuse fatty change of liver. Recommend correlation with liver function tests for significance.       XR CHEST (2 VW)   Final Result   Mild cardia megaly          LABS:   Results for orders placed or performed during the hospital encounter of 07/03/21   CBC Auto Differential   Result Value Ref Range    WBC 9.8 4.0 - 11.0 K/uL    RBC 4.90 4.20 - 5.90 M/uL    Hemoglobin 13.2 (L) 13.5 - 17.5 g/dL    Hematocrit 40.4 (L) 40.5 - 52.5 %    MCV 82.3 80.0 - 100.0 fL    MCH 26.8 26.0 - 34.0 pg    MCHC 32.6 31.0 - 36.0 g/dL    RDW 16.8 (H) 12.4 - 15.4 %    Platelets 627 400 - 506 K/uL    MPV 7.4 5.0 - 10.5 fL    Neutrophils % 74.6 %    Lymphocytes % 16.3 %    Monocytes % 6.3 %    Eosinophils % 2.3 %    Basophils % 0.5 %    Neutrophils Absolute 7.4 1.7 - 7.7 K/uL    Lymphocytes Absolute 1.6 1.0 - 5.1 K/uL    Monocytes Absolute 0.6 0.0 - 1.3 K/uL    Eosinophils Absolute 0.2 0.0 - 0.6 K/uL    Basophils Absolute 0.1 0.0 - 0.2 K/uL   Comprehensive Metabolic Panel w/ Reflex to MG   Result Value Ref Range    Sodium 138 136 - 145 mmol/L    Potassium reflex Magnesium 3.7 3.5 - 5.1 mmol/L    Chloride 98 (L) 99 - 110 mmol/L    CO2 28 21 - 32 mmol/L    Anion Gap 12 3 - 16    Glucose 145 (H) 70 - 99 mg/dL    BUN 17 7 - 20 mg/dL    CREATININE 1.0 0.9 - 1.3 mg/dL    GFR Non-African American >60 >60    GFR African American >60 >60    Calcium 10.0 8.3 - 10.6 mg/dL    Total Protein 6.9 6.4 - 8.2 g/dL    Albumin 3.9 3.4 - 5.0 g/dL    Albumin/Globulin Ratio 1.3 1.1 - 2.2    Total Bilirubin <0.2 0.0 - 1.0 mg/dL    Alkaline Phosphatase 115 40 - 129 U/L    ALT 44 (H) 10 - 40 U/L    AST 34 15 - 37 U/L    Globulin 3.0 g/dL   Lipase   Result Value Ref Range    Lipase 22.0 13.0 - 60.0 U/L   Troponin   Result Value Ref Range    Troponin 0.16 (H) <0.01 ng/mL   Brain Natriuretic Peptide   Result Value Ref Range    Pro-BNP 72 0 - 124 pg/mL   EKG 12 Lead   Result Value Ref Range    Ventricular Rate 99 BPM    Atrial Rate 99 BPM    P-R Interval 166 ms    QRS Duration 88 ms    Q-T Interval 338 ms    QTc Calculation (Bazett) 433 ms    P Axis 70 degrees    R Axis 22 degrees    T Axis -8 degrees    Diagnosis       EKG performed in ER and to be interpreted by ER physician. Confirmed by MD, ER ([id number]),  Anderson Guerrero ([phone]) on 7/3/2021 11:01:23 AM     RECENT VITALS:  BP: (!) 164/88, Temp: 98.5 °F (36.9 °C), Pulse: 92, Resp: 23, SpO2: 97 %       ED Course     Nursing Notes, Past Medical Hx, Past Surgical Hx, Social Hx, Allergies, and Family Hx were reviewed.     The patient was given the following medications:  Orders Placed This Encounter   Medications    aspirin chewable tablet 324 mg    nitroglycerin (NITRO-BID) 2 % ointment 1 inch    iopamidol (ISOVUE-370) 76 % injection 80 mL    metoprolol tartrate (LOPRESSOR) tablet 25 mg    heparin (porcine) injection 11,210 Units    heparin (porcine) injection 11,210 Units    heparin (porcine) injection 5,610 Units    heparin 25,000 units in dextrose 5% 250 mL (premix) infusion    atorvastatin (LIPITOR) tablet 40 mg     He was seen and examined on arrival to the treatment area. Frequently reassessed and kept up-to-date on results. He has remained hemodynamically stable. Nitroglycerin had some improvement in his symptoms. I spoke with the cardiologist who is comfortable with aspirin, heparin, metoprolol, and does request Lipitor, and he will evaluate the patient shortly. Hospitalist has been paged for admission. CONSULTS:  IP CONSULT TO CARDIOLOGY  IP CONSULT TO HOSPITALIST    MEDICAL DECISION MAKING / ASSESSMENT / PLAN     Stan Trammell is a 40 y.o. male presents to the emergency department with back pain that radiates towards the chest.  He had borderline tachycardia on arrival.  Initial differential diagnosis included hepatobiliary disease with referred pain, acute coronary syndrome, and pulmonary embolism. Abdominal exam is reassuring. Hepatobiliary labs are reassuring. Pulmonary embolism has been excluded by CT scan. In the setting of a positive troponin, definitive evaluation for submassive/massive PE was indicated. Once PE was excluded, treatment was focused on acute coronary syndrome. He received aspirin orally. He received metoprolol orally, and heparin and Lipitor have been ordered. I do not believe he needs to go immediately to the Cath Lab, as his symptoms are stable to improved. Critical Care:  Due to the immediate potential for life-threatening deterioration due to nSTEMI, I spent 35 minutes providing critical care.   This time excludes time spent performing procedures but includes time spent on direct patient care, history retrieval, review of the chart, and discussions with patient, and consultant(s). Clinical Impression     1. NSTEMI (non-ST elevated myocardial infarction) [facility])        Disposition       DISPOSITION Decision To Admit 07/03/2021 12:21:39 PM          Shayna Almeida MD  07/03/21 1236    Addendum:  He began complaining of sudden increase in discomfort. Morphine and additional nitro was ordered. Symptoms improved after 4 mg of morphine. Repeat EKG was performed at 1303. Indication chest pain. Interpreted me. Normal sinus rhythm, normal axis and intervals, rate 104. Nonspecific ST changes, but inferior depression seen on earlier EKG are unchanged. Interpretation: Borderline sinus tachycardia without dynamic EKG changes. I have now also spoken to the hospitalist who accepts this patient for admission.        Shayna Almeida MD  07/03/21 2761

## 2021-07-03 NOTE — CONSULTS
Cardiology Consultation                                                                    Pt Name: Urbano Libman  Age: 40 y.o. Sex: male  : 1975  Location: [facility]/2916-22    Referring Physician: Amanda Combs MD  Primary cardiologist: Dr Jack Larsen, sonya      Reason for Consult:     Reason for Consultation/Chief Complaint: NSTEMI    HPI:      Urbano Libman is a 40 y.o. male with a past medical history of morbid obesity (BMI 64), borderline DM, HTN, borderline HLP, past smoker (quit 6 to 7 years ago). Patient presented to the emergency room today with sudden onset of upper back pain (between scapula) radiating to his anterior chest as of 5 PM yesterday evening. Pain was nonexertional, nonpleuritic, 8 out of 10 in intensity. At the emergency room he was hypertensive (177/94 mmHg) otherwise hemodynamically stable. Labs within normal limits except for troponin 0.16 on presentation, up to 0.223 hours later. ECG was within normal limits, CT chest negative for PE or pulmonary edema, chest x-ray unremarkable. Patient has no previous cardiac history. He denies any recent history of exertional anginal symptoms including chest pain, shortness of breath, dizziness. He admits to a family history of premature CAD (mother with stents starting in her 45s, non-smoker). Patient was admitted for NSTEMI and cardiology was consulted for further evaluation. He was given Lipitor 80 mg p.o. x1, Lopressor 25x1, Nitrostat x1 and nitroglycerin patch, full dose aspirin, Heparin bolus and then drip. His chest pain has now improved but not completely resolved, down at 2-3 out of 10 in intensity.       Histories     Past Medical History:   has a past medical history of Abdominal hernia, Alcohol abuse, Anxiety, Clostridium difficile diarrhea, Clostridium difficile diarrhea, DVT (deep venous thrombosis) ([address] DVT of axillary vein, acute left ([address] Gout, H/O ulcer disease, Hernia, History of blood transfusion, Hyperlipemia, Hypertension, Hypertension, essential, Kidney congenitally absent, left, Kidney disease, Lightheaded, Obesity, Obstructive sleep apnea (adult) (pediatric), Renal agenesis, and Severe single current episode of major depressive disorder, without psychotic features ([facility] [address] [address] Surgical History:   has a past surgical history that includes hernia repair; ventral hernia repair (3/23/15); other surgical history (4/20/2015); ventral hernia repair (6/2/16); and Tonsillectomy and adenoidectomy. Social History:   reports that he quit smoking about 3 years ago. He has a 2.00 pack-year smoking history. He has never used smokeless tobacco. He reports that he does not drink alcohol and does not use drugs. Family History:  No evidence for sudden cardiac death or premature CAD      Medications:       Home Medications  Were reviewed and are listed in nursing record. and/or listed below  Prior to Admission medications    Medication Sig Start Date End Date Taking?  Authorizing Provider   sertraline (ZOLOFT) 50 MG tablet TAKE ONE TABLET BY MOUTH DAILY 5/21/21  Yes Dusty Sandra MD   metFORMIN (GLUCOPHAGE) 500 MG tablet TAKE ONE TABLET BY MOUTH ONCE DAILY WITH BREAKFAST 5/21/21  Yes Dusty Sandra MD   losartan-hydroCHLOROthiazide (HYZAAR) 100-25 MG per tablet TAKE 1 TABLET BY MOUTH ONCE DAILY 5/21/21  Yes Dusty Sandra MD   buPROPion (ZYBAN) 150 MG extended release tablet TAKE ONE TABLET BY MOUTH TWICE DAILY 5/21/21  Yes Dusty Sandra MD   allopurinol (ZYLOPRIM) 100 MG tablet Take 1 tablet by mouth daily 5/21/21  Yes Dusty Sandra MD   amLODIPine (NORVASC) 5 MG tablet Take 1 tablet by mouth daily 5/21/21  Yes Dusty Sandra MD   Potassium Gluconate 595 (99 K) MG TABS Take by mouth   Yes Historical Provider, MD   Cholecalciferol (VITAMIN D) 50 MCG (2000 UT) CAPS capsule One po qd 11/19/19   Dusty Sandra MD   Ascorbic Acid (VITAMIN C) 250 MG tablet Take 250 mg by mouth daily    Historical Provider, MD Probiotic Product (PROBIOTIC DAILY PO) Take by mouth    Historical Provider, MD          Inpatient Medications:   sodium chloride flush  5-40 mL Intravenous 2 times per day    [START ON 7/4/2021] aspirin  81 mg Oral Daily    nitroglycerin  1 inch Topical 4 times per day    [START ON 7/4/2021] atorvastatin  40 mg Oral Nightly    metoprolol tartrate  50 mg Oral BID    metoprolol tartrate  25 mg Oral Once       IV drips:   heparin (PORCINE) Infusion 5 Units/kg/hr (07/03/21 1244)    sodium chloride      [START ON 7/4/2021] sodium chloride         PRN:  heparin (porcine), heparin (porcine), nitroGLYCERIN, ondansetron, sodium chloride flush, sodium chloride, acetaminophen **OR** acetaminophen, polyethylene glycol, perflutren lipid microspheres, morphine    Allergy:     Patient has no known allergies. Review of Systems:     All 12 point review of symptoms completed. Pertinent positives identified in the HPI, all other review of symptoms negative as below. CONSTITUTIONAL: No fatigue  SKIN: No rash or pruritis. EYES: No visual changes or diplopia. No scleral icterus. ENT: No Headaches, hearing loss or vertigo. No mouth sores or sore throat. CARDIOVASCULAR: + chest pain/chest pressure/chest discomfort. No palpitations. No edema. RESPIRATORY: No dyspnea. No cough or wheezing, no sputum production. GASTROINTESTINAL: No N/V/D. No abdominal pain, appetite loss, blood in stools. GENITOURINARY: No dysuria, trouble voiding, or hematuria. MUSCULOSKELETAL:  No gait disturbance, weakness or joint complaints. NEUROLOGICAL: No headache, diplopia, change in muscle strength, numbness or tingling. No change in gait, balance, coordination, mood, affect, memory, mentation, behavior. ENDOCRINE: No excessive thirst, fluid intake, or urination. No tremor. HEMATOLOGIC: No abnormal bruising or bleeding. ALLERGY: No nasal congestion or hives.       Physical Examination:     Vitals:    07/03/21 1248 07/03/21 1300 TROPONINI 0.16* 0.22*     No results for input(s): BNP in the last 72 hours. No results for input(s): TSH in the last 72 hours. No results for input(s): CHOL, HDL, LDLCALC, TRIG in the last 72 hours.]    Lab Results   Component Value Date    CKTOTAL 54 01/17/2018    TROPONINI 0.22 (H) 07/03/2021         Imaging:     Telemetry:  NSR      Assessment / Plan:     1. NSTEMI:  I suspect patient's symptoms are due to ischemia. ECG is nonischemic, troponin borderline elevated but increasing at 0.22.    -Agree with full dose aspirin followed by aspirin 81 mg daily  -Continue with heparin drip, Lipitor intermediate-dose, will increase Lopressor to 50 mg twice daily  -We will keep patient n.p.o. post midnight for possible LHC tomorrow morning with Dr. Trevor Dumont. 2.  Essential hypertension:  Patient presented with severe hypertension and tachycardic most likely due to severe chest pain rather than uncontrolled hypertension.    -We will control BP with beta-blocker    3. Hyperlipidemia:  Recent lipid profile was reviewed, LDL 80s, off statin.    -Agree with Lipitor 40 daily. I have personally reviewed the reports and images of labs, radiological studies, cardiac studies including ECG's and telemetry, current and old medical records. The note was completed using EMR and Dragon dictation system. Every effort was made to ensure accuracy; however, inadvertent computerized transcription errors may be present. All questions and concerns were addressed to the patient/family. Alternatives to my treatment were discussed. I would like to thank you for providing me the opportunity to participate in the care of your patient. If you have any questions, please do not hesitate to contact me.      Leon Coronado MD, [facility], [address]  The [address]  [address] [facility]  [address] [address]  Ph: [phone]  Fax: [phone]

## 2021-07-04 LAB
ANION GAP SERPL CALCULATED.3IONS-SCNC: 11 MMOL/L (ref 3–16)
BUN BLDV-MCNC: 16 MG/DL (ref 7–20)
CALCIUM SERPL-MCNC: 9 MG/DL (ref 8.3–10.6)
CHLORIDE BLD-SCNC: 98 MMOL/L (ref 99–110)
CHOLESTEROL, TOTAL: 130 MG/DL (ref 0–199)
CO2: 24 MMOL/L (ref 21–32)
CREAT SERPL-MCNC: 1.2 MG/DL (ref 0.9–1.3)
EKG ATRIAL RATE: 104 BPM
EKG ATRIAL RATE: 83 BPM
EKG ATRIAL RATE: 90 BPM
EKG DIAGNOSIS: NORMAL
EKG P AXIS: 58 DEGREES
EKG P AXIS: 67 DEGREES
EKG P AXIS: 82 DEGREES
EKG P-R INTERVAL: 162 MS
EKG P-R INTERVAL: 170 MS
EKG P-R INTERVAL: 182 MS
EKG Q-T INTERVAL: 350 MS
EKG Q-T INTERVAL: 352 MS
EKG Q-T INTERVAL: 384 MS
EKG QRS DURATION: 84 MS
EKG QRS DURATION: 84 MS
EKG QRS DURATION: 86 MS
EKG QTC CALCULATION (BAZETT): 430 MS
EKG QTC CALCULATION (BAZETT): 451 MS
EKG QTC CALCULATION (BAZETT): 460 MS
EKG R AXIS: 23 DEGREES
EKG R AXIS: 36 DEGREES
EKG R AXIS: 73 DEGREES
EKG T AXIS: -15 DEGREES
EKG T AXIS: 11 DEGREES
EKG T AXIS: 16 DEGREES
EKG VENTRICULAR RATE: 104 BPM
EKG VENTRICULAR RATE: 83 BPM
EKG VENTRICULAR RATE: 90 BPM
ESTIMATED AVERAGE GLUCOSE: 142.7 MG/DL
GFR AFRICAN AMERICAN: >60
GFR NON-AFRICAN AMERICAN: >60
GLUCOSE BLD-MCNC: 119 MG/DL (ref 70–99)
GLUCOSE BLD-MCNC: 123 MG/DL (ref 70–99)
GLUCOSE BLD-MCNC: 128 MG/DL (ref 70–99)
GLUCOSE BLD-MCNC: 134 MG/DL (ref 70–99)
HBA1C MFR BLD: 6.6 %
HCT VFR BLD CALC: 36.6 % (ref 40.5–52.5)
HDLC SERPL-MCNC: 34 MG/DL (ref 40–60)
HEMOGLOBIN: 11.8 G/DL (ref 13.5–17.5)
LDL CHOLESTEROL CALCULATED: 67 MG/DL
MCH RBC QN AUTO: 26.5 PG (ref 26–34)
MCHC RBC AUTO-ENTMCNC: 32.2 G/DL (ref 31–36)
MCV RBC AUTO: 82.2 FL (ref 80–100)
PDW BLD-RTO: 16.8 % (ref 12.4–15.4)
PERFORMED ON: ABNORMAL
PLATELET # BLD: 245 K/UL (ref 135–450)
PMV BLD AUTO: 7.2 FL (ref 5–10.5)
POTASSIUM REFLEX MAGNESIUM: 3.6 MMOL/L (ref 3.5–5.1)
RBC # BLD: 4.46 M/UL (ref 4.2–5.9)
SODIUM BLD-SCNC: 133 MMOL/L (ref 136–145)
TRIGL SERPL-MCNC: 144 MG/DL (ref 0–150)
VLDLC SERPL CALC-MCNC: 29 MG/DL
WBC # BLD: 12.8 K/UL (ref 4–11)

## 2021-07-04 PROCEDURE — 80061 LIPID PANEL: CPT

## 2021-07-04 PROCEDURE — 2060000000 HC ICU INTERMEDIATE R&B

## 2021-07-04 PROCEDURE — 99233 SBSQ HOSP IP/OBS HIGH 50: CPT | Performed by: INTERNAL MEDICINE

## 2021-07-04 PROCEDURE — 6370000000 HC RX 637 (ALT 250 FOR IP): Performed by: INTERNAL MEDICINE

## 2021-07-04 PROCEDURE — 2580000003 HC RX 258: Performed by: INTERNAL MEDICINE

## 2021-07-04 PROCEDURE — 93005 ELECTROCARDIOGRAM TRACING: CPT | Performed by: INTERNAL MEDICINE

## 2021-07-04 PROCEDURE — 93010 ELECTROCARDIOGRAM REPORT: CPT | Performed by: INTERNAL MEDICINE

## 2021-07-04 PROCEDURE — 6360000002 HC RX W HCPCS: Performed by: INTERNAL MEDICINE

## 2021-07-04 PROCEDURE — 80048 BASIC METABOLIC PNL TOTAL CA: CPT

## 2021-07-04 PROCEDURE — 85027 COMPLETE CBC AUTOMATED: CPT

## 2021-07-04 PROCEDURE — 36415 COLL VENOUS BLD VENIPUNCTURE: CPT

## 2021-07-04 RX ORDER — ISOSORBIDE MONONITRATE 30 MG/1
30 TABLET, EXTENDED RELEASE ORAL DAILY
Status: DISCONTINUED | OUTPATIENT
Start: 2021-07-04 | End: 2021-07-05

## 2021-07-04 RX ADMIN — ASPIRIN 81 MG: 81 TABLET, COATED ORAL at 06:07

## 2021-07-04 RX ADMIN — LOSARTAN POTASSIUM 25 MG: 25 TABLET, FILM COATED ORAL at 08:11

## 2021-07-04 RX ADMIN — TICAGRELOR 90 MG: 90 TABLET ORAL at 06:07

## 2021-07-04 RX ADMIN — TICAGRELOR 90 MG: 90 TABLET ORAL at 20:36

## 2021-07-04 RX ADMIN — Medication 10 ML: at 20:36

## 2021-07-04 RX ADMIN — ENOXAPARIN SODIUM 40 MG: 40 INJECTION SUBCUTANEOUS at 15:11

## 2021-07-04 RX ADMIN — AMLODIPINE BESYLATE 5 MG: 5 TABLET ORAL at 08:11

## 2021-07-04 RX ADMIN — METOPROLOL TARTRATE 50 MG: 50 TABLET, FILM COATED ORAL at 08:11

## 2021-07-04 RX ADMIN — ISOSORBIDE MONONITRATE 30 MG: 30 TABLET, EXTENDED RELEASE ORAL at 15:11

## 2021-07-04 RX ADMIN — ATORVASTATIN CALCIUM 40 MG: 40 TABLET, FILM COATED ORAL at 20:36

## 2021-07-04 RX ADMIN — Medication 10 ML: at 08:11

## 2021-07-04 RX ADMIN — SERTRALINE HYDROCHLORIDE 50 MG: 50 TABLET ORAL at 08:10

## 2021-07-04 RX ADMIN — SODIUM CHLORIDE: 9 INJECTION, SOLUTION INTRAVENOUS at 05:44

## 2021-07-04 ASSESSMENT — PAIN SCALES - GENERAL
PAINLEVEL_OUTOF10: 0

## 2021-07-04 NOTE — PROGRESS NOTES
Hospitalist Progress Note      PCP: Manod Chin MD    Date of Admission: 7/3/2021    Chief Complaint: Chest pain    Hospital Course: Patient is 43-year-old male with history of morbid obesity, diabetes mellitus, hypertension, CHARLIE on CPAP came into ER with a complaint of upper back pain radiating towards the chest.  CT PE negative for PE or pulmonary edema. Admitted for NSTEMI  [facility] 7/3/2021: Diagonal 100% s/p DARLINE, % status post DARLINE, distal RCA 90% status post DARLINE, LVEDP 25 mmHg. Subjective: Seen and examined today denies any chest pain, nausea, vomiting, shortness of breath. No acute event reported overnight. Medications:  Reviewed    Infusion Medications    dextrose      sodium chloride       Scheduled Medications    nitroglycerin  1 inch Topical 4 times per day    metoprolol tartrate  50 mg Oral BID    sertraline  50 mg Oral Daily    insulin lispro  0-6 Units Subcutaneous TID WC    insulin lispro  0-3 Units Subcutaneous Nightly    sodium chloride flush  5-40 mL Intravenous 2 times per day    atorvastatin  40 mg Oral Nightly    ticagrelor  90 mg Oral BID    losartan  25 mg Oral Daily    aspirin  81 mg Oral Daily     PRN Meds: perflutren lipid microspheres, nitroGLYCERIN, ondansetron, acetaminophen **OR** acetaminophen, polyethylene glycol, perflutren lipid microspheres, morphine, glucose, dextrose, glucagon (rDNA), dextrose, sodium chloride flush, sodium chloride, acetaminophen, LORazepam      Intake/Output Summary (Last 24 hours) at 7/4/2021 1216  Last data filed at 7/4/2021 0300  Gross per 24 hour   Intake --   Output 700 ml   Net -700 ml       Physical Exam Performed:    /70   Pulse 78   Temp 98.2 °F (36.8 °C) (Oral)   Resp 18   Ht 5' 7\" (1.702 m)   Wt (!) 400 lb 5.7 oz (181.6 kg)   SpO2 96%   BMI 62.70 kg/m²     General appearance: No apparent distress, appears stated age and cooperative. HEENT: Pupils equal, round, and reactive to light.  Conjunctivae/corneas clear.  Neck: Supple, with full range of motion. No jugular venous distention. Trachea midline. Respiratory:  Normal respiratory effort. Clear to auscultation, bilaterally without Rales/Wheezes/Rhonchi. Cardiovascular: Regular rate and rhythm with normal S1/S2 without murmurs, rubs or gallops. Abdomen: Soft, non-tender, non-distended with normal bowel sounds. Musculoskeletal: 1+ bilateral lower extremity edema  Skin: Skin color, texture, turgor normal.  No rashes or lesions. Neurologic:  Neurovascularly intact without any focal sensory/motor deficits. Cranial nerves: II-XII intact, grossly non-focal.  Psychiatric: Alert and oriented, thought content appropriate, normal insight  Capillary Refill: Brisk,< 3 seconds   Peripheral Pulses: +2 palpable, equal bilaterally       Labs:   Recent Labs     07/03/21  1022 07/04/21  0642   WBC 9.8 12.8*   HGB 13.2* 11.8*   HCT 40.4* 36.6*    245     Recent Labs     07/03/21  1022 07/04/21  0642    133*   K 3.7 3.6   CL 98* 98*   CO2 28 24   BUN 17 16   CREATININE 1.0 1.2   CALCIUM 10.0 9.0     Recent Labs     07/03/21  1022   AST 34   ALT 44*   BILITOT <0.2   ALKPHOS 115     Recent Labs     07/03/21  1022   INR 0.94     Recent Labs     07/03/21  1321 07/03/21  1453 07/03/21  1936   TROPONINI 0.22* 0.27* 1.46*       Urinalysis:      Lab Results   Component Value Date    NITRU Negative 03/10/2016    WBCUA 0 03/10/2016    RBCUA 3 03/10/2016    BLOODU Negative 03/10/2016    SPECGRAV 1.020 03/10/2016    GLUCOSEU Negative 03/10/2016       Radiology:  CT CHEST PULMONARY EMBOLISM W CONTRAST   Final Result   1. No evidence for pulmonary embolism   2. Nonspecific mild lymphadenopathy bilateral axillary regions   3. Moderate diffuse fatty change of liver. Recommend correlation with liver function tests for significance.       XR CHEST (2 VW)   Final Result   Mild cardia megaly              Assessment/Plan:    Active Hospital Problems    Diagnosis Date Noted    NSTEMI (non-ST elevated myocardial infarction) ([facility] [address] [I21.4] 07/03/2021     #NSTEMI  [facility] 7/3/2021: Diagonal 100% s/p DARLINE, % status post DARLINE, distal RCA 90% status post DARLINE, LVEDP 25 mmHg. Cardiology number recommend to continue aspirin, Brilinta, Lopressor, Lipitor. Follow-up on echocardiogram.    #Hypertension stable  Continue amlodipine, Lopressor and losartan. #Elevated creatinine post procedure  We will monitor. Did receive fluids post-cath. #Diabetes mellitus type 2  Hemoglobin A1c 6.6  Conventional sliding scale. Patient is on Metformin at home. #Obesity (Body mass index is 62.7 kg/m². ) - Complicating assessment and treatment. Placing patient at risk for multiple co-morbidities as well as early death and contributing to the patient's presentation. Counseled on weight loss. #CHARLIE on CPAP    DVT Prophylaxis: lovenox  Diet: ADULT DIET; Regular; Low Fat/Low Chol/High Fiber/ELVIE  Code Status: Full Code    PT/OT Eval Status: N/A    Dispo - inpatient.  Echo, likely d/c in am    Daiana Coy MD

## 2021-07-04 NOTE — PROGRESS NOTES
Cardiology Consult Service  Daily Progress Note        Admit Date:  7/3/2021  Primary cardiologist: Dr Beka Kaplan / Dr Mira Garcia    Reason for Consultation/Chief Complaint: NSTEMI    Subjective:      Trina Oliveira is a 40 y.o. male with a past medical history of morbid obesity (BMI 59), borderline DM, HTN, borderline HLP, past smoker (quit 6 to 7 years ago).     Patient presented to the emergency room today with sudden onset of upper back pain (between scapula) radiating to his anterior chest as of 5 PM yesterday evening. Pain was nonexertional, nonpleuritic, 8 out of 10 in intensity. At the emergency room he was hypertensive (177/94 mmHg) otherwise hemodynamically stable. Labs within normal limits except for troponin 0.16 on presentation, up to 0.223 hours later. ECG was within normal limits, CT chest negative for PE or pulmonary edema, chest x-ray unremarkable.     Patient has no previous cardiac history. He denies any recent history of exertional anginal symptoms including chest pain, shortness of breath, dizziness. He admits to a family history of premature CAD (mother with stents starting in her 45s, non-smoker). Patient was admitted for NSTEMI and cardiology was consulted for further evaluation. He was given Lipitor 80 mg p.o. x1, Lopressor 25x1, Nitrostat x1 and nitroglycerin patch, full dose aspirin, Heparin bolus and then drip. His chest pain has now improved but not completely resolved, down at 2-3 out of 10 in intensity. [facility] 7/3/2021: Diagonal 100% s/p DARLINE, % status post DARLINE, distal RCA 90% status post DARLINE, LVEDP 25 mmHg. Interval history:  Patient today reports significantly improved, has no more chest pains, denies dyspnea. Peak troponin 1.4 prior to [facility]. Creatinine today 1.2 up from 1.0, hemoglobin 11.8 from 13 on admission. Patient is currently on normal saline at 100 mL/h since 6 AM today.     Objective:     Medications:   nitroglycerin  1 inch Topical 4 times per day    metoprolol tartrate  50 mg Oral BID    sertraline  50 mg Oral Daily    insulin lispro  0-6 Units Subcutaneous TID     insulin lispro  0-3 Units Subcutaneous Nightly    sodium chloride flush  5-40 mL Intravenous 2 times per day    atorvastatin  40 mg Oral Nightly    ticagrelor  90 mg Oral BID    losartan  25 mg Oral Daily    aspirin  81 mg Oral Daily       IV drips:   dextrose      sodium chloride         PRN:  perflutren lipid microspheres, nitroGLYCERIN, ondansetron, acetaminophen **OR** acetaminophen, polyethylene glycol, perflutren lipid microspheres, morphine, glucose, dextrose, glucagon (rDNA), dextrose, sodium chloride flush, sodium chloride, acetaminophen, LORazepam    Vitals:    07/04/21 0431 07/04/21 0600 07/04/21 0808 07/04/21 1128   BP:   (!) 165/80 121/70   Pulse:   76 78   Resp: 18  18 18   Temp:   98 °F (36.7 °C) 98.2 °F (36.8 °C)   TempSrc:   Oral Oral   SpO2: 95%  95% 96%   Weight:  (!) 400 lb 5.7 oz (181.6 kg)     Height:           Intake/Output Summary (Last 24 hours) at 7/4/2021 1144  Last data filed at 7/4/2021 0300  Gross per 24 hour   Intake --   Output 700 ml   Net -700 ml     I/O last 3 completed shifts:  In: -   Out: 700 [Urine:700]  Wt Readings from Last 3 Encounters:   07/04/21 (!) 400 lb 5.7 oz (181.6 kg)   06/08/21 (!) 404 lb (183.3 kg)   05/21/21 (!) 407 lb (184.6 kg)       Admit Wt: Weight: (!) 412 lb (186.9 kg)   Todays Wt: Weight: (!) 400 lb 5.7 oz (181.6 kg)    TELEMETRY: Sinus     Physical Exam:         General Appearance:  Alert, cooperative, no distress, appears stated age Appropriate weight   Head:  Normocephalic, without obvious abnormality, atraumatic   Eyes:  PERRL, conjunctiva/corneas clear EOM intact  Ears normal   Throat no lesions       Nose: Nares normal, no drainage or sinus tenderness   Throat: Lips, mucosa, and tongue normal   Neck: Supple, symmetrical, trachea midline, no adenopathy, thyroid: not enlarged, symmetric, no tenderness/mass/nodules, no carotid bruit.        Lungs:   Normal respiratory rate, lungs clear to auscultation without any wheezes, rubs or ronchi bilaterally. Chest Wall:  No tenderness or deformity   Heart:  Regular rhythm, rate is controlled, S1, S2 normal, there is no murmur, there is no rub or gallop, no jvd, 1+ bilateral lower extremity edema   Abdomen:   Soft, non-tender, bowel sounds active all four quadrants,  no masses, no organomegaly       Extremities: Extremities normal, atraumatic, no cyanosis. Right extremity is warm with decent pulse, right groin shows no evidence of ecchymosis or hematoma at the cath site. Pulses: 2+ and symmetric   Skin: Skin color, texture, turgor normal, no rashes or lesions   Pysch: Normal mood and affect   Neurologic: Normal gross motor and sensory exam.  Cranial nerves intact       Labs:   Recent Labs     07/03/21  1022 07/04/21  0642    133*   K 3.7 3.6   BUN 17 16   CREATININE 1.0 1.2   CL 98* 98*   CO2 28 24   GLUCOSE 145* 134*   CALCIUM 10.0 9.0     Recent Labs     07/03/21  1022 07/03/21  1022 07/04/21  0642   WBC 9.8  --  12.8*   HGB 13.2*  --  11.8*   HCT 40.4*  --  36.6*     --  245   MCV 82.3   < > 82.2    < > = values in this interval not displayed. No results for input(s): CHOLTOT, TRIG, HDL in the last 72 hours. Invalid input(s): LIPIDCOMM, CHOLHDL, VLDCHOL, LDL  Recent Labs     07/03/21  1022   INR 0.94     Recent Labs     07/03/21  1022 07/03/21  1321 07/03/21  1453 07/03/21  1936   TROPONINI 0.16* 0.22* 0.27* 1.46*     No results for input(s): BNP in the last 72 hours. No results for input(s): NTPROBNP in the last 72 hours. No results for input(s): TSH in the last 72 hours. Imaging:       Assessment & Plan:     1. NSTEMI:  I suspect patient's symptoms are due to ischemia. ECG is nonischemic, troponin borderline elevated but increasing at 0.22. Patient had LHC with PCI 3 DARLINE.      -Cw aspirin 81 mg daily, Brilinta 90 bid, Lopressor 50 twice daily, Lipitor 40 daily  -We will obtain an echo     2. Essential hypertension:  Patient presented with severe hypertension and tachycardic most likely due to severe chest pain rather than uncontrolled hypertension. BP is now controlled.     -Continue amlodipine 5 daily, Lopressor 50 twice daily, losartan 25 daily     3. Hyperlipidemia:  Recent lipid profile was reviewed, LDL 80s, off statin.     -Agree with Lipitor 40 daily. 4. JOYCE:  Patient with mild elevation of creatinine post cath. LVEDP elevated at [facility]. There is mild lower extremity edema on exam today.    -We will stop normal saline now (patient received about 500 mL so far)  -I encouraged patient to increase his intake of water today. I have spent 35 minutes of face to face time with the patient with more than 50% spent counseling and coordinating care. I have personally reviewed the reports and images of labs, radiological studies, cardiac studies including ECG's and telemetry, current and old medical records. The note was completed using EMR and Dragon dictation system. Every effort was made to ensure accuracy; however, inadvertent computerized transcription errors may be present. All questions and concerns were addressed to the patient/family. Alternatives to my treatment were discussed. Thank you for allowing to us to participate in the care or Vicky Muse. Please call our service with questions.     Doreen Gilbert MD, [address], [address]  The [address]  [facility] [address] [address]  Ph: [phone]  Fax: [phone]

## 2021-07-04 NOTE — PROCEDURES
[facility] [address], [address]                            CARDIAC CATHETERIZATION    PATIENT NAME: Claudette Otter                :        1975  MED REC NO:   [id number]                          ROOM:       [id number]  ACCOUNT NO:   [de-identified]                           ADMIT DATE: 2021  PROVIDER:     Siobhan Gonzalez. Camelia Atkins MD    DATE OF PROCEDURE:  2021    EMERGENCY CARDIAC CATHETERIZATION PROCEDURE NOTE    INDICATION FOR PROCEDURE:  Severe unrelenting chest pain. The patient  presented with a non-ST-elevation MI. His troponin went from 0.16 to  0.22. He had been treated with multiple medicines and was still having  3 to 5 to 7 out of 10 chest pain at times. Because of his unstable  features, he was brought emergently to the cardiac catheterization  laboratory. The EKG was not pathognomonic of ST-elevation MI. He had  diffuse nonspecific ST depressions actually. DESCRIPTION OF PROCEDURE:  After informed consent was obtained, the  patient was prepped and draped in sterile manner. He was locally  anesthetized with 1% lidocaine. A 5-Vincentian sheath was placed in a  retrograde manner over a guidewire into the right femoral artery, was  aspirated and flushed. The start time of the procedure by the way was  1635. The end time was 1723. The patient received a total of 2 mg of  Versed and 100 mcg of fentanyl IV push in divided dosages. He was  monitored by myself and the entire cardiac cath lab team from a  cardiopulmonary status. His respiratory status was unencumbered the  entire procedure. Oxygen saturations were very strong the entire  procedure. After the sheath was placed, JL4 catheter injection of the left main  coronary artery shows normal left main coronary artery. The circumflex  comes off the left main as expected. In the distal obtuse marginal  branch that is rather large, he has a 90% stenosis. The LAD was  unremarkable. The first high diagonal branch was occluded. Second  diagonal branch was unremarkable. The LAD was unremarkable. There was  AIDA grade 3 flow in the LAD. There was AIDA grade 0 flow in the first  high diagonal branch. JR4 catheter injection of the right coronary artery showed a dominant  right coronary artery with a high bifurcation. There was an RV branch  that is a smaller artery at mid vessel that has ostial 80% stenosis. There was 30% stenosis within the main body of the right coronary artery  at that juncture. I did not think that that was significant; however,  in the horizontal limb of the distal right coronary artery, there was a  focal 90% stenosis. There was AIDA grade 3 flow. There was a small  superior branch coming off in the Malag[place] view that had moderate stenosis of  the ostium, but it was small in caliber. I elected to proceed with the initial culprit which was cleared to be  the diagonal branch since it was occluded. An XB 3.5 guide was advanced  into the ascending aorta and used to engage the left main coronary  artery. The ACT was 152 seconds. I gave this patient a total of 13,000  units of heparin in divided doses. I started with 8000, gave 2000, and  then another 3000 units. This was over the course of the procedure and  then, we gave Integrilin toward the end of the procedure as well. Once  the heparin was in, I took a BMW guidewire and it easily passed beyond  the occlusion. I was able to reestablish flow with the wire. I  primarily stented it with a 2.5 mm x 15 mm [place] Resolute drug-eluting  stent and it was deployed at 12 atmospheres x30 seconds. There was  AIDA-3 blood flow with 0% residual stenosis. The patient's chest pain  was gone at that juncture. However, because he had significant lesions in the distal OM branch  which was rather large and the distal right coronary artery, I felt that  those needed to be treated as well.   Via the XB guide, I used the same  BMW guidewire and advanced it into the second obtuse marginal branch. I  primarily stented that lesion, it was the OM #3, I put a 3.0 x 12 mm  [place] Resolute drug-eluting stent on that 90% lesion and reduced it to 0%  residual stenosis. My intervention was concluded in the left coronary  circulation. Next, I turned to the right coronary artery with a JR4 6-Macanese guide. A new BMW guidewire was advanced beyond the 90% focal stenosis in the  distal right coronary artery. This was a dominant right coronary  artery. I placed a 3.5 mm x 12 mm Resolute [place] drug-eluting stent  across that lesion and the 90% focal stenosis was reduced to 0% residual  stenosis with AIDA-3 blood flow. The small superior branch that was  jailed there is unencumbered with AIDA-3 blood flow. I placed a JR4 guide in the left ventricle. The LVEDP was 25 mmHg. There was heavy respiratory variation. Injection by hand in the BRAVO  shows an LV ejection fraction of 60% without gross wall motion  abnormality. There was no gradient across the aortic valve upon  pullback. The interventional procedure was concluded at that juncture. Injection of the femoral sheath showed that it was located squarely in  the right common femoral artery thankfully and a 6-Macanese Angio-Seal  Evolution device was deployed and hemostasis was achieved. A final ACT  was 277 seconds and Integrilin single bolus and drip was started. CONCLUSION:  1. Successful primary stent of the diagonal branch occlusion with 100%,  reduced to 0% residual stenosis. 2.  Successful primary stent of the obtuse marginal branch #3 with a 90%  stenosis reduced to 0 with a 3.0 x 12 mm Jhonatan drug-eluting stent. 3.  Distal RCA was stented primarily with a 3.5 mm x 12 Resolute [place]  drug-eluting stent. 4. LAD is normal 30% in the mid right coronary artery stenosis. LVEF  60%. 5.  LVEDP 25 mmHg.   6.  Successful Angio-Seal right femoral arteriotomy. PLAN:  Hydration. Bedrest.  Risk factor modification. The patient may  have sleep apnea with his body habitus which would be helpful to be  treated if it is not at this juncture. Please note that the estimated blood loss was less than 30 mL. The Mallampati and the ASA scores were assessed prior to the start of  the cardiac catheterization procedure and the ASA score was 2 and the  Mallampati score was 2. The patient was returned to [address] [place] [address] in good condition and chest  pain-free with stable vital signs. Isai Walters MD    D: 07/03/2021 17:40:54       T: 07/03/2021 20:06:44     [email or web address]  Job#: [id number]     Doc#: [id number]    CC:  Chelsie Chakraborty.  MD Ryan Quintanilla Md

## 2021-07-04 NOTE — PLAN OF CARE
Problem: Pain:  Goal: Control of acute pain  Description: Control of acute pain  Outcome: Met This Shift   No pain through shift. Problem: Cardiac:  Goal: Ability to maintain vital signs within normal range will improve  Description: Ability to maintain vital signs within normal range will improve  Outcome: Met This Shift     Problem: Cardiac:  Goal: Cardiovascular alteration will improve  Description: Cardiovascular alteration will improve  Outcome: Met This Shift   Pt no longer having any chest pain. Pt comfortable.

## 2021-07-04 NOTE — PLAN OF CARE
Problem: Pain:  Goal: Control of acute pain  Description: Control of acute pain  Outcome: Ongoing  Note: Prn available, pt denies pain at this time     Problem: Cardiac:  Goal: Ability to maintain vital signs within normal range will improve  Description: Ability to maintain vital signs within normal range will improve  Outcome: Ongoing  Note: /79   Pulse 82   Temp 97.8 °F (36.6 °C) (Oral)   Resp 16   Ht 5' 7\" (1.702 m)   Wt (!) 412 lb (186.9 kg)   SpO2 95%   BMI 64.53 kg/m²     Goal: Cardiovascular alteration will improve  Description: Cardiovascular alteration will improve  Outcome: Ongoing  Note: Post angio with 3 stents

## 2021-07-04 NOTE — PROGRESS NOTES
BMP:   Recent Labs     07/03/21  1022 07/04/21  0642    133*   K 3.7 3.6   CL 98* 98*   CO2 28 24   BUN 17 16   CREATININE 1.0 1.2     LIVER PROFILE:   Recent Labs     07/03/21  1022   AST 34   ALT 44*   LIPASE 22.0   BILITOT <0.2   ALKPHOS 115     PT/INR:   Recent Labs     07/03/21  1022   PROTIME 10.6   INR 0.94     APTT:   Recent Labs     07/03/21  1022   APTT 37.1         Assessment:  Patient Active Problem List    Diagnosis Date Noted    NSTEMI (non-ST elevated myocardial infarction) ([facility] 07/03/2021    Type 2 diabetes mellitus without complication, without long-term current use of insulin ([facility] 05/21/2021    Depression with anxiety 05/21/2021    Chronic gout involving toe without tophus 05/21/2021    Hypertension, essential     Pre-diabetes 06/01/2017    Severe single current episode of major depressive disorder, without psychotic features [facility] 11/12/2016    Alcohol abuse 11/12/2016    Tobacco abuse 11/12/2016    Recurrent incisional hernia 11/03/2016    Obstructive sleep apnea     Recurrent ventral hernia 05/10/2016    Recurrent ventral incisional hernia 05/03/2016    Non-intractable vomiting with nausea 10/12/2015    H/O Clostridium difficile infection     Hyperlipemia 05/17/2013    Microalbuminuria 05/17/2013    Morbid obesity with BMI of 50.0-59.9, adult ([facility] 05/17/2013    HTN (hypertension) 01/03/2013       Plan:  1. CAD:  Doing well after Successful primary stent of the diagonal branch occlusion with 100%, reduced to 0% residual stenosis. Successful primary stent of the obtuse marginal branch #3 with a 90% stenosis reduced to 0 with a 3.0 x 12 mm Jhonatan drug-eluting stent. Distal RCA was stented primarily with a 3.5 mm x 12 Resolute Onyxdrug-eluting stent. LDL 67 but continue statin. Stop paste and go to Imdur 30 mg daily for minimal spasm in small diagonal after stenting of that proximally occluded vessel.     2.  Renal function:  Creat 1.0 to 1.2:  Doubt true JOYCE but hydrate today and likely discharge tomorrow with cor measure medications. Continue brillinta 90 mg po bid and asa 81 mg daily. Losartan started as well. 3.  Assess site:  Clean and dry with no hematoma after angioseal.  Advance activity.     Core Measures:  · Discharge instructions:   · LVEF documented:   · ACEI for LV dysfunction:   · Smoking Cessation:    Purvi Durant MD, MD 7/4/2021 1:53 PM

## 2021-07-05 VITALS
OXYGEN SATURATION: 96 % | TEMPERATURE: 97.8 F | SYSTOLIC BLOOD PRESSURE: 159 MMHG | HEIGHT: 67 IN | HEART RATE: 82 BPM | RESPIRATION RATE: 20 BRPM | DIASTOLIC BLOOD PRESSURE: 81 MMHG | BODY MASS INDEX: 49.44 KG/M2 | WEIGHT: 315 LBS

## 2021-07-05 LAB
ANION GAP SERPL CALCULATED.3IONS-SCNC: 9 MMOL/L (ref 3–16)
BUN BLDV-MCNC: 18 MG/DL (ref 7–20)
CALCIUM SERPL-MCNC: 9.1 MG/DL (ref 8.3–10.6)
CHLORIDE BLD-SCNC: 98 MMOL/L (ref 99–110)
CO2: 27 MMOL/L (ref 21–32)
CREAT SERPL-MCNC: 1.1 MG/DL (ref 0.9–1.3)
GFR AFRICAN AMERICAN: >60
GFR NON-AFRICAN AMERICAN: >60
GLUCOSE BLD-MCNC: 129 MG/DL (ref 70–99)
GLUCOSE BLD-MCNC: 130 MG/DL (ref 70–99)
HCT VFR BLD CALC: 33 % (ref 40.5–52.5)
HEMOGLOBIN: 11 G/DL (ref 13.5–17.5)
MAGNESIUM: 2.1 MG/DL (ref 1.8–2.4)
MCH RBC QN AUTO: 27 PG (ref 26–34)
MCHC RBC AUTO-ENTMCNC: 33.2 G/DL (ref 31–36)
MCV RBC AUTO: 81.4 FL (ref 80–100)
PDW BLD-RTO: 16.8 % (ref 12.4–15.4)
PERFORMED ON: ABNORMAL
PLATELET # BLD: 223 K/UL (ref 135–450)
PMV BLD AUTO: 7.4 FL (ref 5–10.5)
POTASSIUM REFLEX MAGNESIUM: 3.5 MMOL/L (ref 3.5–5.1)
RBC # BLD: 4.06 M/UL (ref 4.2–5.9)
SODIUM BLD-SCNC: 134 MMOL/L (ref 136–145)
WBC # BLD: 11 K/UL (ref 4–11)

## 2021-07-05 PROCEDURE — 6370000000 HC RX 637 (ALT 250 FOR IP): Performed by: INTERNAL MEDICINE

## 2021-07-05 PROCEDURE — 99233 SBSQ HOSP IP/OBS HIGH 50: CPT | Performed by: INTERNAL MEDICINE

## 2021-07-05 PROCEDURE — 85027 COMPLETE CBC AUTOMATED: CPT

## 2021-07-05 PROCEDURE — 6360000002 HC RX W HCPCS: Performed by: INTERNAL MEDICINE

## 2021-07-05 PROCEDURE — 80048 BASIC METABOLIC PNL TOTAL CA: CPT

## 2021-07-05 PROCEDURE — 83735 ASSAY OF MAGNESIUM: CPT

## 2021-07-05 PROCEDURE — 2580000003 HC RX 258: Performed by: INTERNAL MEDICINE

## 2021-07-05 PROCEDURE — 36415 COLL VENOUS BLD VENIPUNCTURE: CPT

## 2021-07-05 RX ORDER — METOPROLOL TARTRATE 50 MG/1
50 TABLET, FILM COATED ORAL 2 TIMES DAILY
Qty: 60 TABLET | Refills: 1 | Status: SHIPPED | OUTPATIENT
Start: 2021-07-05 | End: 2021-09-02 | Stop reason: SDUPTHER

## 2021-07-05 RX ORDER — LOSARTAN POTASSIUM 25 MG/1
25 TABLET ORAL DAILY
Qty: 30 TABLET | Refills: 1 | Status: SHIPPED | OUTPATIENT
Start: 2021-07-06 | End: 2021-08-27 | Stop reason: SDUPTHER

## 2021-07-05 RX ORDER — NITROGLYCERIN 0.4 MG/1
TABLET SUBLINGUAL
Qty: 25 TABLET | Refills: 3 | Status: SHIPPED | OUTPATIENT
Start: 2021-07-05

## 2021-07-05 RX ORDER — ASPIRIN 81 MG/1
81 TABLET ORAL DAILY
Qty: 30 TABLET | Refills: 1 | Status: SHIPPED | OUTPATIENT
Start: 2021-07-06 | End: 2021-08-27 | Stop reason: SDUPTHER

## 2021-07-05 RX ORDER — ATORVASTATIN CALCIUM 40 MG/1
40 TABLET, FILM COATED ORAL NIGHTLY
Qty: 30 TABLET | Refills: 1 | Status: SHIPPED | OUTPATIENT
Start: 2021-07-05 | End: 2021-08-31 | Stop reason: SDUPTHER

## 2021-07-05 RX ADMIN — TICAGRELOR 90 MG: 90 TABLET ORAL at 08:01

## 2021-07-05 RX ADMIN — ASPIRIN 81 MG: 81 TABLET, COATED ORAL at 08:02

## 2021-07-05 RX ADMIN — LOSARTAN POTASSIUM 25 MG: 25 TABLET, FILM COATED ORAL at 08:02

## 2021-07-05 RX ADMIN — ISOSORBIDE MONONITRATE 30 MG: 30 TABLET, EXTENDED RELEASE ORAL at 08:02

## 2021-07-05 RX ADMIN — METOPROLOL TARTRATE 50 MG: 50 TABLET, FILM COATED ORAL at 08:01

## 2021-07-05 RX ADMIN — ENOXAPARIN SODIUM 40 MG: 40 INJECTION SUBCUTANEOUS at 08:02

## 2021-07-05 RX ADMIN — SERTRALINE HYDROCHLORIDE 50 MG: 50 TABLET ORAL at 08:01

## 2021-07-05 RX ADMIN — Medication 10 ML: at 08:04

## 2021-07-05 ASSESSMENT — PAIN SCALES - GENERAL
PAINLEVEL_OUTOF10: 0
PAINLEVEL_OUTOF10: 0

## 2021-07-05 NOTE — PROGRESS NOTES
Cardiology Consult Service  Daily Progress Note        Admit Date:  7/3/2021  Primary cardiologist: Dr Vicky Weeks / Dr Mary Trimble    Reason for Consultation/Chief Complaint: NSTEMI    Subjective:      Jennifer Banks is a 40 y.o. male with a past medical history of morbid obesity (BMI 59), borderline DM, HTN, borderline HLP, past smoker (quit 6 to 7 years ago).     Patient presented to the emergency room today with sudden onset of upper back pain (between scapula) radiating to his anterior chest as of 5 PM yesterday evening. Pain was nonexertional, nonpleuritic, 8 out of 10 in intensity. At the emergency room he was hypertensive (177/94 mmHg) otherwise hemodynamically stable. Labs within normal limits except for troponin 0.16 on presentation, up to 0.223 hours later. ECG was within normal limits, CT chest negative for PE or pulmonary edema, chest x-ray unremarkable.     Patient has no previous cardiac history. He denies any recent history of exertional anginal symptoms including chest pain, shortness of breath, dizziness. He admits to a family history of premature CAD (mother with stents starting in her 45s, non-smoker). Patient was admitted for NSTEMI and cardiology was consulted for further evaluation. He was given Lipitor 80 mg p.o. x1, Lopressor 25x1, Nitrostat x1 and nitroglycerin patch, full dose aspirin, Heparin bolus and then drip. His chest pain has now improved but not completely resolved, down at 2-3 out of 10 in intensity. [facility] 7/3/2021: Diagonal 100% s/p DARLINE, % status post DARLINE, distal RCA 90% status post DARLINE, LVEDP 25 mmHg. Interval history:  No overnight events.   Creatinine now improving at 1.1 from 1.2.  BP low normal.    Objective:     Medications:   enoxaparin  40 mg Subcutaneous Daily    metoprolol tartrate  50 mg Oral BID    sertraline  50 mg Oral Daily    insulin lispro  0-6 Units Subcutaneous TID WC    insulin lispro  0-3 Units Subcutaneous Nightly    sodium chloride flush 5-40 mL Intravenous 2 times per day    atorvastatin  40 mg Oral Nightly    ticagrelor  90 mg Oral BID    losartan  25 mg Oral Daily    aspirin  81 mg Oral Daily       IV drips:   dextrose      sodium chloride         PRN:  perflutren lipid microspheres, nitroGLYCERIN, ondansetron, acetaminophen **OR** acetaminophen, polyethylene glycol, perflutren lipid microspheres, morphine, glucose, dextrose, glucagon (rDNA), dextrose, sodium chloride flush, sodium chloride, acetaminophen, LORazepam    Vitals:    07/05/21 0200 07/05/21 0400 07/05/21 0532 07/05/21 0758   BP:   109/70 (!) 159/81   Pulse: 81 79 85 82   Resp:   16 20   Temp:   98.1 °F (36.7 °C) 97.8 °F (36.6 °C)   TempSrc:   Oral Oral   SpO2:   95% 96%   Weight:   (!) 400 lb 12.7 oz (181.8 kg)    Height:           Intake/Output Summary (Last 24 hours) at 7/5/2021 1019  Last data filed at 7/5/2021 0945  Gross per 24 hour   Intake 1380 ml   Output 1000 ml   Net 380 ml     I/O last 3 completed shifts: In: 1140 [P.O.:1140]  Out: 1000 [Urine:1000]  Wt Readings from Last 3 Encounters:   07/05/21 (!) 400 lb 12.7 oz (181.8 kg)   06/08/21 (!) 404 lb (183.3 kg)   05/21/21 (!) 407 lb (184.6 kg)       Admit Wt: Weight: (!) 412 lb (186.9 kg)   Todays Wt: Weight: (!) 400 lb 12.7 oz (181.8 kg)    TELEMETRY: Sinus     Physical Exam:         General Appearance:  Alert, cooperative, no distress, appears stated age Appropriate weight   Head:  Normocephalic, without obvious abnormality, atraumatic   Eyes:  PERRL, conjunctiva/corneas clear EOM intact  Ears normal   Throat no lesions       Nose: Nares normal, no drainage or sinus tenderness   Throat: Lips, mucosa, and tongue normal   Neck: Supple, symmetrical, trachea midline, no adenopathy, thyroid: not enlarged, symmetric, no tenderness/mass/nodules, no carotid bruit. Lungs:   Normal respiratory rate, lungs clear to auscultation without any wheezes, rubs or ronchi bilaterally.     Chest Wall:  No tenderness or deformity Heart:  Regular rhythm, rate is controlled, S1, S2 normal, there is no murmur, there is no rub or gallop, no jvd, 1+ bilateral lower extremity edema   Abdomen:   Soft, non-tender, bowel sounds active all four quadrants,  no masses, no organomegaly       Extremities: Extremities normal, atraumatic, no cyanosis. Right extremity is warm with decent pulse, right groin shows no evidence of ecchymosis or hematoma at the cath site. Pulses: 2+ and symmetric   Skin: Skin color, texture, turgor normal, no rashes or lesions   Pysch: Normal mood and affect   Neurologic: Normal gross motor and sensory exam.  Cranial nerves intact       Labs:   Recent Labs     07/03/21  1022 07/04/21  0642 07/05/21  0521    133* 134*   K 3.7 3.6 3.5   BUN 17 16 18   CREATININE 1.0 1.2 1.1   CL 98* 98* 98*   CO2 28 24 27   GLUCOSE 145* 134* 130*   CALCIUM 10.0 9.0 9.1   MG  --   --  2.10     Recent Labs     07/03/21  1022 07/03/21  1022 07/04/21  0642 07/04/21  0642 07/05/21  0521   WBC 9.8  --  12.8*  --  11.0   HGB 13.2*  --  11.8*  --  11.0*   HCT 40.4*  --  36.6*  --  33.0*     --  245  --  223   MCV 82.3   < > 82.2   < > 81.4    < > = values in this interval not displayed. Recent Labs     07/04/21  0642   TRIG 144   HDL 34*     Recent Labs     07/03/21  1022   INR 0.94     Recent Labs     07/03/21  1022 07/03/21  1321 07/03/21  1453 07/03/21  1936   TROPONINI 0.16* 0.22* 0.27* 1.46*     No results for input(s): BNP in the last 72 hours. No results for input(s): NTPROBNP in the last 72 hours. No results for input(s): TSH in the last 72 hours. Imaging:       Assessment & Plan:     1. NSTEMI:  I suspect patient's symptoms are due to ischemia. ECG is nonischemic, troponin borderline elevated but increasing at 0.22. Patient had LHC with PCI 3 DARLINE.      -Cw aspirin 81 mg daily, Brilinta 90 bid, Lopressor 50 twice daily, Lipitor 40 daily  -We will obtain an echo as outpatient     2.  Essential hypertension:  Patient presented with severe hypertension and tachycardic most likely due to severe chest pain rather than uncontrolled hypertension. BP is now low normal.     -Continue Lopressor 50 twice daily  -We will stop amlodipine and Imdur     3. Hyperlipidemia:  Recent lipid profile was reviewed, LDL 80s, off statin.     -Agree with Lipitor 40 daily. 4. JOYCE:  Patient with mild elevation of creatinine post cath. LVEDP elevated at [facility]. There is mild lower extremity edema on exam today. IVF post-cath were stopped 7/4    -I encouraged patient to increase his intake of water today. Patient may be discharged home today on the above meds and follow up with me in 1 week with a BMP prior to visit. Please call me with any questions. I have spent 35 minutes of face to face time with the patient with more than 50% spent counseling and coordinating care. I have personally reviewed the reports and images of labs, radiological studies, cardiac studies including ECG's and telemetry, current and old medical records. The note was completed using EMR and Dragon dictation system. Every effort was made to ensure accuracy; however, inadvertent computerized transcription errors may be present. All questions and concerns were addressed to the patient/family. Alternatives to my treatment were discussed. Thank you for allowing to us to participate in the care or [employer] [place]. Please call our service with questions.     Leon Coronado MD, [facility], [address]  The [address]  [address] [address] [place] [address]  Ph: [phone]  Fax: [phone]

## 2021-07-05 NOTE — CARE COORDINATION
Case Management Assessment            Discharge Note                    Date / Time of Note: 7/5/2021 9:55 AM                  Discharge Note Completed by: Lopez Mejia RN    Patient Name: Mia Monroe   YOB: 1975  Diagnosis: NSTEMI (non-ST elevated myocardial infarction) [facility]) [I21.4]   Date / Time: 7/3/2021 10:10 AM    Current PCP: Sivakumar Mcgee MD  Clinic patient: No    Hospitalization in the last 30 days: No    Advance Directives:  Code Status: Full Code  [place] DNR form completed and on chart: Not Indicated    Financial:  Payor: Danuta Shows / Plan: BCBS - [place] PPO / Product Type: *No Product type* /      Pharmacy:    [facility] [address] [place] [address], [place] [address] [phone] [place] [phone] [address]  [address]  Phone: [phone] Fax: [phone] [address] [facility], [address] [phone] - f [phone]  [address] [place] [address]  Phone: [phone] Fax: [phone]      Assistance purchasing medications?: Potential Assistance Purchasing Medications: Yes  Assistance provided by Case Management: None at this time    Does patient want to participate in local refill/ meds to beds program?: Yes    Meds To Beds General Rules:  1. Can ONLY be done Monday- Friday between 8:30am-5pm  2. Prescription(s) must be in pharmacy by 3pm to be filled same day  3. Copy of patient's insurance/ prescription drug card and patient face sheet must be sent along with the prescription(s)  4. Cost of Rx cannot be added to hospital bill. If financial assistance is needed, please contact unit  or ;  or  CANNOT provide pharmacy voucher for patients co-pays  5.  Patients can then  the prescription on their way out of the hospital at discharge, or pharmacy can deliver to the bedside if staff is available. (payment due at time of pick-up or delivery - cash, check, or card accepted)     Able to afford home medications/ co-pay costs: Yes    ADLS:  Current PT AM-PAC Score:   /24  Current OT AM-PAC Score:   /24      DISCHARGE Disposition: Home- No Services Needed    LOC at discharge: Not Applicable  VASQUEZ Completed: Not Indicated    Notification completed in HENS/PAS?:  Not Applicable    IMM Completed:   Not Indicated    Transportation:  Transportation PLAN for discharge: family   Mode of Transport: [address]:  Home Care ordered at discharge: Not [address]: Not Applicable  Orders faxed: No    Durable Medical Equipment:  DME Provider: none  Equipment obtained during hospitalization:     Home Oxygen and Respiratory Equipment:  Oxygen needed at discharge?: Not [address]: Not Applicable  Portable tank available for discharge?: Not Indicated    Dialysis:  Dialysis patient: No    Dialysis Center:  Not Applicable    Additional CM Notes: Pt from home with family support no needs at DC, will return home, follow up out pt with cardiology. The Plan for Transition of Care is related to the following treatment goals of NSTEMI (non-ST elevated myocardial infarction) ([facility] [I21.4]    The Patient and/or patient representative Young Overton and his family were provided with a choice of provider and agrees with the discharge plan Yes    Freedom of choice list was provided with basic dialogue that supports the patient's individualized plan of care/goals and shares the quality data associated with the providers.  Not Indicated    Care Transitions patient: Yes    Cheryle Nuñez RN  The [facility]  Case Management Department  Ph: [phone]  Fax: [phone]

## 2021-07-05 NOTE — PLAN OF CARE
Problem: Activity Intolerance:  Goal: Ability to tolerate increased activity will improve  Description: Ability to tolerate increased activity will improve  Outcome: Ongoing  Note: Patient up as tolerated, denies dizziness or lightheadedness. Problem: Cardiac:  Goal: Ability to maintain vital signs within normal range will improve  Description: Ability to maintain vital signs within normal range will improve  7/5/2021 0608 by Arjun Weber RN  Outcome: Ongoing  Note: VSS thus far this shift. NSR on telemetry. Denies complaints of pain.

## 2021-07-05 NOTE — DISCHARGE SUMMARY
Hospital Medicine Discharge Summary    Patient ID: Jennifer Banks      Patient's PCP: Brown Gamez MD    Admit Date: 7/3/2021     Discharge Date:   7/5/2021    Admitting Physician: Shaquille Rosas MD     Discharge Physician: Shaquille Rosas MD     Discharge Diagnoses: Active Hospital Problems    Diagnosis Date Noted    CAD S/P percutaneous coronary angioplasty [I25.10, Z98.61]     Coronary vasospasm (HCC) [I20.1]     NSTEMI (non-ST elevated myocardial infarction) ([facility] [address] [I21.4] 07/03/2021       The patient was seen and examined on day of discharge and this discharge summary is in conjunction with any daily progress note from day of discharge. Hospital Course: Patient is 50-year-old male with history of morbid obesity, diabetes mellitus, hypertension, CHARLIE on CPAP came into ER with a complaint of upper back pain radiating towards the chest.  CT PE negative for PE or pulmonary edema. Admitted for NSTEMI  [facility] 7/3/2021: Diagonal 100% s/p DARLINE, % status post DARLINE, distal RCA 90% status post DARLINE, LVEDP 25 mmHg. Post left heart cath creatinine increased to 1.2 improved with fluid resuscitation. Patient was seen on day of discharge denies nausea, vomiting, chest pain, shortness of breath. Medically stable to be discharged after clearance from cardiology. #NSTEMI  [facility] 7/3/2021: Diagonal 100% s/p DARLINE, % status post DARLINE, distal RCA 90% status post DARLINE, LVEDP 25 mmHg. Cardiology number recommend to continue aspirin, Brilinta, Lopressor, Lipitor. Echo outpatient. Order placed. Needs to follow-up with Dr. Regla Chaudhary in 1 week.     #Hypertension stable  Continue amlodipine, Lopressor and losartan.     #Elevated creatinine post procedure  We will monitor. Encourage oral intake at home.     #Diabetes mellitus type 2  Hemoglobin A1c 6.6  Resume Metformin from tomorrow. .     #Obesity (Body mass index is 62.7 kg/m². ) - Complicating assessment and treatment.  Placing patient at risk for multiple co-morbidities as well as early death and contributing to the patient's presentation. Counseled on weight loss.      #CHARLIE on CPAP        Physical Exam Performed:     BP (!) 159/81   Pulse 82   Temp 97.8 °F (36.6 °C) (Oral)   Resp 20   Ht 5' 7\" (1.702 m)   Wt (!) 400 lb 12.7 oz (181.8 kg)   SpO2 96%   BMI 62.77 kg/m²       General appearance:  No apparent distress, appears stated age and cooperative. HEENT:  Normal cephalic, atraumatic without obvious deformity. Pupils equal, round, and reactive to light. Extra ocular muscles intact. Conjunctivae/corneas clear. Neck: Supple, with full range of motion. No jugular venous distention. Trachea midline. Respiratory:  Normal respiratory effort. Clear to auscultation, bilaterally without Rales/Wheezes/Rhonchi. Cardiovascular:  Regular rate and rhythm with normal S1/S2 without murmurs, rubs or gallops. Abdomen: Soft, non-tender, non-distended with normal bowel sounds. Musculoskeletal: Bilateral lower extremity edema. .  Skin: Skin color, texture, turgor normal.  No rashes or lesions. Neurologic:  Neurovascularly intact without any focal sensory/motor deficits. Cranial nerves: II-XII intact, grossly non-focal.  Psychiatric:  Alert and oriented, thought content appropriate, normal insight  Capillary Refill: Brisk,< 3 seconds   Peripheral Pulses: +2 palpable, equal bilaterally       Labs: For convenience and continuity at follow-up the following most recent labs are provided:      CBC:    Lab Results   Component Value Date    WBC 11.0 07/05/2021    HGB 11.0 07/05/2021    HCT 33.0 07/05/2021     07/05/2021       Renal:    Lab Results   Component Value Date     07/05/2021    K 3.5 07/05/2021    CL 98 07/05/2021    CO2 27 07/05/2021    BUN 18 07/05/2021    CREATININE 1.1 07/05/2021    CALCIUM 9.1 07/05/2021    PHOS 3.0 04/19/2015         Significant Diagnostic Studies    Radiology:   CT CHEST PULMONARY EMBOLISM W CONTRAST   Final Result   1.  No evidence for pulmonary embolism   2. Nonspecific mild lymphadenopathy bilateral axillary regions   3. Moderate diffuse fatty change of liver. Recommend correlation with liver function tests for significance. XR CHEST (2 VW)   Final Result   Mild cardia megaly             Consults:     IP CONSULT TO CARDIOLOGY  IP CONSULT TO HOSPITALIST  IP CONSULT TO CARDIAC REHAB    Disposition: Home    Condition at Discharge: Stable    Discharge Instructions/Follow-up: PCP, cardiology    Code Status:  Full Code     Activity: activity as tolerated    Diet: diabetic diet      Discharge Medications:     Current Discharge Medication List           Details   aspirin 81 MG EC tablet Take 1 tablet by mouth daily  Qty: 30 tablet, Refills: 1      nitroGLYCERIN (NITROSTAT) 0.4 MG SL tablet up to max of 3 total doses. If no relief after 1 dose, call 911.   Qty: 25 tablet, Refills: 3      ticagrelor (BRILINTA) 90 MG TABS tablet Take 1 tablet by mouth 2 times daily  Qty: 60 tablet, Refills: 1      metoprolol tartrate (LOPRESSOR) 50 MG tablet Take 1 tablet by mouth 2 times daily  Qty: 60 tablet, Refills: 1      losartan (COZAAR) 25 MG tablet Take 1 tablet by mouth daily  Qty: 30 tablet, Refills: 1      atorvastatin (LIPITOR) 40 MG tablet Take 1 tablet by mouth nightly  Qty: 30 tablet, Refills: 1              Details   metFORMIN (GLUCOPHAGE) 500 MG tablet TAKE ONE TABLET BY MOUTH ONCE DAILY WITH BREAKFAST  Qty: 90 tablet, Refills: 1              Details   sertraline (ZOLOFT) 50 MG tablet TAKE ONE TABLET BY MOUTH DAILY  Qty: 90 tablet, Refills: 5    Associated Diagnoses: Depression with anxiety      buPROPion (ZYBAN) 150 MG extended release tablet TAKE ONE TABLET BY MOUTH TWICE DAILY  Qty: 180 tablet, Refills: 1    Associated Diagnoses: Depression with anxiety      allopurinol (ZYLOPRIM) 100 MG tablet Take 1 tablet by mouth daily  Qty: 90 tablet, Refills: 1    Associated Diagnoses: Chronic gout involving toe without tophus, unspecified cause, unspecified laterality      Potassium Gluconate 595 (99 K) MG TABS Take by mouth      Cholecalciferol (VITAMIN D) 50 MCG (2000 UT) CAPS capsule One po qd  Qty: 90 capsule, Refills: 1    Associated Diagnoses: Vitamin D insufficiency      Ascorbic Acid (VITAMIN C) 250 MG tablet Take 250 mg by mouth daily             Time Spent on discharge is more than 30 minutes in the examination, evaluation, counseling and review of medications and discharge plan. This chart was likely completed using voice recognition technology and may contain unintended grammatical , phraseology,and/or punctuation errors    Signed:    Brigitte Renetria MD   7/5/2021      Thank you Sharilyn Sicard, MD for the opportunity to be involved in this patient's care. If you have any questions or concerns please feel free to contact me at [phone].

## 2021-07-05 NOTE — PROGRESS NOTES
Discharge - pt discharged per order. IVs and tele monitoring discontinued. Pt aware of new medications and future follow ups. Pharmacist came to bedside to provide education on new medications prior to discharge. Pt wheeled off unit by RN.

## 2021-07-06 ENCOUNTER — CARE COORDINATION (OUTPATIENT)
Dept: CASE MANAGEMENT | Age: 46
End: 2021-07-06

## 2021-07-06 NOTE — CARE COORDINATION
[facility] Transitions Initial Follow Up Call    Call within 2 business days of discharge: No    Patient:  Jojo Masters  Patient :  1975  MRN:  [id number]    Reason for Admission:  STEMI  Discharge Date:  21   RARS: 12      CTC attempt to reach Pt regarding recent hospital discharge. CTC left voice recording with call back number requesting a call back. Follow up appointments:    Future Appointments   Date Time Provider [facility]   2021  2:45 PM Dong Lobo MD [facility]   2021  3:00 PM MD BRENT Vigil  BRANDIE Casanova, RN  Care Transition Coordinator  Contact Number:  [phone]

## 2021-07-08 ENCOUNTER — CARE COORDINATION (OUTPATIENT)
Dept: CASE MANAGEMENT | Age: 46
End: 2021-07-08

## 2021-07-09 ENCOUNTER — OFFICE VISIT (OUTPATIENT)
Dept: CARDIOLOGY CLINIC | Age: 46
End: 2021-07-09
Payer: COMMERCIAL

## 2021-07-09 VITALS
SYSTOLIC BLOOD PRESSURE: 130 MMHG | WEIGHT: 315 LBS | BODY MASS INDEX: 63.4 KG/M2 | HEART RATE: 84 BPM | DIASTOLIC BLOOD PRESSURE: 70 MMHG

## 2021-07-09 DIAGNOSIS — I50.32 CHRONIC HEART FAILURE WITH PRESERVED EJECTION FRACTION (HCC): ICD-10-CM

## 2021-07-09 PROCEDURE — 99215 OFFICE O/P EST HI 40 MIN: CPT | Performed by: INTERNAL MEDICINE

## 2021-07-09 RX ORDER — FUROSEMIDE 20 MG/1
20 TABLET ORAL DAILY
Qty: 90 TABLET | Refills: 3 | Status: SHIPPED | OUTPATIENT
Start: 2021-07-09 | End: 2022-03-25 | Stop reason: SDUPTHER

## 2021-07-09 NOTE — PROGRESS NOTES
Cc: HFpEF, CAD/NSTEMI s/p stents    HPI:     Tyson Zavaleta is a 40 y. o. male with a past medical history of morbid obesity (BMI 64), borderline DM, HTN, borderline HLP, past smoker (quit 6 to 7 years ago), CAD s/p NSTEMI s/p .     Patient was admitted at the [facility] hospital 7/37/5/2021 with NSTEMI (peak 0.2). He had a PCI.     [facility] 7/3/2021: Diagonal 100% s/p DARLINE, OM 90% status post DARLINE, distal RCA 90% status post DARLINE, LVEDP 25 mmHg. Patient is here for follow-up. At the hospital discharge weight was 400 pounds. Today his weight in the office is 404 pounds. He does not check his weight at home. He admits to excess salt in his diet. He is compliant with his medications. He has noticed mild lower extremity edema. Histories     Past Medical History:   has a past medical history of Abdominal hernia, Alcohol abuse, Anxiety, CAD S/P percutaneous coronary angioplasty, Clostridium difficile diarrhea, Clostridium difficile diarrhea, DVT (deep venous thrombosis) ([address] DVT of axillary vein, acute left ([address] Gout, H/O ulcer disease, Hernia, History of blood transfusion, Hyperlipemia, Hypertension, Hypertension, essential, Kidney congenitally absent, left, Kidney disease, Lightheaded, Obesity, Obstructive sleep apnea (adult) (pediatric), Renal agenesis, and Severe single current episode of major depressive disorder, without psychotic features ([address] Surgical History:   has a past surgical history that includes hernia repair; ventral hernia repair (3/23/15); other surgical history (4/20/2015); ventral hernia repair (6/2/16); and Tonsillectomy and adenoidectomy. Social History:   reports that he quit smoking about 3 years ago. He has a 2.00 pack-year smoking history. He has never used smokeless tobacco. He reports that he does not drink alcohol and does not use drugs.      Family History:  No evidence for sudden cardiac death or premature CAD      Medications:     Home medications were reviewed and are listed below    Prior to Admission medications    Medication Sig Start Date End Date Taking? Authorizing Provider   furosemide (LASIX) 20 MG tablet Take 1 tablet by mouth daily 7/9/21  Yes Robert Vance MD   aspirin 81 MG EC tablet Take 1 tablet by mouth daily 7/6/21  Yes Ilda Valenzuela MD   nitroGLYCERIN (NITROSTAT) 0.4 MG SL tablet up to max of 3 total doses. If no relief after 1 dose, call 911. 7/5/21  Yes Ilda Valenzuela MD   metFORMIN (GLUCOPHAGE) 500 MG tablet TAKE ONE TABLET BY MOUTH ONCE DAILY WITH BREAKFAST 7/6/21  Yes Ilda Valenzuela MD   ticagrelor (BRILINTA) 90 MG TABS tablet Take 1 tablet by mouth 2 times daily 7/5/21  Yes Ilda Valenzuela MD   metoprolol tartrate (LOPRESSOR) 50 MG tablet Take 1 tablet by mouth 2 times daily 7/5/21  Yes Ilda Valenzuela MD   losartan (COZAAR) 25 MG tablet Take 1 tablet by mouth daily 7/6/21  Yes Ilda Valenzuela MD   atorvastatin (LIPITOR) 40 MG tablet Take 1 tablet by mouth nightly 7/5/21  Yes Ilda Valenzuela MD   sertraline (ZOLOFT) 50 MG tablet TAKE ONE TABLET BY MOUTH DAILY 5/21/21  Yes Tri Victor MD   buPROPion (ZYBAN) 150 MG extended release tablet TAKE ONE TABLET BY MOUTH TWICE DAILY 5/21/21  Yes Tri Victor MD   allopurinol (ZYLOPRIM) 100 MG tablet Take 1 tablet by mouth daily 5/21/21  Yes Tri Victro MD   Potassium Gluconate 595 (99 K) MG TABS Take by mouth   Yes Historical Provider, MD   Cholecalciferol (VITAMIN D) 50 MCG (2000 UT) CAPS capsule One po qd 11/19/19  Yes Tri Victor MD   Ascorbic Acid (VITAMIN C) 250 MG tablet Take 250 mg by mouth daily   Yes Historical Provider, MD          Allergy:     Patient has no known allergies. Review of Systems:     All 12 point review of symptoms completed. Pertinent positives identified in the HPI, all other review of symptoms negative as below. CONSTITUTIONAL: No fatigue  SKIN: No rash or pruritis. EYES: No visual changes or diplopia. No scleral icterus. ENT: No Headaches, hearing loss or vertigo.  No mouth sores or sore throat. CARDIOVASCULAR: No chest pain/chest pressure/chest discomfort. No palpitations. No edema. RESPIRATORY: No dyspnea. No cough or wheezing, no sputum production. GASTROINTESTINAL: No N/V/D. No abdominal pain, appetite loss, blood in stools. GENITOURINARY: No dysuria, trouble voiding, or hematuria. MUSCULOSKELETAL:  No gait disturbance, weakness or joint complaints. NEUROLOGICAL: No headache, diplopia, change in muscle strength, numbness or tingling. No change in gait, balance, coordination, mood, affect, memory, mentation, behavior. PSHYCH: No anxiety, loss of interest, change in sexual behavior, feelings of self-harm, or confusion. ENDOCRINE: No excessive thirst, fluid intake, or urination. No tremor. HEMATOLOGIC: No abnormal bruising or bleeding. ALLERGY: No nasal congestion or hives.       Physical Examination:     Vitals:    07/09/21 1441   BP: 130/70   Pulse: 84   Weight: (!) 404 lb 12.8 oz (183.6 kg)       Wt Readings from Last 3 Encounters:   07/09/21 (!) 404 lb 12.8 oz (183.6 kg)   07/05/21 (!) 400 lb 12.7 oz (181.8 kg)   06/08/21 (!) 404 lb (183.3 kg)         General Appearance:  Alert, cooperative, no distress, appears stated age Appropriate weight   Head:  Normocephalic, without obvious abnormality, atraumatic   Eyes:  PERRL, conjunctiva/corneas clear EOM intact  Ears normal   Throat no lesions       Nose: Nares normal, no drainage or sinus tenderness   Throat: Lips, mucosa, and tongue normal   Neck: Supple, symmetrical, trachea midline, no adenopathy, thyroid: not enlarged, symmetric, no tenderness/mass/nodules, no carotid bruit       Lungs:   Clear to auscultation bilaterally, respirations unlabored   Chest Wall:  No tenderness or deformity   Heart:  Regular rhythm, rate is controlled, S1, S2 normal, there is no murmur, there is no rub or gallop, no jvd, 1-2+ bilateral lower extremity edema   Abdomen:   Soft, non-tender, bowel sounds active all four quadrants,  no diagnosis      Assessment / Plan:      Diagnosis Orders   1. Chronic heart failure with preserved ejection fraction ([facility] [address]          1.  CAD with NSTEMI s/p PCI:  Patient has no concerning symptoms. His compliant with his medications. Continue with aspirin 81 daily, Lipitor 40 daily, Brilinta, Lopressor 50 twice daily     2.  Essential hypertension:  BP is well controlled with current medications. Continue with losartan 25 daily, Lopressor 50 twice daily.     3.  Hyperlipidemia:  Recent lipid profile was reviewed, LDL 80s, off statin.     Agree with Lipitor 40 daily. 4.  Acute HFpEF:  Patient appears to have mild decompensation with volume overload, most likely due to poor compliance with low-salt diet. I highly recommended low-salt diet (goal sodium less than 2000 mg/day)  Daily weights and frequent BP checks  We will start Lasix 20 mg daily, may need to uptitrate for effect  Next time will need to order an echocardiogram to assess LV function. Return in about 4 weeks (around 8/6/2021). I have spent 42 minutes of face to face time with the patient with more than 50% spent counseling and coordinating care. I have personally reviewed the reports and images of labs, radiological studies, cardiac studies including ECG's and telemetry, current and old medical records. The note was completed using EMR and Dragon dictation system. Every effort was made to ensure accuracy; however, inadvertent computerized transcription errors may be present. All questions and concerns were addressed to the patient/family. Alternatives to my treatment were discussed. I would like to thank you for providing me the opportunity to participate in the care of your patient. If you have any questions, please do not hesitate to contact me.      Makeda Chin MD, [address] [place] [address]  Main Office Phone: [phone]  Heart Failure Hotline: [phone]  Fax: [phone]

## 2021-07-13 ENCOUNTER — TELEMEDICINE (OUTPATIENT)
Dept: FAMILY MEDICINE CLINIC | Age: 46
End: 2021-07-13
Payer: COMMERCIAL

## 2021-07-13 DIAGNOSIS — E11.9 TYPE 2 DIABETES MELLITUS WITHOUT COMPLICATION, WITHOUT LONG-TERM CURRENT USE OF INSULIN (HCC): ICD-10-CM

## 2021-07-13 DIAGNOSIS — I25.118 CORONARY ARTERY DISEASE OF NATIVE HEART WITH STABLE ANGINA PECTORIS, UNSPECIFIED VESSEL OR LESION TYPE (HCC): Primary | ICD-10-CM

## 2021-07-13 DIAGNOSIS — E66.01 MORBID OBESITY (HCC): ICD-10-CM

## 2021-07-13 DIAGNOSIS — G47.33 OSA (OBSTRUCTIVE SLEEP APNEA): ICD-10-CM

## 2021-07-13 DIAGNOSIS — I10 BENIGN ESSENTIAL HTN: ICD-10-CM

## 2021-07-13 PROCEDURE — 99214 OFFICE O/P EST MOD 30 MIN: CPT | Performed by: FAMILY MEDICINE

## 2021-07-13 ASSESSMENT — PATIENT HEALTH QUESTIONNAIRE - PHQ9
SUM OF ALL RESPONSES TO PHQ QUESTIONS 1-9: 0
1. LITTLE INTEREST OR PLEASURE IN DOING THINGS: 0
SUM OF ALL RESPONSES TO PHQ QUESTIONS 1-9: 0
SUM OF ALL RESPONSES TO PHQ QUESTIONS 1-9: 0
2. FEELING DOWN, DEPRESSED OR HOPELESS: 0
SUM OF ALL RESPONSES TO PHQ9 QUESTIONS 1 & 2: 0

## 2021-07-13 ASSESSMENT — ENCOUNTER SYMPTOMS
SHORTNESS OF BREATH: 0
VOMITING: 0
BACK PAIN: 0
WHEEZING: 0
NAUSEA: 0
CHEST TIGHTNESS: 0
ABDOMINAL PAIN: 0

## 2021-07-13 NOTE — PROGRESS NOTES
Subjective:      Trina Oliveira, was evaluated through a synchronous (real-time) audio-video encounter. The patient (or guardian if applicable) is aware that this is a billable service. Verbal consent to proceed has been obtained within the past 12 months. The visit was conducted pursuant to the emergency declaration under the [facility], [address] [place] [address] authority and the [employer] Act. Patient identification was verified, and a caregiver was present when appropriate. The patient was located in a state where the provider was credentialed to provide care. Total time spent for this encounter: Not billed by time    --Kaitlynn Madrid MD on 7/13/2021 at 3:16 PM    An electronic signature was used to authenticate this note. Patient ID: Trina Oliveira is a 40 y.o. male. Here for follow up from hospital fu   Reason for visit: chest pain   Diagnosis given: NSTEMI  Treatment: angiogram , 3 stents   Work up: blood work , ctpa, angiogram   Recommended follow up: 2 weeks   Now with following symptoms       Coronary Artery Disease  Presents for initial visit. The disease course has been improving. Pertinent negatives include no chest pain, chest pressure, chest tightness, dizziness, leg swelling, muscle weakness, palpitations, shortness of breath or weight gain. Risk factors include diabetes, hyperlipidemia, hypertension and obesity. Risk factors do not include decreased physical activity. Past treatments include aspirin, antiplatelet agents and beta blockers (ARB). The treatment provided significant relief. Compliance with prior treatments has been good. His past medical history is significant for CHF and past myocardial infarction. Past surgical history includes cardiac stents.      Hypertension    bp at home is not been checked  , denies  ha, visual changes, syncope, presyncope, cp, sob, palpitations, edema, solis, orthopnea, pnd,  Compliant with medication, no secondary effects     DM   Compliant with metformin  No sec effects    Obesity:   Not exercising,   Diet: no changes     CHARLIE  Compliant with cpap    Anemia:   No hx of bleeding        Review of Systems   Constitutional: Negative for activity change, appetite change, fatigue, unexpected weight change and weight gain. Eyes: Negative for visual disturbance. Respiratory: Negative for chest tightness, shortness of breath and wheezing. Cardiovascular: Negative for chest pain, palpitations and leg swelling. Gastrointestinal: Negative for abdominal pain, nausea and vomiting. Musculoskeletal: Negative for back pain and muscle weakness. Neurological: Negative for dizziness and light-headedness. Objective:   Physical Exam  Constitutional:       General: He is not in acute distress. Appearance: Normal appearance. He is obese. He is not ill-appearing, toxic-appearing or diaphoretic. HENT:      Head: Normocephalic and atraumatic. Pulmonary:      Effort: No respiratory distress. Musculoskeletal:      Cervical back: Normal range of motion. Neurological:      General: No focal deficit present. Mental Status: He is alert and oriented to person, place, and time. Mental status is at baseline. Psychiatric:         Mood and Affect: Mood normal.         Behavior: Behavior normal.         Thought Content: Thought content normal.       Hospital notes, and results reviewed and interpreted by me for todays apt    Assessment:      CAD  HTN   DM   Obesity  charlie  Anemia        Plan:      1. Encourage compliance with medication, life style changes  Med rec reviewed for this apt      2. Stable  Continue same medications no changes needed at this time   Fu 3 mo    3. a1c at goal   Continue med     4. encourage wt loss, healthy diet discussed    5. encourage compliance with charlie    6.  Re check cbc in 3mo ok Jo Mitchell MD

## 2021-07-15 ENCOUNTER — HOSPITAL ENCOUNTER (OUTPATIENT)
Dept: NON INVASIVE DIAGNOSTICS | Age: 46
Discharge: HOME | End: 2021-07-15
Payer: COMMERCIAL

## 2021-07-15 DIAGNOSIS — I21.4 NSTEMI (NON-ST ELEVATED MYOCARDIAL INFARCTION) (HCC): ICD-10-CM

## 2021-07-15 LAB
LV EF: 55 %
LVEF MODALITY: NORMAL

## 2021-07-15 PROCEDURE — C8929 TTE W OR WO FOL WCON,DOPPLER: HCPCS

## 2021-07-15 PROCEDURE — 6360000004 HC RX CONTRAST MEDICATION: Performed by: INTERNAL MEDICINE

## 2021-07-15 RX ADMIN — PERFLUTREN 1.81 MG: 6.52 INJECTION, SUSPENSION INTRAVENOUS at 16:10

## 2021-07-23 ENCOUNTER — TELEPHONE (OUTPATIENT)
Dept: FAMILY MEDICINE CLINIC | Age: 46
End: 2021-07-23

## 2021-07-23 RX ORDER — SULFAMETHOXAZOLE AND TRIMETHOPRIM 800; 160 MG/1; MG/1
1 TABLET ORAL 2 TIMES DAILY
Qty: 20 TABLET | Refills: 0 | Status: SHIPPED | OUTPATIENT
Start: 2021-07-23 | End: 2021-08-02

## 2021-07-23 NOTE — TELEPHONE ENCOUNTER
Last OV:  7/13/2021    ----- Message from Alley Campbell sent at 7/23/2021  2:43 PM EDT -----  Subject: Message to Provider    QUESTIONS  Information for Provider? Have a wound that is still slightly open , he   has had something like this before and was just wondering can he get some   type of antibiotic until his appt  ---------------------------------------------------------------------------  --------------  CALL BACK INFO  What is the best way for the office to contact you? OK to leave message on   voicemail, OK to respond with electronic message via [employer] portal (only   for patients who have registered [employer] account)  Preferred Call Back Phone Number? [phone]  ---------------------------------------------------------------------------  --------------  SCRIPT ANSWERS  Relationship to Patient?  Self

## 2021-07-23 NOTE — TELEPHONE ENCOUNTER
rx transmitted electronically to the pharmacy via [employer]   Let patient know and verify pharmacy  For antibiotic

## 2021-08-17 ENCOUNTER — OFFICE VISIT (OUTPATIENT)
Dept: CARDIOLOGY CLINIC | Age: 46
End: 2021-08-17
Payer: COMMERCIAL

## 2021-08-17 VITALS
SYSTOLIC BLOOD PRESSURE: 120 MMHG | BODY MASS INDEX: 62.68 KG/M2 | HEART RATE: 68 BPM | DIASTOLIC BLOOD PRESSURE: 70 MMHG | WEIGHT: 315 LBS

## 2021-08-17 DIAGNOSIS — I10 HYPERTENSION, ESSENTIAL: Primary | ICD-10-CM

## 2021-08-17 PROCEDURE — 99214 OFFICE O/P EST MOD 30 MIN: CPT | Performed by: INTERNAL MEDICINE

## 2021-08-17 NOTE — PROGRESS NOTES
Cc: HFpEF, CAD/NSTEMI s/p stents    HPI:     Toño Zavaleta is a 40 y. o. male with a past medical history of morbid obesity (BMI 64), borderline DM, HTN, borderline HLP, past smoker (quit 6 to 7 years ago), CAD s/p NSTEMI s/p .     Patient was admitted at the [facility] 7/37/5/2021 with NSTEMI (peak 0.2). He had a PCI.     LHC 7/3/2021: Diagonal 100% s/p DARLINE, OM 90% status post DARLINE, distal RCA 90% status post DARLINE, LVEDP 25 mmHg. Echo 07/2021: normal    FLP 7/4/21: , HDL 34, LDL 67,  off statin.      Patient is here for follow-up. He reports no complaints. He received both covid shots. He follows a very low salt diet. Lasix 20 daily has helped with leg edema and weight is now lower by 4 lbs. Histories     Past Medical History:   has a past medical history of Abdominal hernia, Alcohol abuse, Anxiety, CAD S/P percutaneous coronary angioplasty, Clostridium difficile diarrhea, Clostridium difficile diarrhea, DVT (deep venous thrombosis) ([address] DVT of axillary vein, acute left ([address] Gout, H/O ulcer disease, Hernia, History of blood transfusion, Hyperlipemia, Hypertension, Hypertension, essential, Kidney congenitally absent, left, Kidney disease, Lightheaded, Obesity, Obstructive sleep apnea (adult) (pediatric), Renal agenesis, and Severe single current episode of major depressive disorder, without psychotic features ([address] Surgical History:   has a past surgical history that includes hernia repair; ventral hernia repair (3/23/15); other surgical history (4/20/2015); ventral hernia repair (6/2/16); and Tonsillectomy and adenoidectomy. Social History:   reports that he quit smoking about 4 years ago. He has a 2.00 pack-year smoking history. He has never used smokeless tobacco. He reports that he does not drink alcohol and does not use drugs.      Family History:  No evidence for sudden cardiac death or premature CAD      Medications:     Home medications were reviewed and are listed below    Prior to Admission medications    Medication Sig Start Date End Date Taking? Authorizing Provider   furosemide (LASIX) 20 MG tablet Take 1 tablet by mouth daily 7/9/21  Yes Ivory Villasenor MD   aspirin 81 MG EC tablet Take 1 tablet by mouth daily 7/6/21  Yes Gabriele Burr MD   nitroGLYCERIN (NITROSTAT) 0.4 MG SL tablet up to max of 3 total doses. If no relief after 1 dose, call 911. 7/5/21  Yes Gabriele Burr MD   metFORMIN (GLUCOPHAGE) 500 MG tablet TAKE ONE TABLET BY MOUTH ONCE DAILY WITH BREAKFAST 7/6/21  Yes Gabriele Burr MD   ticagrelor (BRILINTA) 90 MG TABS tablet Take 1 tablet by mouth 2 times daily 7/5/21  Yes Gabriele Burr MD   metoprolol tartrate (LOPRESSOR) 50 MG tablet Take 1 tablet by mouth 2 times daily 7/5/21  Yes Gabriele Burr MD   losartan (COZAAR) 25 MG tablet Take 1 tablet by mouth daily 7/6/21  Yes Gabriele Burr MD   atorvastatin (LIPITOR) 40 MG tablet Take 1 tablet by mouth nightly 7/5/21  Yes Gabriele Burr MD   sertraline (ZOLOFT) 50 MG tablet TAKE ONE TABLET BY MOUTH DAILY 5/21/21  Yes Karlyn Ganser, MD   buPROPion (ZYBAN) 150 MG extended release tablet TAKE ONE TABLET BY MOUTH TWICE DAILY 5/21/21  Yes Karlyn Ganser, MD   allopurinol (ZYLOPRIM) 100 MG tablet Take 1 tablet by mouth daily 5/21/21  Yes Karlyn Ganser, MD   Potassium Gluconate 595 (99 K) MG TABS Take by mouth   Yes Historical Provider, MD   Cholecalciferol (VITAMIN D) 50 MCG (2000 UT) CAPS capsule One po qd 11/19/19  Yes Karlyn Ganser, MD   Ascorbic Acid (VITAMIN C) 250 MG tablet Take 250 mg by mouth daily   Yes Historical Provider, MD          Allergy:     Patient has no known allergies. Review of Systems:     All 12 point review of symptoms completed. Pertinent positives identified in the HPI, all other review of symptoms negative as below. CONSTITUTIONAL: No fatigue  SKIN: No rash or pruritis. EYES: No visual changes or diplopia. No scleral icterus. ENT: No Headaches, hearing loss or vertigo.  No mouth sores or sore throat. CARDIOVASCULAR: No chest pain/chest pressure/chest discomfort. No palpitations. No edema. RESPIRATORY: No dyspnea. No cough or wheezing, no sputum production. GASTROINTESTINAL: No N/V/D. No abdominal pain, appetite loss, blood in stools. GENITOURINARY: No dysuria, trouble voiding, or hematuria. MUSCULOSKELETAL:  No gait disturbance, weakness or joint complaints. NEUROLOGICAL: No headache, diplopia, change in muscle strength, numbness or tingling. No change in gait, balance, coordination, mood, affect, memory, mentation, behavior. PSHYCH: No anxiety, loss of interest, change in sexual behavior, feelings of self-harm, or confusion. ENDOCRINE: No excessive thirst, fluid intake, or urination. No tremor. HEMATOLOGIC: No abnormal bruising or bleeding. ALLERGY: No nasal congestion or hives.       Physical Examination:     Vitals:    08/17/21 1518   BP: 120/70   Pulse: 68   Weight: (!) 400 lb 3.2 oz (181.5 kg)       Wt Readings from Last 3 Encounters:   08/17/21 (!) 400 lb 3.2 oz (181.5 kg)   07/09/21 (!) 404 lb 12.8 oz (183.6 kg)   07/05/21 (!) 400 lb 12.7 oz (181.8 kg)         General Appearance:  Alert, cooperative, no distress, appears stated age Appropriate weight   Head:  Normocephalic, without obvious abnormality, atraumatic   Eyes:  PERRL, conjunctiva/corneas clear EOM intact  Ears normal   Throat no lesions       Nose: Nares normal, no drainage or sinus tenderness   Throat: Lips, mucosa, and tongue normal   Neck: Supple, symmetrical, trachea midline, no adenopathy, thyroid: not enlarged, symmetric, no tenderness/mass/nodules, no carotid bruit       Lungs:   Clear to auscultation bilaterally, respirations unlabored   Chest Wall:  No tenderness or deformity   Heart:  Regular rhythm, rate is controlled, S1, S2 normal, there is no murmur, there is no rub or gallop, no jvd, no bilateral lower extremity edema   Abdomen:   Soft, non-tender, bowel sounds active all four quadrants,  no masses, no organomegaly       Extremities: Extremities normal, atraumatic, no cyanosis   Pulses: 2+ and symmetric   Skin: Skin color, texture, turgor normal, no rashes or lesions   Pysch: Normal mood and affect   Neurologic: Normal gross motor and sensory exam.  Cranial nerves intact        Labs:     Lab Results   Component Value Date    WBC 11.0 07/05/2021    HGB 11.0 (L) 07/05/2021    HCT 33.0 (L) 07/05/2021    MCV 81.4 07/05/2021     07/05/2021     Lab Results   Component Value Date     (L) 07/05/2021    K 3.5 07/05/2021    CL 98 (L) 07/05/2021    CO2 27 07/05/2021    BUN 18 07/05/2021    CREATININE 1.1 07/05/2021    GLUCOSE 130 (H) 07/05/2021    CALCIUM 9.1 07/05/2021    PROT 6.9 07/03/2021    LABALBU 3.9 07/03/2021    BILITOT <0.2 07/03/2021    ALKPHOS 115 07/03/2021    AST 34 07/03/2021    ALT 44 (H) 07/03/2021    LABGLOM >60 07/05/2021    GFRAA >60 07/05/2021    AGRATIO 1.3 07/03/2021    GLOB 3.0 07/03/2021         Lab Results   Component Value Date    CHOL 130 07/04/2021    CHOL 160 03/02/2021    CHOL 161 10/28/2020     Lab Results   Component Value Date    TRIG 144 07/04/2021    TRIG 155 (H) 03/02/2021    TRIG 120 10/28/2020     Lab Results   Component Value Date    HDL 34 (L) 07/04/2021    HDL 48 03/02/2021    HDL 50 10/28/2020     Lab Results   Component Value Date    LDLCALC 67 07/04/2021    LDLCALC 81 03/02/2021    LDLCALC 87 10/28/2020     Lab Results   Component Value Date    LABVLDL 29 07/04/2021    LABVLDL 31 03/02/2021    LABVLDL 24 10/28/2020     Lab Results   Component Value Date    CHOLHDLRATIO 4.0 02/04/2014    CHOLHDLRATIO 4.7 01/04/2013       Lab Results   Component Value Date    INR 0.94 07/03/2021    INR 0.98 06/17/2019    INR 2.26 (H) 08/31/2015    PROTIME 10.6 07/03/2021    PROTIME 11.2 06/17/2019    PROTIME 25.5 (H) 08/31/2015       The ASCVD Risk score (Lis Tejada, et al., 2013) failed to calculate for the following reasons:     The patient has a prior MI or stroke diagnosis      Assessment / Plan:      Diagnosis Orders   1. Hypertension, essential          1.  CAD with NSTEMI s/p PCI:  Patient has no concerning symptoms. His compliant with his medications.     Continue with aspirin 81 daily, Lipitor 40 daily, Brilinta, Lopressor 50 twice daily     2.  Essential hypertension:  BP is well controlled with current medications.     Continue with losartan 25 daily, Lopressor 50 twice daily.     3.  Hyperlipidemia:  Recent lipid profile was reviewed, LDL 80s, off statin.     Agree with Lipitor 40 daily.     4. Chronic HFpEF:  Patient appears euvolemic and hemo stable.      I highly recommended low-salt diet (goal sodium less than 2000 mg/day)  Daily weights and frequent BP checks  Cw Lasix 20 mg daily        Return in about 6 months (around 2/17/2022). I have spent 35 minutes of face to face time with the patient with more than 50% spent counseling and coordinating care. I have personally reviewed the reports and images of labs, radiological studies, cardiac studies including ECG's and telemetry, current and old medical records. The note was completed using EMR and Dragon dictation system. Every effort was made to ensure accuracy; however, inadvertent computerized transcription errors may be present. All questions and concerns were addressed to the patient/family. Alternatives to my treatment were discussed. I would like to thank you for providing me the opportunity to participate in the care of your patient. If you have any questions, please do not hesitate to contact me.      Elisa Guerrero MD, [address] [address] Phone: [phone]  Heart Failure Hotline: [phone]  Fax: [phone]

## 2021-08-24 ENCOUNTER — OFFICE VISIT (OUTPATIENT)
Dept: FAMILY MEDICINE CLINIC | Age: 46
End: 2021-08-24
Payer: COMMERCIAL

## 2021-08-24 VITALS
OXYGEN SATURATION: 98 % | DIASTOLIC BLOOD PRESSURE: 86 MMHG | BODY MASS INDEX: 49.44 KG/M2 | RESPIRATION RATE: 16 BRPM | HEIGHT: 67 IN | TEMPERATURE: 97.5 F | SYSTOLIC BLOOD PRESSURE: 124 MMHG | WEIGHT: 315 LBS | HEART RATE: 87 BPM

## 2021-08-24 DIAGNOSIS — K43.9 VENTRAL HERNIA WITHOUT OBSTRUCTION OR GANGRENE: ICD-10-CM

## 2021-08-24 DIAGNOSIS — Z02.9 ADMINISTRATIVE ENCOUNTER: ICD-10-CM

## 2021-08-24 DIAGNOSIS — I25.118 CORONARY ARTERY DISEASE OF NATIVE HEART WITH STABLE ANGINA PECTORIS, UNSPECIFIED VESSEL OR LESION TYPE (HCC): ICD-10-CM

## 2021-08-24 DIAGNOSIS — G47.33 OSA (OBSTRUCTIVE SLEEP APNEA): ICD-10-CM

## 2021-08-24 DIAGNOSIS — E11.9 TYPE 2 DIABETES MELLITUS WITHOUT COMPLICATION, WITHOUT LONG-TERM CURRENT USE OF INSULIN (HCC): Primary | ICD-10-CM

## 2021-08-24 DIAGNOSIS — F41.8 DEPRESSION WITH ANXIETY: ICD-10-CM

## 2021-08-24 PROCEDURE — 99214 OFFICE O/P EST MOD 30 MIN: CPT | Performed by: FAMILY MEDICINE

## 2021-08-24 ASSESSMENT — ENCOUNTER SYMPTOMS
BLURRED VISION: 0
ABDOMINAL PAIN: 1
VISUAL CHANGE: 0

## 2021-08-24 ASSESSMENT — PATIENT HEALTH QUESTIONNAIRE - PHQ9
SUM OF ALL RESPONSES TO PHQ QUESTIONS 1-9: 0
SUM OF ALL RESPONSES TO PHQ9 QUESTIONS 1 & 2: 0
SUM OF ALL RESPONSES TO PHQ QUESTIONS 1-9: 0
1. LITTLE INTEREST OR PLEASURE IN DOING THINGS: 0
SUM OF ALL RESPONSES TO PHQ QUESTIONS 1-9: 0
2. FEELING DOWN, DEPRESSED OR HOPELESS: 0

## 2021-08-27 RX ORDER — ASPIRIN 81 MG/1
81 TABLET ORAL DAILY
Qty: 30 TABLET | Refills: 5 | Status: SHIPPED | OUTPATIENT
Start: 2021-08-27

## 2021-08-27 RX ORDER — LOSARTAN POTASSIUM 25 MG/1
25 TABLET ORAL DAILY
Qty: 30 TABLET | Refills: 5 | Status: SHIPPED | OUTPATIENT
Start: 2021-08-27 | End: 2022-02-23 | Stop reason: SDUPTHER

## 2021-08-27 NOTE — TELEPHONE ENCOUNTER
patient out of medicine   last appt  8-17-21     Next appt  2-16-22     aspirin 81 MG EC tablet  Take 1 tablet by mouth daily, Disp-30 tablet, R-1  Normal Last Dose: Not Recorded  Refills:1 ordered [facility] #[id number] - [facility] [address] - P [phone] - F [phone]  atorvastatin (LIPITOR) 40 MG tablet  Take 1 tablet by mouth nightly, Disp-30 tablet, R-1  Normal Last Dose: Not Recorded  Refills:1 ordered [facility] #[id number] [place] [id number] [facility] [address] - P [phone] - F [phone]               ticagrelor (BRILINTA) 90 MG TABS tablet  Take 1 tablet by mouth 2 times daily, Disp-60 tablet, R-1  Normal Last Dose: Not Recorded  Refills:1 ordered [facility] #[id number] [address] - P [phone] - F [phone]                     losartan (COZAAR) 25 MG tablet  Take 1 tablet by mouth daily, Disp-30 tablet, R-1  Normal Last Dose: Not Recorded  Refills:1 ordered [id number] [address] [place] [address] [address] - F [phone] normal...

## 2021-08-31 RX ORDER — ATORVASTATIN CALCIUM 40 MG/1
40 TABLET, FILM COATED ORAL NIGHTLY
Qty: 90 TABLET | Refills: 3 | Status: SHIPPED | OUTPATIENT
Start: 2021-08-31 | End: 2022-09-02

## 2021-08-31 NOTE — TELEPHONE ENCOUNTER
Requested Prescriptions     Pending Prescriptions Disp Refills    atorvastatin (LIPITOR) 40 MG tablet 90 tablet 3     Sig: Take 1 tablet by mouth nightly                  Last Office Visit: 8/17/2021     Next Office Visit: 2/16/2022         Last Labs:7/4/2021lipid panel

## 2021-08-31 NOTE — TELEPHONE ENCOUNTER
I Medication Refills:    atorvastatin (LIPITOR) 40 MG tablet  Take 1 tablet by mouth nightly, Disp-30 tablet, R-1    Pharmacy:[facility] #[id number] [place] [phone]           Last Office Visit: 08/17/21    Next Office Visit: 02/16/22    Last Refill: 07/05/21    Last Labs: 07/05/21

## 2021-09-01 ENCOUNTER — TELEPHONE (OUTPATIENT)
Dept: CARDIOLOGY CLINIC | Age: 46
End: 2021-09-01

## 2021-09-01 RX ORDER — METOPROLOL TARTRATE 50 MG/1
50 TABLET, FILM COATED ORAL 2 TIMES DAILY
Qty: 180 TABLET | Refills: 1 | Status: CANCELLED | OUTPATIENT
Start: 2021-09-01

## 2021-09-01 NOTE — TELEPHONE ENCOUNTER
Requested Prescriptions     Pending Prescriptions Disp Refills    metoprolol tartrate (LOPRESSOR) 50 MG tablet 180 tablet 1     Sig: Take 1 tablet by mouth 2 times daily          Number: 180    Refills: 3    Last Office Visit: 8/17/2021     Next Office Visit: 2/16/2022       PATIENT IS OUT

## 2021-09-02 RX ORDER — METOPROLOL TARTRATE 50 MG/1
50 TABLET, FILM COATED ORAL 2 TIMES DAILY
Qty: 60 TABLET | Refills: 1 | Status: SHIPPED | OUTPATIENT
Start: 2021-09-02 | End: 2021-10-01

## 2021-09-02 NOTE — TELEPHONE ENCOUNTER
Requested Prescriptions     Pending Prescriptions Disp Refills    metoprolol tartrate (LOPRESSOR) 50 MG tablet 60 tablet 1     Sig: Take 1 tablet by mouth 2 times daily            Last Office Visit: 8/17/2021     Next Office Visit: 2/16/2022

## 2021-10-01 ENCOUNTER — TELEPHONE (OUTPATIENT)
Dept: CARDIOLOGY CLINIC | Age: 46
End: 2021-10-01

## 2021-10-01 RX ORDER — METOPROLOL TARTRATE 50 MG/1
TABLET, FILM COATED ORAL
Qty: 180 TABLET | Refills: 3 | Status: SHIPPED | OUTPATIENT
Start: 2021-10-01 | End: 2022-03-25 | Stop reason: SDUPTHER

## 2021-10-01 NOTE — TELEPHONE ENCOUNTER
Requested Prescriptions     Pending Prescriptions Disp Refills    metoprolol tartrate (LOPRESSOR) 50 MG tablet [Pharmacy Med Name: METOPROLOL TARTRATE 50MG TABLETS] 180 tablet 3     Sig: TAKE 1 TABLET BY MOUTH TWICE DAILY                Last Office Visit: 8/17/2021     Next Office Visit: 2/16/2022

## 2021-10-01 NOTE — TELEPHONE ENCOUNTER
Patient called stating he is almost out of his prescription for ticagrelor (BRILINTA) 90 MG TABS tablet. Patient states the pharmacy will not fill the medication unless it's written for a  90 day supply. Patient takes medication 2x daily.  [facility] 8.17.21 next OV 2.16.22 labs done 7.5.21

## 2021-10-13 ENCOUNTER — OFFICE VISIT (OUTPATIENT)
Dept: FAMILY MEDICINE CLINIC | Age: 46
End: 2021-10-13
Payer: COMMERCIAL

## 2021-10-13 VITALS
SYSTOLIC BLOOD PRESSURE: 137 MMHG | HEIGHT: 67 IN | HEART RATE: 80 BPM | TEMPERATURE: 97.7 F | BODY MASS INDEX: 49.44 KG/M2 | OXYGEN SATURATION: 96 % | DIASTOLIC BLOOD PRESSURE: 89 MMHG | RESPIRATION RATE: 18 BRPM | WEIGHT: 315 LBS

## 2021-10-13 DIAGNOSIS — R79.89 ABNORMAL CBC: ICD-10-CM

## 2021-10-13 DIAGNOSIS — I10 BENIGN ESSENTIAL HTN: ICD-10-CM

## 2021-10-13 DIAGNOSIS — E55.9 VITAMIN D INSUFFICIENCY: ICD-10-CM

## 2021-10-13 DIAGNOSIS — E66.01 MORBID OBESITY WITH BMI OF 60.0-69.9, ADULT (HCC): ICD-10-CM

## 2021-10-13 DIAGNOSIS — E11.9 TYPE 2 DIABETES MELLITUS WITHOUT COMPLICATION, WITHOUT LONG-TERM CURRENT USE OF INSULIN (HCC): Primary | ICD-10-CM

## 2021-10-13 DIAGNOSIS — D64.9 ANEMIA, UNSPECIFIED TYPE: ICD-10-CM

## 2021-10-13 DIAGNOSIS — Z23 NEED FOR INFLUENZA VACCINATION: ICD-10-CM

## 2021-10-13 LAB
BASOPHILS ABSOLUTE: 0.1 K/UL (ref 0–0.2)
BASOPHILS RELATIVE PERCENT: 0.6 %
EOSINOPHILS ABSOLUTE: 0.2 K/UL (ref 0–0.6)
EOSINOPHILS RELATIVE PERCENT: 2.1 %
HCT VFR BLD CALC: 39.9 % (ref 40.5–52.5)
HEMOGLOBIN: 12.6 G/DL (ref 13.5–17.5)
LYMPHOCYTES ABSOLUTE: 1.6 K/UL (ref 1–5.1)
LYMPHOCYTES RELATIVE PERCENT: 14.8 %
MCH RBC QN AUTO: 25.5 PG (ref 26–34)
MCHC RBC AUTO-ENTMCNC: 31.6 G/DL (ref 31–36)
MCV RBC AUTO: 80.7 FL (ref 80–100)
MONOCYTES ABSOLUTE: 0.7 K/UL (ref 0–1.3)
MONOCYTES RELATIVE PERCENT: 6.8 %
NEUTROPHILS ABSOLUTE: 8.2 K/UL (ref 1.7–7.7)
NEUTROPHILS RELATIVE PERCENT: 75.7 %
PDW BLD-RTO: 18.4 % (ref 12.4–15.4)
PLATELET # BLD: 287 K/UL (ref 135–450)
PMV BLD AUTO: 8 FL (ref 5–10.5)
RBC # BLD: 4.95 M/UL (ref 4.2–5.9)
WBC # BLD: 10.8 K/UL (ref 4–11)

## 2021-10-13 PROCEDURE — 99214 OFFICE O/P EST MOD 30 MIN: CPT | Performed by: FAMILY MEDICINE

## 2021-10-13 PROCEDURE — 90756 CCIIV4 VACC ABX FREE IM: CPT | Performed by: FAMILY MEDICINE

## 2021-10-13 PROCEDURE — 90471 IMMUNIZATION ADMIN: CPT | Performed by: FAMILY MEDICINE

## 2021-10-13 RX ORDER — LIRAGLUTIDE 6 MG/ML
0.6 INJECTION SUBCUTANEOUS DAILY
Qty: 2 PEN | Refills: 3 | Status: SHIPPED | OUTPATIENT
Start: 2021-10-13 | End: 2022-01-11

## 2021-10-13 SDOH — ECONOMIC STABILITY: FOOD INSECURITY: WITHIN THE PAST 12 MONTHS, THE FOOD YOU BOUGHT JUST DIDN'T LAST AND YOU DIDN'T HAVE MONEY TO GET MORE.: NEVER TRUE

## 2021-10-13 SDOH — ECONOMIC STABILITY: FOOD INSECURITY: WITHIN THE PAST 12 MONTHS, YOU WORRIED THAT YOUR FOOD WOULD RUN OUT BEFORE YOU GOT MONEY TO BUY MORE.: NEVER TRUE

## 2021-10-13 ASSESSMENT — ENCOUNTER SYMPTOMS
SHORTNESS OF BREATH: 0
BLURRED VISION: 0
VISUAL CHANGE: 0

## 2021-10-13 ASSESSMENT — SOCIAL DETERMINANTS OF HEALTH (SDOH): HOW HARD IS IT FOR YOU TO PAY FOR THE VERY BASICS LIKE FOOD, HOUSING, MEDICAL CARE, AND HEATING?: NOT HARD AT ALL

## 2021-10-13 NOTE — PROGRESS NOTES
Subjective:      Patient ID: Norm Alvarez is a 40 y.o. male. Diabetes  He presents for his follow-up diabetic visit. He has type 2 diabetes mellitus. His disease course has been stable. Associated symptoms include foot paresthesias. Pertinent negatives for diabetes include no blurred vision, no chest pain, no fatigue, no foot ulcerations, no polydipsia, no polyphagia, no polyuria, no visual change, no weakness and no weight loss. There are no hypoglycemic complications. Symptoms are stable. Diabetic complications include heart disease and peripheral neuropathy. Pertinent negatives for diabetic complications include no nephropathy. Current diabetic treatment includes oral agent (monotherapy). His weight is increasing steadily. He is following a generally healthy diet. He has had a previous visit with a dietitian. He never participates in exercise. An ACE inhibitor/angiotensin II receptor blocker is not being taken. Hypertension  This is a chronic problem. The current episode started more than 1 year ago. The problem is unchanged. The problem is controlled. Associated symptoms include peripheral edema. Pertinent negatives include no anxiety, blurred vision, chest pain, malaise/fatigue, palpitations or shortness of breath. Past treatments include beta blockers, angiotensin blockers and diuretics. The current treatment provides significant improvement. Identifiable causes of hypertension include sleep apnea. Other  This is a chronic (anemia, vit D def , ) problem. The problem has been unchanged. Pertinent negatives include no chest pain, fatigue, visual change or weakness. Nothing aggravates the symptoms. Treatments tried: vit D 2000 units,      Obesity:   Stopped pop, no fast food,   Exercise? No regularly      Review of Systems   Constitutional: Negative for fatigue, malaise/fatigue and weight loss. Eyes: Negative for blurred vision. Respiratory: Negative for shortness of breath.     Cardiovascular: Negative for chest pain and palpitations. Endocrine: Negative for polydipsia, polyphagia and polyuria. Neurological: Negative for weakness. has No Known Allergies. Current Outpatient Medications:     Liraglutide (VICTOZA) 18 MG/3ML SOPN SC injection, Inject 0.6 mg into the skin daily, Disp: 2 pen, Rfl: 3    buPROPion (ZYBAN) 150 MG extended release tablet, TAKE 1 TABLET TWICE A DAY, Disp: 180 tablet, Rfl: 3    ticagrelor (BRILINTA) 90 MG TABS tablet, Take 1 tablet by mouth 2 times daily, Disp: 180 tablet, Rfl: 3    metoprolol tartrate (LOPRESSOR) 50 MG tablet, TAKE 1 TABLET BY MOUTH TWICE DAILY, Disp: 180 tablet, Rfl: 3    atorvastatin (LIPITOR) 40 MG tablet, Take 1 tablet by mouth nightly, Disp: 90 tablet, Rfl: 3    aspirin 81 MG EC tablet, Take 1 tablet by mouth daily, Disp: 30 tablet, Rfl: 5    losartan (COZAAR) 25 MG tablet, Take 1 tablet by mouth daily, Disp: 30 tablet, Rfl: 5    furosemide (LASIX) 20 MG tablet, Take 1 tablet by mouth daily, Disp: 90 tablet, Rfl: 3    nitroGLYCERIN (NITROSTAT) 0.4 MG SL tablet, up to max of 3 total doses.  If no relief after 1 dose, call 911., Disp: 25 tablet, Rfl: 3    sertraline (ZOLOFT) 50 MG tablet, TAKE ONE TABLET BY MOUTH DAILY, Disp: 90 tablet, Rfl: 5    allopurinol (ZYLOPRIM) 100 MG tablet, Take 1 tablet by mouth daily, Disp: 90 tablet, Rfl: 1    Potassium Gluconate 595 (99 K) MG TABS, Take by mouth, Disp: , Rfl:     Cholecalciferol (VITAMIN D) 50 MCG (2000 UT) CAPS capsule, One po qd, Disp: 90 capsule, Rfl: 1    Ascorbic Acid (VITAMIN C) 250 MG tablet, Take 250 mg by mouth daily, Disp: , Rfl:      has a past medical history of Abdominal hernia, Alcohol abuse, Anxiety, CAD S/P percutaneous coronary angioplasty, Clostridium difficile diarrhea, Clostridium difficile diarrhea, DVT (deep venous thrombosis) (HCC), DVT of axillary vein, acute left (HCC), Gout, H/O ulcer disease, Hernia, History of blood transfusion, Hyperlipemia, Hypertension, edema    Pulmonary:      Effort: Pulmonary effort is normal. No respiratory distress. Breath sounds: Normal breath sounds. No stridor. No wheezing, rhonchi or rales. Chest:      Chest wall: No tenderness. Musculoskeletal:      Cervical back: Normal range of motion and neck supple. Right lower leg: No edema. Left lower leg: No edema. Comments: Varicose veins     Lymphadenopathy:      Cervical: No cervical adenopathy. Skin:     General: Skin is warm. Capillary Refill: Capillary refill takes less than 2 seconds. Coloration: Skin is not pale. Findings: No erythema or rash. Neurological:      General: No focal deficit present. Mental Status: He is alert and oriented to person, place, and time. Mental status is at baseline. Cranial Nerves: No cranial nerve deficit. Motor: No weakness. Coordination: Coordination normal.   Psychiatric:         Mood and Affect: Mood normal.         Behavior: Behavior normal.         Thought Content: Thought content normal.         Assessment:       Diagnosis Orders   1. Type 2 diabetes mellitus without complication, without long-term current use of insulin ([facility])  Comprehensive Metabolic Panel    Hemoglobin A1C    Microalbumin / Creatinine Urine Ratio     DIABETES FOOT EXAM    Liraglutide (VICTOZA) 18 MG/3ML SOPN SC injection   2. Benign essential HTN  TSH without Reflex   3. Need for influenza vaccination  INFLUENZA, MDCK QUADV, 2 YRS AND OLDER, IM, MDV, 0.5ML (FLUCELVAX QUADV)   4. Abnormal CBC  CBC Auto Differential   5. Anemia, unspecified type  CBC Auto Differential    Iron and TIBC    Ferritin   6. Vitamin D insufficiency  Vitamin D 25 Hydroxy   7. Morbid obesity with BMI of 60.0-69.9, adult ([facility]             Plan:      1. I think he will benefit from liraglutide (help wt loss, dm , reduce cv risk)  If ins approves it stop metformin  Education on medication /sec effects discussed  Fu 3 mo    2.  At goal   Continue same medications no changes needed at this time     3. Flu vaccine  . Pt left office with vss, walking, no obvious sec effect form injection. 4. ACD? Check labs including iron/tib and ferritin    5. Check vit D,   Taking vit D 2000 units    7.  Counseling, and med tx discussed today           Merrill Pop MD

## 2021-10-14 LAB
A/G RATIO: 1.8 (ref 1.1–2.2)
ALBUMIN SERPL-MCNC: 4.2 G/DL (ref 3.4–5)
ALP BLD-CCNC: 139 U/L (ref 40–129)
ALT SERPL-CCNC: 31 U/L (ref 10–40)
ANION GAP SERPL CALCULATED.3IONS-SCNC: 17 MMOL/L (ref 3–16)
AST SERPL-CCNC: 21 U/L (ref 15–37)
BILIRUB SERPL-MCNC: 0.3 MG/DL (ref 0–1)
BUN BLDV-MCNC: 21 MG/DL (ref 7–20)
CALCIUM SERPL-MCNC: 9.6 MG/DL (ref 8.3–10.6)
CHLORIDE BLD-SCNC: 103 MMOL/L (ref 99–110)
CO2: 22 MMOL/L (ref 21–32)
CREAT SERPL-MCNC: 1.1 MG/DL (ref 0.9–1.3)
ESTIMATED AVERAGE GLUCOSE: 125.5 MG/DL
FERRITIN: 31 NG/ML (ref 30–400)
GFR AFRICAN AMERICAN: >60
GFR NON-AFRICAN AMERICAN: >60
GLOBULIN: 2.3 G/DL
GLUCOSE BLD-MCNC: 113 MG/DL (ref 70–99)
HBA1C MFR BLD: 6 %
IRON SATURATION: 13 % (ref 20–50)
IRON: 49 UG/DL (ref 59–158)
POTASSIUM SERPL-SCNC: 4.4 MMOL/L (ref 3.5–5.1)
SODIUM BLD-SCNC: 142 MMOL/L (ref 136–145)
TOTAL IRON BINDING CAPACITY: 392 UG/DL (ref 260–445)
TOTAL PROTEIN: 6.5 G/DL (ref 6.4–8.2)
TSH SERPL DL<=0.05 MIU/L-ACNC: 3.63 UIU/ML (ref 0.27–4.2)
VITAMIN D 25-HYDROXY: 45.6 NG/ML

## 2021-11-10 DIAGNOSIS — I10 ESSENTIAL HYPERTENSION: Chronic | ICD-10-CM

## 2021-11-10 RX ORDER — LOSARTAN POTASSIUM AND HYDROCHLOROTHIAZIDE 25; 100 MG/1; MG/1
TABLET ORAL
Qty: 90 TABLET | Refills: 1 | Status: SHIPPED | OUTPATIENT
Start: 2021-11-10 | End: 2022-02-24 | Stop reason: SDUPTHER

## 2021-11-25 DIAGNOSIS — M1A.9XX0 CHRONIC GOUT INVOLVING TOE WITHOUT TOPHUS, UNSPECIFIED CAUSE, UNSPECIFIED LATERALITY: ICD-10-CM

## 2021-11-29 RX ORDER — ALLOPURINOL 100 MG/1
100 TABLET ORAL DAILY
Qty: 90 TABLET | Refills: 1 | Status: SHIPPED | OUTPATIENT
Start: 2021-11-29 | End: 2022-05-31

## 2021-11-29 NOTE — TELEPHONE ENCOUNTER
Medication:   Requested Prescriptions     Pending Prescriptions Disp Refills    allopurinol (ZYLOPRIM) 100 MG tablet [Pharmacy Med Name: ALLOPURINOL 100MG TABLETS] 90 tablet 1     Sig: TAKE 1 TABLET BY MOUTH DAILY             Patient Phone Number: [phone] (home)     Last appt: 10/13/2021

## 2022-02-20 ENCOUNTER — TELEPHONE (OUTPATIENT)
Dept: CARDIOLOGY CLINIC | Age: 47
End: 2022-02-20

## 2022-02-21 NOTE — TELEPHONE ENCOUNTER
Requested Prescriptions     Pending Prescriptions Disp Refills    losartan (COZAAR) 25 MG tablet [Pharmacy Med Name: LOSARTAN 25MG TABLETS] 30 tablet 5     Sig: TAKE 1 TABLET BY MOUTH DAILY          Last Office Visit: 8/17/2021     Next Office Visit: 3/25/22

## 2022-02-23 DIAGNOSIS — I10 ESSENTIAL HYPERTENSION: Chronic | ICD-10-CM

## 2022-02-23 RX ORDER — LOSARTAN POTASSIUM 25 MG/1
25 TABLET ORAL DAILY
Qty: 90 TABLET | Refills: 2 | Status: SHIPPED | OUTPATIENT
Start: 2022-02-23 | End: 2022-02-24 | Stop reason: SDUPTHER

## 2022-02-23 RX ORDER — LOSARTAN POTASSIUM AND HYDROCHLOROTHIAZIDE 25; 100 MG/1; MG/1
TABLET ORAL
Qty: 90 TABLET | Refills: 1 | Status: CANCELLED | OUTPATIENT
Start: 2022-02-23

## 2022-02-23 NOTE — TELEPHONE ENCOUNTER
Pt called to follow up on his refill of losartan. Dr. Darlyn Ramsay is out this week. He would like 90-day supply.

## 2022-02-24 RX ORDER — LOSARTAN POTASSIUM 25 MG/1
25 TABLET ORAL DAILY
Qty: 90 TABLET | Refills: 3 | Status: SHIPPED | OUTPATIENT
Start: 2022-02-24 | End: 2022-10-14

## 2022-03-25 ENCOUNTER — OFFICE VISIT (OUTPATIENT)
Dept: CARDIOLOGY CLINIC | Age: 47
End: 2022-03-25
Payer: COMMERCIAL

## 2022-03-25 VITALS
SYSTOLIC BLOOD PRESSURE: 140 MMHG | WEIGHT: 315 LBS | BODY MASS INDEX: 65.91 KG/M2 | DIASTOLIC BLOOD PRESSURE: 66 MMHG | HEART RATE: 80 BPM

## 2022-03-25 DIAGNOSIS — I25.10 CAD S/P PERCUTANEOUS CORONARY ANGIOPLASTY: ICD-10-CM

## 2022-03-25 DIAGNOSIS — I50.32 CHRONIC HEART FAILURE WITH PRESERVED EJECTION FRACTION (HCC): Primary | ICD-10-CM

## 2022-03-25 DIAGNOSIS — Z98.61 CAD S/P PERCUTANEOUS CORONARY ANGIOPLASTY: ICD-10-CM

## 2022-03-25 PROCEDURE — 99214 OFFICE O/P EST MOD 30 MIN: CPT | Performed by: INTERNAL MEDICINE

## 2022-03-25 RX ORDER — FUROSEMIDE 20 MG/1
20 TABLET ORAL DAILY
Qty: 90 TABLET | Refills: 3 | Status: SHIPPED | OUTPATIENT
Start: 2022-03-25

## 2022-03-25 RX ORDER — METOPROLOL TARTRATE 50 MG/1
TABLET, FILM COATED ORAL
Qty: 180 TABLET | Refills: 3 | Status: SHIPPED | OUTPATIENT
Start: 2022-03-25 | End: 2022-11-01

## 2022-03-25 NOTE — PROGRESS NOTES
Cc: HFpEF, CAD/NSTEMI s/p stents    HPI:     Radha Zavaleta is a 40 y. o. male with a past medical history of morbid obesity (BMI 64), borderline DM, HTN, borderline HLP, past smoker (quit 6 to 7 years ago), CAD s/p NSTEMI s/p .     Patient was admitted at the [facility] hospital 7/3-7/5/2021 with NSTEMI (peak 0.2). [facility] had a PCI.     LHC 7/3/2021: Diagonal 100% s/p DARLINE, OM 90% status post DARLINE, distal RCA 90% status post DARLINE, LVEDP 25 mmHg.     Echo 07/2021: normal     FLP 7/4/21: , HDL 34, LDL 67,  off statin.      Patient is here for follow-up. He reports no complaints. His weight is going up (up 20 lbs since 08/2021). BP at home is normal.       Histories     Past Medical History:   has a past medical history of Abdominal hernia, Alcohol abuse, Anxiety, CAD S/P percutaneous coronary angioplasty, Clostridium difficile diarrhea, Clostridium difficile diarrhea, DVT (deep venous thrombosis) ([address] DVT of axillary vein, acute left ([address] Gout, H/O ulcer disease, Hernia, History of blood transfusion, Hyperlipemia, Hypertension, Hypertension, essential, Kidney congenitally absent, left, Kidney disease, Lightheaded, Obesity, Obstructive sleep apnea (adult) (pediatric), Renal agenesis, and Severe single current episode of major depressive disorder, without psychotic features ([address] Surgical History:   has a past surgical history that includes hernia repair; ventral hernia repair (3/23/15); other surgical history (4/20/2015); ventral hernia repair (6/2/16); and Tonsillectomy and adenoidectomy. Social History:   reports that he quit smoking about 4 years ago. He has a 2.00 pack-year smoking history. He has never used smokeless tobacco. He reports that he does not drink alcohol and does not use drugs.      Family History:  No evidence for sudden cardiac death or premature CAD      Medications:     Home medications were reviewed and are listed below    Prior to Admission medications    Medication Sig Start Date End Date Taking? Authorizing Provider   ticagrelor (BRILINTA) 90 MG TABS tablet Take 1 tablet by mouth 2 times daily 3/25/22  Yes Laney Hussein MD   metoprolol tartrate (LOPRESSOR) 50 MG tablet TAKE 1 TABLET BY MOUTH TWICE DAILY 3/25/22  Yes Laney Hussein MD   furosemide (LASIX) 20 MG tablet Take 1 tablet by mouth daily 3/25/22  Yes Laney Hussein MD   losartan (COZAAR) 25 MG tablet TAKE 1 TABLET BY MOUTH DAILY 2/24/22  Yes Laney Hussein MD   allopurinol (ZYLOPRIM) 100 MG tablet TAKE 1 TABLET BY MOUTH DAILY 11/29/21  Yes Amina Navas MD   buPROPion (ZYBAN) 150 MG extended release tablet TAKE 1 TABLET TWICE A DAY 10/11/21  Yes Amina Navas MD   atorvastatin (LIPITOR) 40 MG tablet Take 1 tablet by mouth nightly 8/31/21  Yes Laney Hussein MD   aspirin 81 MG EC tablet Take 1 tablet by mouth daily 8/27/21  Yes Laney Hussein MD   nitroGLYCERIN (NITROSTAT) 0.4 MG SL tablet up to max of 3 total doses. If no relief after 1 dose, call 911. 7/5/21  Yes Digna Delgado MD   sertraline (ZOLOFT) 50 MG tablet TAKE ONE TABLET BY MOUTH DAILY 5/21/21  Yes Amina Navas MD   Potassium Gluconate 595 (99 K) MG TABS Take by mouth   Yes Historical Provider, MD   Cholecalciferol (VITAMIN D) 50 MCG (2000 UT) CAPS capsule One po qd 11/19/19  Yes Amina Navas MD   Ascorbic Acid (VITAMIN C) 250 MG tablet Take 250 mg by mouth daily   Yes Historical Provider, MD   Liraglutide (VICTOZA) 18 MG/3ML SOPN SC injection Inject 0.6 mg into the skin daily 10/13/21 1/11/22  Amina Navas MD          Allergy:     Patient has no known allergies. Review of Systems:     All 12 point review of symptoms completed. Pertinent positives identified in the HPI, all other review of symptoms negative as below. CONSTITUTIONAL: No fatigue  SKIN: No rash or pruritis. EYES: No visual changes or diplopia. No scleral icterus. ENT: No Headaches, hearing loss or vertigo. No mouth sores or sore throat.   CARDIOVASCULAR: No chest pain/chest pressure/chest discomfort. No palpitations. No edema. RESPIRATORY: No dyspnea. No cough or wheezing, no sputum production. GASTROINTESTINAL: No N/V/D. No abdominal pain, appetite loss, blood in stools. GENITOURINARY: No dysuria, trouble voiding, or hematuria. MUSCULOSKELETAL:  No gait disturbance, weakness or joint complaints. NEUROLOGICAL: No headache, diplopia, change in muscle strength, numbness or tingling. No change in gait, balance, coordination, mood, affect, memory, mentation, behavior. PSHYCH: No anxiety, loss of interest, change in sexual behavior, feelings of self-harm, or confusion. ENDOCRINE: No excessive thirst, fluid intake, or urination. No tremor. HEMATOLOGIC: No abnormal bruising or bleeding. ALLERGY: No nasal congestion or hives.       Physical Examination:     Vitals:    03/25/22 1339 03/25/22 1349   BP: (!) 140/66 (!) 140/66   Pulse: 80    Weight: (!) 420 lb 12.8 oz (190.9 kg)        Wt Readings from Last 3 Encounters:   03/25/22 (!) 420 lb 12.8 oz (190.9 kg)   11/03/21 (!) 404 lb 14.4 oz (183.7 kg)   10/13/21 (!) 400 lb 1.6 oz (181.5 kg)         General Appearance:  Alert, cooperative, no distress, appears stated age Appropriate weight   Head:  Normocephalic, without obvious abnormality, atraumatic   Eyes:  PERRL, conjunctiva/corneas clear EOM intact  Ears normal   Throat no lesions       Nose: Nares normal, no drainage or sinus tenderness   Throat: Lips, mucosa, and tongue normal   Neck: Supple, symmetrical, trachea midline, no adenopathy, thyroid: not enlarged, symmetric, no tenderness/mass/nodules, no carotid bruit       Lungs:   Clear to auscultation bilaterally, respirations unlabored   Chest Wall:  No tenderness or deformity   Heart:  Regular rhythm, rate is controlled, S1, S2 normal, there is no murmur, there is no rub or gallop, cannot assess jvd, min bilateral lower extremity edema   Abdomen:   Soft, non-tender, bowel sounds active all four quadrants,  no masses, no organomegaly       Extremities: Extremities normal, atraumatic, no cyanosis   Pulses: 2+ and symmetric   Skin: Skin color, texture, turgor normal, no rashes or lesions   Pysch: Normal mood and affect   Neurologic: Normal gross motor and sensory exam.  Cranial nerves intact        Labs:     Lab Results   Component Value Date    WBC 10.8 10/13/2021    HGB 12.6 (L) 10/13/2021    HCT 39.9 (L) 10/13/2021    MCV 80.7 10/13/2021     10/13/2021     Lab Results   Component Value Date     10/13/2021    K 4.4 10/13/2021     10/13/2021    CO2 22 10/13/2021    BUN 21 (H) 10/13/2021    CREATININE 1.1 10/13/2021    GLUCOSE 113 (H) 10/13/2021    CALCIUM 9.6 10/13/2021    PROT 6.5 10/13/2021    LABALBU 4.2 10/13/2021    BILITOT 0.3 10/13/2021    ALKPHOS 139 (H) 10/13/2021    AST 21 10/13/2021    ALT 31 10/13/2021    LABGLOM >60 10/13/2021    GFRAA >60 10/13/2021    AGRATIO 1.8 10/13/2021    GLOB 2.3 10/13/2021         Lab Results   Component Value Date    CHOL 130 07/04/2021    CHOL 160 03/02/2021    CHOL 161 10/28/2020     Lab Results   Component Value Date    TRIG 144 07/04/2021    TRIG 155 (H) 03/02/2021    TRIG 120 10/28/2020     Lab Results   Component Value Date    HDL 34 (L) 07/04/2021    HDL 48 03/02/2021    HDL 50 10/28/2020     Lab Results   Component Value Date    LDLCALC 67 07/04/2021    LDLCALC 81 03/02/2021    LDLCALC 87 10/28/2020     Lab Results   Component Value Date    LABVLDL 29 07/04/2021    LABVLDL 31 03/02/2021    LABVLDL 24 10/28/2020     Lab Results   Component Value Date    CHOLHDLRATIO 4.0 02/04/2014    CHOLHDLRATIO 4.7 01/04/2013       Lab Results   Component Value Date    INR 0.94 07/03/2021    INR 0.98 06/17/2019    INR 2.26 (H) 08/31/2015    PROTIME 10.6 07/03/2021    PROTIME 11.2 06/17/2019    PROTIME 25.5 (H) 08/31/2015       The ASCVD Risk score (Yessi Maldonado, et al., 2013) failed to calculate for the following reasons:     The patient has a prior MI or stroke diagnosis      Assessment / Plan:      Diagnosis Orders   1. Chronic heart failure with preserved ejection fraction ([address]     2. CAD S/P percutaneous coronary angioplasty          1.  CAD with NSTEMI s/p PCI:  Patient has no concerning symptoms.  His compliant with his medications.     -Continue with aspirin 81 daily, Lipitor 40 daily, Brilinta, Lopressor 50 twice daily     2.  Essential hypertension:  BP is well controlled with current medications.     -Continue with losartan 25 daily, Lopressor 50 twice daily.     3.  Hyperlipidemia:  Recent lipid profile was reviewed, LDL 80s, off statin.     -Agree with Lipitor 40 daily.     4.  Chronic HFpEF:  Patient appears euvolemic and hemo stable.      -I highly recommended low-salt diet (goal sodium less than 2000 mg/day)  -Daily weights and frequent BP checks  -Cw Lasix 20 mg daily        Return in about 6 months (around 9/25/2022). I have spent 35 minutes of face to face time with the patient with more than 50% spent counseling and coordinating care. I have personally reviewed the reports and images of labs, radiological studies, cardiac studies including ECG's and telemetry, current and old medical records. The note was completed using EMR and Dragon dictation system. Every effort was made to ensure accuracy; however, inadvertent computerized transcription errors may be present. All questions and concerns were addressed to the patient/family. Alternatives to my treatment were discussed. I would like to thank you for providing me the opportunity to participate in the care of your patient. If you have any questions, please do not hesitate to contact me.      Urvashi Mancia MD, [facility] - [address] Office Phone: [phone]  Fax: [phone]

## 2022-04-01 ENCOUNTER — TELEMEDICINE (OUTPATIENT)
Dept: FAMILY MEDICINE CLINIC | Age: 47
End: 2022-04-01
Payer: COMMERCIAL

## 2022-04-01 ENCOUNTER — TELEPHONE (OUTPATIENT)
Dept: FAMILY MEDICINE CLINIC | Age: 47
End: 2022-04-01

## 2022-04-01 DIAGNOSIS — R11.0 NAUSEA: Primary | ICD-10-CM

## 2022-04-01 PROCEDURE — 99213 OFFICE O/P EST LOW 20 MIN: CPT | Performed by: FAMILY MEDICINE

## 2022-04-01 RX ORDER — ONDANSETRON 4 MG/1
4 TABLET, ORALLY DISINTEGRATING ORAL 3 TIMES DAILY PRN
Qty: 21 TABLET | Refills: 0 | Status: SHIPPED | OUTPATIENT
Start: 2022-04-01

## 2022-04-01 ASSESSMENT — PATIENT HEALTH QUESTIONNAIRE - PHQ9
10. IF YOU CHECKED OFF ANY PROBLEMS, HOW DIFFICULT HAVE THESE PROBLEMS MADE IT FOR YOU TO DO YOUR WORK, TAKE CARE OF THINGS AT HOME, OR GET ALONG WITH OTHER PEOPLE: 0
SUM OF ALL RESPONSES TO PHQ QUESTIONS 1-9: 0
5. POOR APPETITE OR OVEREATING: 0
8. MOVING OR SPEAKING SO SLOWLY THAT OTHER PEOPLE COULD HAVE NOTICED. OR THE OPPOSITE, BEING SO FIGETY OR RESTLESS THAT YOU HAVE BEEN MOVING AROUND A LOT MORE THAN USUAL: 0
7. TROUBLE CONCENTRATING ON THINGS, SUCH AS READING THE NEWSPAPER OR WATCHING TELEVISION: 0
4. FEELING TIRED OR HAVING LITTLE ENERGY: 0
SUM OF ALL RESPONSES TO PHQ QUESTIONS 1-9: 0
6. FEELING BAD ABOUT YOURSELF - OR THAT YOU ARE A FAILURE OR HAVE LET YOURSELF OR YOUR FAMILY DOWN: 0
SUM OF ALL RESPONSES TO PHQ9 QUESTIONS 1 & 2: 0
SUM OF ALL RESPONSES TO PHQ QUESTIONS 1-9: 0
3. TROUBLE FALLING OR STAYING ASLEEP: 0
9. THOUGHTS THAT YOU WOULD BE BETTER OFF DEAD, OR OF HURTING YOURSELF: 0
SUM OF ALL RESPONSES TO PHQ QUESTIONS 1-9: 0
1. LITTLE INTEREST OR PLEASURE IN DOING THINGS: 0
2. FEELING DOWN, DEPRESSED OR HOPELESS: 0

## 2022-04-01 ASSESSMENT — ENCOUNTER SYMPTOMS
VOMITING: 0
CHANGE IN BOWEL HABIT: 0
SORE THROAT: 0
NAUSEA: 1

## 2022-04-01 NOTE — PROGRESS NOTES
Subjective:      Marlyn Sanches, was evaluated through a synchronous (real-time) audio-video encounter. The patient (or guardian if applicable) is aware that this is a billable service, which includes applicable co-pays. This Virtual Visit was conducted with patient's (and/or legal guardian's) consent. The visit was conducted pursuant to the emergency declaration under the [address] [place] [address] and the [employer] and @Pay General Act. Patient identification was verified, and a caregiver was present when appropriate. The patient was located at home in a state where the provider was licensed to provide care. Total time spent for this encounter: Not billed by time    --Efraín Alfaro MD on 4/1/2022 at 3:11 PM    An electronic signature was used to authenticate this note. Patient ID: Marlyn Sanches is a 40 y.o. male. Nausea & Vomiting  This is a new problem. The current episode started yesterday. The problem occurs constantly. The problem has been unchanged. Associated symptoms include nausea. Pertinent negatives include no change in bowel habit, chills, fatigue, sore throat or vomiting. Nothing aggravates the symptoms. He has tried acetaminophen for the symptoms. Review of Systems   Constitutional: Negative for chills and fatigue. HENT: Negative for sore throat. Gastrointestinal: Positive for nausea. Negative for change in bowel habit and vomiting. Objective:   Physical Exam  Neurological:      Mental Status: He is alert and oriented to person, place, and time. Psychiatric:         Mood and Affect: Mood normal.         Assessment:       Diagnosis Orders   1.  Nausea  ondansetron (ZOFRAN-ODT) 4 MG disintegrating tablet     no sx of severe abd pain, fever, chills or changes in his bm       Plan:      BRAT diet, no sx of severe abd pain, fever, chills or changes in his bm   tx : zofran otd prn Patient to call if symptoms persist or worsen.            Roshan Carty MD

## 2022-04-01 NOTE — TELEPHONE ENCOUNTER
----- Message from Cesartrell eMnezes sent at 4/1/2022 11:58 AM EDT -----  Subject: Message to Provider    QUESTIONS  Information for Provider? Patient has been having nausea for 2 days and   over the counter meds are not helping. Could Dr. Liseth Jeronimo send in a   prescription? Please reach out to patient.  ---------------------------------------------------------------------------  --------------  CALL BACK INFO  What is the best way for the office to contact you? OK to leave message on   voicemail  Preferred Call Back Phone Number? [phone]  ---------------------------------------------------------------------------  --------------  SCRIPT ANSWERS  Relationship to Patient?  Self

## 2022-05-05 ENCOUNTER — TELEPHONE (OUTPATIENT)
Dept: FAMILY MEDICINE CLINIC | Age: 47
End: 2022-05-05

## 2022-05-05 NOTE — TELEPHONE ENCOUNTER
Pt needs to  his FMLA forms. There is 1 question that still needs answered. Form is in sign bin.     Please call pt at [phone] when complete

## 2022-05-29 DIAGNOSIS — M1A.9XX0 CHRONIC GOUT INVOLVING TOE WITHOUT TOPHUS, UNSPECIFIED CAUSE, UNSPECIFIED LATERALITY: ICD-10-CM

## 2022-05-31 RX ORDER — ALLOPURINOL 100 MG/1
100 TABLET ORAL DAILY
Qty: 90 TABLET | Refills: 1 | Status: SHIPPED | OUTPATIENT
Start: 2022-05-31

## 2022-05-31 NOTE — TELEPHONE ENCOUNTER
Medication:   Requested Prescriptions     Pending Prescriptions Disp Refills    allopurinol (ZYLOPRIM) 100 MG tablet [Pharmacy Med Name: ALLOPURINOL 100MG TABLETS] 90 tablet 1     Sig: TAKE 1 TABLET BY MOUTH DAILY        Last Filled:      Patient Phone Number: [phone] (home)     Last appt: 4/1/2022   Next appt: Visit date not found    Last OARRS:   RX Monitoring 5/3/2019   Attestation The Prescription Monitoring Report for this patient was reviewed today. Periodic Controlled Substance Monitoring Possible medication side effects, risk of tolerance/dependence & alternative treatments discussed. ;No signs of potential drug abuse or diversion identified: otherwise, see note documentation

## 2022-06-07 ENCOUNTER — TELEPHONE (OUTPATIENT)
Dept: FAMILY MEDICINE CLINIC | Age: 47
End: 2022-06-07

## 2022-06-07 NOTE — TELEPHONE ENCOUNTER
rx transmitted electronically to the pharmacy via "[employer]   Let patient know and verify pharmacy

## 2022-06-07 NOTE — TELEPHONE ENCOUNTER
Pt called requesting metformin to be refilled. He states he just took him last pill.  Please advise  Last OV:  4/1/2022

## 2022-06-19 DIAGNOSIS — F41.8 DEPRESSION WITH ANXIETY: ICD-10-CM

## 2022-06-20 NOTE — TELEPHONE ENCOUNTER
Medication:   Requested Prescriptions     Pending Prescriptions Disp Refills    buPROPion (ZYBAN) 150 MG extended release tablet [Pharmacy Med Name: BUPROPION ER 150MG [facility])] 180 tablet 3     Sig: TAKE 1 TABLET BY MOUTH TWICE DAILY        Last Filled:      Patient Phone Number: [phone] (home)     Last appt: 4/1/2022   Next appt: Visit date not found    Last OARRS:   RX Monitoring 5/3/2019   Attestation The Prescription Monitoring Report for this patient was reviewed today. Periodic Controlled Substance Monitoring Possible medication side effects, risk of tolerance/dependence & alternative treatments discussed. ;No signs of potential drug abuse or diversion identified: otherwise, see note documentation

## 2022-06-22 RX ORDER — BUPROPION HYDROCHLORIDE 150 MG/1
TABLET, EXTENDED RELEASE ORAL
Qty: 180 TABLET | Refills: 3 | Status: SHIPPED | OUTPATIENT
Start: 2022-06-22

## 2022-06-26 DIAGNOSIS — F41.8 DEPRESSION WITH ANXIETY: ICD-10-CM

## 2022-06-28 NOTE — TELEPHONE ENCOUNTER
Medication:   Requested Prescriptions     Pending Prescriptions Disp Refills    sertraline (ZOLOFT) 50 MG tablet [Pharmacy Med Name: SERTRALINE 50MG TABLETS] 90 tablet 5     Sig: TAKE 1 TABLET BY MOUTH DAILY        Last Filled:      Patient Phone Number: [phone] (home)     Last appt: 4/1/2022   Next appt: Visit date not found    Last OARRS:   RX Monitoring 5/3/2019   Attestation The Prescription Monitoring Report for this patient was reviewed today. Periodic Controlled Substance Monitoring Possible medication side effects, risk of tolerance/dependence & alternative treatments discussed. ;No signs of potential drug abuse or diversion identified: otherwise, see note documentation

## 2022-08-15 DIAGNOSIS — Z12.11 SCREENING FOR COLON CANCER: Primary | ICD-10-CM

## 2022-09-02 RX ORDER — ATORVASTATIN CALCIUM 40 MG/1
40 TABLET, FILM COATED ORAL NIGHTLY
Qty: 90 TABLET | Refills: 3 | Status: SHIPPED | OUTPATIENT
Start: 2022-09-02

## 2022-09-02 NOTE — TELEPHONE ENCOUNTER
Requested Prescriptions     Pending Prescriptions Disp Refills    atorvastatin (LIPITOR) 40 MG tablet [Pharmacy Med Name: ATORVASTATIN 40MG TABLETS] 90 tablet 3     Sig: TAKE 1 TABLET BY MOUTH NIGHTLY              Last Office Visit: 8/17/2021     Next Office Visit: [phone]

## 2022-09-21 ENCOUNTER — TELEPHONE (OUTPATIENT)
Dept: CARDIOLOGY CLINIC | Age: 47
End: 2022-09-21

## 2022-09-21 NOTE — TELEPHONE ENCOUNTER
The patient called stating he can not afford the medication listed below and he would like to know if he can be prescribed a more affordable alterative medication. Please call the patient at [phone] to advise.       ticagrelor (BRILINTA) 90 MG TABS tablet [[id number]]     Order Details  Dose: 90 mg Route: Oral Frequency: 2 TIMES DAILY   Dispense Quantity: 180 tablet Refills: 3          Sig: Take 1 tablet by mouth 2 times daily

## 2022-09-22 NOTE — TELEPHONE ENCOUNTER
Called patient again and no ans.   Called pharmacy and they think he has a commercial insurance but not [employer]

## 2022-10-14 ENCOUNTER — OFFICE VISIT (OUTPATIENT)
Dept: CARDIOLOGY CLINIC | Age: 47
End: 2022-10-14
Payer: COMMERCIAL

## 2022-10-14 VITALS
BODY MASS INDEX: 65.59 KG/M2 | DIASTOLIC BLOOD PRESSURE: 84 MMHG | WEIGHT: 315 LBS | SYSTOLIC BLOOD PRESSURE: 150 MMHG | HEART RATE: 91 BPM

## 2022-10-14 DIAGNOSIS — Z98.61 CAD S/P PERCUTANEOUS CORONARY ANGIOPLASTY: ICD-10-CM

## 2022-10-14 DIAGNOSIS — I25.10 CAD S/P PERCUTANEOUS CORONARY ANGIOPLASTY: ICD-10-CM

## 2022-10-14 DIAGNOSIS — I50.32 CHRONIC HEART FAILURE WITH PRESERVED EJECTION FRACTION (HCC): Primary | ICD-10-CM

## 2022-10-14 DIAGNOSIS — E78.2 MIXED HYPERLIPIDEMIA: ICD-10-CM

## 2022-10-14 PROCEDURE — 99214 OFFICE O/P EST MOD 30 MIN: CPT | Performed by: INTERNAL MEDICINE

## 2022-10-14 RX ORDER — LOSARTAN POTASSIUM 50 MG/1
50 TABLET ORAL DAILY
Qty: 90 TABLET | Refills: 3 | Status: SHIPPED | OUTPATIENT
Start: 2022-10-14

## 2022-10-14 NOTE — PROGRESS NOTES
Cc: HFpEF, CAD/NSTEMI s/p stents    HPI:     Prabha White is a 56 y.o. male with a past medical history of morbid obesity (BMI 59), borderline DM, HTN, borderline HLP, past smoker (quit 6 to 7 years ago), CAD s/p NSTEMI s/p . Patient was admitted at the [facility] hospital 7/3-7/5/2021 with NSTEMI (peak 0.2). He had a PCI. LHC 7/3/2021: Diagonal 100% s/p DARLINE, OM 90% status post DARLINE, distal RCA 90% status post DARLINE, LVEDP 25 mmHg. Echo 07/2021: normal     FLP 7/4/21: , HDL 34, LDL 67,  off statin. Patient was started on lipitor 40 daily. Patient is here for a follow up. He reports no complaints. His SBP at home 130-140s. Histories     Past Medical History:   has a past medical history of Abdominal hernia, Alcohol abuse, Anxiety, CAD S/P percutaneous coronary angioplasty, Clostridium difficile diarrhea, Clostridium difficile diarrhea, DVT (deep venous thrombosis) ([address] DVT of axillary vein, acute left ([address] Gout, H/O ulcer disease, Hernia, History of blood transfusion, Hyperlipemia, Hypertension, Hypertension, essential, Kidney congenitally absent, left, Kidney disease, Lightheaded, Obesity, Obstructive sleep apnea (adult) (pediatric), Renal agenesis, and Severe single current episode of major depressive disorder, without psychotic features ([address] Surgical History:   has a past surgical history that includes hernia repair; ventral hernia repair (3/23/15); other surgical history (4/20/2015); ventral hernia repair (6/2/16); and Tonsillectomy and adenoidectomy. Social History:   reports that he quit smoking about 5 years ago. His smoking use included cigarettes. He has a 2.00 pack-year smoking history. He has never used smokeless tobacco. He reports that he does not drink alcohol and does not use drugs.      Family History:  No evidence for sudden cardiac death or premature CAD      Medications:     Home medications were reviewed and are listed below    Prior to Admission medications    Medication Sig Start Date End Date Taking? Authorizing Provider   losartan (COZAAR) 50 MG tablet Take 1 tablet by mouth daily 10/14/22  Yes Modesto Hunter MD   atorvastatin (LIPITOR) 40 MG tablet TAKE 1 TABLET BY MOUTH NIGHTLY 9/2/22  Yes SELMA Baires - CNP   sertraline (ZOLOFT) 50 MG tablet TAKE 1 TABLET BY MOUTH DAILY 6/28/22  Yes SELMA Wynn CNP   buPROPion (ZYBAN) 150 MG extended release tablet TAKE 1 TABLET BY MOUTH TWICE DAILY 6/22/22  Yes Albaro Mcneil MD   metFORMIN (GLUCOPHAGE) 500 MG tablet TAKE ONE TABLET BY MOUTH ONCE DAILY WITH BREAKFAST 6/7/22  Yes Albaro Mcneil MD   allopurinol (ZYLOPRIM) 100 MG tablet TAKE 1 TABLET BY MOUTH DAILY 5/31/22  Yes Albaro Mcneil MD   ondansetron (ZOFRAN-ODT) 4 MG disintegrating tablet Take 1 tablet by mouth 3 times daily as needed for Nausea or Vomiting 4/1/22  Yes Albaro Mcneil MD   ticagrelor (BRILINTA) 90 MG TABS tablet Take 1 tablet by mouth 2 times daily 3/25/22  Yes Modesto Hunter MD   metoprolol tartrate (LOPRESSOR) 50 MG tablet TAKE 1 TABLET BY MOUTH TWICE DAILY 3/25/22  Yes Modesto Hunter MD   furosemide (LASIX) 20 MG tablet Take 1 tablet by mouth daily 3/25/22  Yes Modesto Hunter MD   aspirin 81 MG EC tablet Take 1 tablet by mouth daily 8/27/21  Yes Modesto Hunter MD   nitroGLYCERIN (NITROSTAT) 0.4 MG SL tablet up to max of 3 total doses. If no relief after 1 dose, call 911. 7/5/21  Yes Gil Mock MD   Potassium Gluconate 595 (99 K) MG TABS Take by mouth   Yes Historical Provider, MD   Cholecalciferol (VITAMIN D) 50 MCG (2000 UT) CAPS capsule One po qd 11/19/19  Yes Albaro Mcneil MD   Ascorbic Acid (VITAMIN C) 250 MG tablet Take 250 mg by mouth daily   Yes Historical Provider, MD   Liraglutide (VICTOZA) 18 MG/3ML SOPN SC injection Inject 0.6 mg into the skin daily 10/13/21 1/11/22  Albaro Mcneil MD          Allergy:     Patient has no known allergies.        Review of Systems:     All 12 point review of symptoms completed. Pertinent positives identified in the HPI, all other review of symptoms negative as below. CONSTITUTIONAL: No fatigue  SKIN: No rash or pruritis. EYES: No visual changes or diplopia. No scleral icterus. ENT: No Headaches, hearing loss or vertigo. No mouth sores or sore throat. CARDIOVASCULAR: No chest pain/chest pressure/chest discomfort. No palpitations. No edema. RESPIRATORY: No dyspnea. No cough or wheezing, no sputum production. GASTROINTESTINAL: No N/V/D. No abdominal pain, appetite loss, blood in stools. GENITOURINARY: No dysuria, trouble voiding, or hematuria. MUSCULOSKELETAL:  No gait disturbance, weakness or joint complaints. NEUROLOGICAL: No headache, diplopia, change in muscle strength, numbness or tingling. No change in gait, balance, coordination, mood, affect, memory, mentation, behavior. PSHYCH: No anxiety, loss of interest, change in sexual behavior, feelings of self-harm, or confusion. ENDOCRINE: No excessive thirst, fluid intake, or urination. No tremor. HEMATOLOGIC: No abnormal bruising or bleeding. ALLERGY: No nasal congestion or hives.       Physical Examination:     Vitals:    10/14/22 1455 10/14/22 1501   BP: (!) 158/88 (!) 150/84   Pulse: 91    Weight: (!) 418 lb 12.8 oz (190 kg)        Wt Readings from Last 3 Encounters:   10/14/22 (!) 418 lb 12.8 oz (190 kg)   03/25/22 (!) 420 lb 12.8 oz (190.9 kg)   11/03/21 (!) 404 lb 14.4 oz (183.7 kg)         General Appearance:  Alert, cooperative, no distress, appears stated age Appropriate weight   Head:  Normocephalic, without obvious abnormality, atraumatic   Eyes:  PERRL, conjunctiva/corneas clear EOM intact  Ears normal   Throat no lesions       Nose: Nares normal, no drainage or sinus tenderness   Throat: Lips, mucosa, and tongue normal   Neck: Supple, symmetrical, trachea midline, no adenopathy, thyroid: not enlarged, symmetric, no tenderness/mass/nodules, no carotid bruit       Lungs:   Clear to auscultation bilaterally, respirations unlabored   Chest Wall:  No tenderness or deformity   Heart:  Regular rhythm, rate is controlled, S1, S2 normal, there is no murmur, there is no rub or gallop, cannot assess jvd, no bilateral lower extremity edema   Abdomen:   Soft, non-tender, bowel sounds active all four quadrants,  no masses, no organomegaly       Extremities: Extremities normal, atraumatic, no cyanosis   Pulses: 2+ and symmetric   Skin: Skin color, texture, turgor normal, no rashes or lesions   Pysch: Normal mood and affect   Neurologic: Normal gross motor and sensory exam.  Cranial nerves intact        Labs:     Lab Results   Component Value Date    WBC 10.8 10/13/2021    HGB 12.6 (L) 10/13/2021    HCT 39.9 (L) 10/13/2021    MCV 80.7 10/13/2021     10/13/2021     Lab Results   Component Value Date     10/13/2021    K 4.4 10/13/2021     10/13/2021    CO2 22 10/13/2021    BUN 21 (H) 10/13/2021    CREATININE 1.1 10/13/2021    GLUCOSE 113 (H) 10/13/2021    CALCIUM 9.6 10/13/2021    PROT 6.5 10/13/2021    LABALBU 4.2 10/13/2021    BILITOT 0.3 10/13/2021    ALKPHOS 139 (H) 10/13/2021    AST 21 10/13/2021    ALT 31 10/13/2021    LABGLOM >60 10/13/2021    GFRAA >60 10/13/2021    AGRATIO 1.8 10/13/2021    GLOB 2.3 10/13/2021         Lab Results   Component Value Date    CHOL 130 07/04/2021    CHOL 160 03/02/2021    CHOL 161 10/28/2020     Lab Results   Component Value Date    TRIG 144 07/04/2021    TRIG 155 (H) 03/02/2021    TRIG 120 10/28/2020     Lab Results   Component Value Date    HDL 34 (L) 07/04/2021    HDL 48 03/02/2021    HDL 50 10/28/2020     Lab Results   Component Value Date    LDLCALC 67 07/04/2021    LDLCALC 81 03/02/2021    LDLCALC 87 10/28/2020     Lab Results   Component Value Date    LABVLDL 29 07/04/2021    LABVLDL 31 03/02/2021    LABVLDL 24 10/28/2020     Lab Results   Component Value Date    CHOLHDLRATIO 4.0 02/04/2014    CHOLHDLRATIO 4.7 01/04/2013       Lab Results   Component Value Date    INR 0.94 07/03/2021    INR 0.98 06/17/2019    INR 2.26 (H) 08/31/2015    PROTIME 10.6 07/03/2021    PROTIME 11.2 06/17/2019    PROTIME 25.5 (H) 08/31/2015       The ASCVD Risk score (Day GONZALEZ, et al., 2019) failed to calculate for the following reasons: The patient has a prior MI or stroke diagnosis      Assessment / Plan:      Diagnosis Orders   1. Chronic heart failure with preserved ejection fraction (HCC)  Basic Metabolic Panel    Magnesium      2. Mixed hyperlipidemia  Lipid, Fasting      3. CAD S/P percutaneous coronary angioplasty             1.  CAD with NSTEMI s/p PCI:  Patient has no concerning symptoms. His compliant with his medications.     -Continue with aspirin 81 daily, Lipitor 40 daily, Brilinta, Lopressor 50 twice daily     2. Essential hypertension:  BP is well controlled with current medications.     -Increase losartan 50 daily, cw Lopressor 50 twice daily. - Check a BMP     3. Hyperlipidemia:  Discussed lipid profile from 2021.      -Agree with Lipitor 40 daily. Will check a FLP soon. 4.  Chronic HFpEF:  Patient appears euvolemic and hemo stable.      -I highly recommended low-salt diet (goal sodium less than 2000 mg/day)  -Daily weights and frequent BP checks  -Cw Lasix 20 mg daily           We will schedule a follow up visit in 6 months      I have spent 35 minutes of face to face time with the patient with more than 50% spent counseling and coordinating care. I have personally reviewed the reports and images of labs, radiological studies, cardiac studies including ECG's and telemetry, current and old medical records. The note was completed using EMR and Dragon dictation system. Every effort was made to ensure accuracy; however, inadvertent computerized transcription errors may be present. All questions and concerns were addressed to the patient/family. Alternatives to my treatment were discussed.      I would like to thank you for providing me the opportunity to participate in the care of your patient. If you have any questions, please do not hesitate to contact me.      Rupinder Reece MD, [facility] - [address] Office Phone: [phone]  Fax: [phone]

## 2022-11-01 RX ORDER — METOPROLOL TARTRATE 50 MG/1
TABLET, FILM COATED ORAL
Qty: 180 TABLET | Refills: 3 | Status: SHIPPED | OUTPATIENT
Start: 2022-11-01

## 2022-11-01 NOTE — TELEPHONE ENCOUNTER
Requested Prescriptions     Pending Prescriptions Disp Refills    metoprolol tartrate (LOPRESSOR) 50 MG tablet [Pharmacy Med Name: METOPROLOL TARTRATE 50MG TABLETS] 180 tablet 3     Sig: TAKE 1 TABLET BY MOUTH TWICE DAILY            Last Office Visit: 8/17/2021     Next Office Visit: [phone]

## 2022-11-07 NOTE — TELEPHONE ENCOUNTER
Medication:   Requested Prescriptions     Pending Prescriptions Disp Refills    metFORMIN (GLUCOPHAGE) 500 MG tablet [Pharmacy Med Name: METFORMIN 500MG TABLETS] 90 tablet 1     Sig: TAKE 1 TABLET BY MOUTH EVERY DAY WITH BREAKFAST        Last Filled:      Patient Phone Number: [phone] (home)     Last appt: 4/1/2022   Next appt: Visit date not found    Last OARRS:   RX Monitoring 5/3/2019   Attestation The Prescription Monitoring Report for this patient was reviewed today. Periodic Controlled Substance Monitoring Possible medication side effects, risk of tolerance/dependence & alternative treatments discussed. ;No signs of potential drug abuse or diversion identified: otherwise, see note documentation

## 2022-11-22 ENCOUNTER — TELEPHONE (OUTPATIENT)
Dept: CARDIOLOGY CLINIC | Age: 47
End: 2022-11-22

## 2022-11-28 ENCOUNTER — TELEPHONE (OUTPATIENT)
Dept: FAMILY MEDICINE CLINIC | Age: 47
End: 2022-11-28

## 2022-11-28 DIAGNOSIS — R06.09 DOE (DYSPNEA ON EXERTION): Primary | ICD-10-CM

## 2022-11-28 DIAGNOSIS — E66.01 MORBID OBESITY (HCC): ICD-10-CM

## 2022-11-28 NOTE — TELEPHONE ENCOUNTER
Pt called . [place] He has been working from home the last 2 yrs. His job called him to return to work \"in office\". He has to park 200 yards away from the office door. He doesn't think he can walk that far with his neuropathy. He would like a handicap placard. Also, since he is on a water pill, he needs a letter for his work statin that he will need to take frequent bathroom breaks.     Please advise    Last OV: 4-1-22    CB:  [phone]

## 2022-11-28 NOTE — LETTER
[facility]  [address], [address] [address]  Phone: [phone]  Fax: [phone]    Romelia Ortiz MD        November 28, 2022    Elena De Paz [facility]  [address]  [address]    Approval for [facility] [address] [facility] for the above referenced person with disabilities. Please contact my office if you have additional questions. Length of time: 5 years  Good through: November 28, 2027    If you have any questions or concerns, please don't hesitate to call.     Sincerely,        Romelia Ortiz MD

## 2022-11-30 DIAGNOSIS — M1A.9XX0 CHRONIC GOUT INVOLVING TOE WITHOUT TOPHUS, UNSPECIFIED CAUSE, UNSPECIFIED LATERALITY: ICD-10-CM

## 2022-12-01 RX ORDER — ALLOPURINOL 100 MG/1
100 TABLET ORAL DAILY
Qty: 90 TABLET | Refills: 1 | Status: SHIPPED | OUTPATIENT
Start: 2022-12-01

## 2022-12-01 NOTE — TELEPHONE ENCOUNTER
Medication:   Requested Prescriptions     Pending Prescriptions Disp Refills    allopurinol (ZYLOPRIM) 100 MG tablet [Pharmacy Med Name: ALLOPURINOL 100MG TABLETS] 90 tablet 1     Sig: TAKE 1 TABLET BY MOUTH DAILY        Last Filled:      Patient Phone Number: [phone] (home)     Last appt: 4/1/2022   Next appt: Visit date not found    Last OARRS:   RX Monitoring 5/3/2019   Attestation The Prescription Monitoring Report for this patient was reviewed today. Periodic Controlled Substance Monitoring Possible medication side effects, risk of tolerance/dependence & alternative treatments discussed. ;No signs of potential drug abuse or diversion identified: otherwise, see note documentation

## 2023-02-05 ENCOUNTER — HOSPITAL ENCOUNTER (EMERGENCY)
Age: 48
Discharge: HOME | End: 2023-02-05
Attending: EMERGENCY MEDICINE
Payer: COMMERCIAL

## 2023-02-05 ENCOUNTER — APPOINTMENT (OUTPATIENT)
Dept: GENERAL RADIOLOGY | Age: 48
End: 2023-02-05
Payer: COMMERCIAL

## 2023-02-05 VITALS
HEART RATE: 90 BPM | RESPIRATION RATE: 16 BRPM | WEIGHT: 315 LBS | DIASTOLIC BLOOD PRESSURE: 72 MMHG | HEIGHT: 67 IN | SYSTOLIC BLOOD PRESSURE: 142 MMHG | OXYGEN SATURATION: 95 % | BODY MASS INDEX: 49.44 KG/M2 | TEMPERATURE: 98.2 F

## 2023-02-05 DIAGNOSIS — R11.2 NAUSEA, VOMITING AND DIARRHEA: Primary | ICD-10-CM

## 2023-02-05 DIAGNOSIS — R19.7 NAUSEA, VOMITING AND DIARRHEA: Primary | ICD-10-CM

## 2023-02-05 LAB
A/G RATIO: 1.1 (ref 1.1–2.2)
ALBUMIN SERPL-MCNC: 3.7 G/DL (ref 3.4–5)
ALP BLD-CCNC: 129 U/L (ref 40–129)
ALT SERPL-CCNC: 37 U/L (ref 10–40)
AMORPHOUS: ABNORMAL /HPF
ANION GAP SERPL CALCULATED.3IONS-SCNC: 16 MMOL/L (ref 3–16)
AST SERPL-CCNC: 21 U/L (ref 15–37)
BACTERIA: ABNORMAL /HPF
BANDED NEUTROPHILS RELATIVE PERCENT: 12 % (ref 0–7)
BASOPHILS ABSOLUTE: 0 K/UL (ref 0–0.2)
BASOPHILS RELATIVE PERCENT: 0 %
BILIRUB SERPL-MCNC: 0.5 MG/DL (ref 0–1)
BILIRUBIN URINE: NEGATIVE
BLOOD, URINE: NEGATIVE
BUN BLDV-MCNC: 36 MG/DL (ref 7–20)
CALCIUM SERPL-MCNC: 9.5 MG/DL (ref 8.3–10.6)
CHLORIDE BLD-SCNC: 93 MMOL/L (ref 99–110)
CLARITY: CLEAR
CO2: 27 MMOL/L (ref 21–32)
COLOR: YELLOW
CREAT SERPL-MCNC: 1.5 MG/DL (ref 0.9–1.3)
EOSINOPHILS ABSOLUTE: 0.1 K/UL (ref 0–0.6)
EOSINOPHILS RELATIVE PERCENT: 1 %
EPITHELIAL CELLS, UA: ABNORMAL /HPF (ref 0–5)
FINE CASTS, UA: >40 /LPF (ref 0–2)
GFR SERPL CREATININE-BSD FRML MDRD: 58 ML/MIN/{1.73_M2}
GLUCOSE BLD-MCNC: 204 MG/DL (ref 70–99)
GLUCOSE URINE: NEGATIVE MG/DL
HCT VFR BLD CALC: 45.9 % (ref 40.5–52.5)
HEMOGLOBIN: 14.9 G/DL (ref 13.5–17.5)
HYALINE CASTS: ABNORMAL /LPF (ref 0–2)
KETONES, URINE: NEGATIVE MG/DL
LEUKOCYTE ESTERASE, URINE: NEGATIVE
LIPASE: 8 U/L (ref 13–60)
LYMPHOCYTES ABSOLUTE: 1.1 K/UL (ref 1–5.1)
LYMPHOCYTES RELATIVE PERCENT: 16 %
MAGNESIUM: 1.7 MG/DL (ref 1.8–2.4)
MCH RBC QN AUTO: 27 PG (ref 26–34)
MCHC RBC AUTO-ENTMCNC: 32.4 G/DL (ref 31–36)
MCV RBC AUTO: 83.4 FL (ref 80–100)
MICROSCOPIC EXAMINATION: YES
MONOCYTES ABSOLUTE: 1.1 K/UL (ref 0–1.3)
MONOCYTES RELATIVE PERCENT: 16 %
NEUTROPHILS ABSOLUTE: 4.4 K/UL (ref 1.7–7.7)
NEUTROPHILS RELATIVE PERCENT: 55 %
NITRITE, URINE: NEGATIVE
PDW BLD-RTO: 17.1 % (ref 12.4–15.4)
PH UA: 6 (ref 5–8)
PLATELET # BLD: 356 K/UL (ref 135–450)
PMV BLD AUTO: 7.9 FL (ref 5–10.5)
POTASSIUM REFLEX MAGNESIUM: 3.3 MMOL/L (ref 3.5–5.1)
PROTEIN UA: 100 MG/DL
RAPID INFLUENZA  B AGN: NEGATIVE
RAPID INFLUENZA A AGN: NEGATIVE
RBC # BLD: 5.5 M/UL (ref 4.2–5.9)
RBC UA: ABNORMAL /HPF (ref 0–4)
SARS-COV-2, NAAT: NOT DETECTED
SODIUM BLD-SCNC: 136 MMOL/L (ref 136–145)
SPECIFIC GRAVITY UA: >=1.03 (ref 1–1.03)
TOTAL PROTEIN: 7.2 G/DL (ref 6.4–8.2)
URINE REFLEX TO CULTURE: YES
URINE TYPE: ABNORMAL
UROBILINOGEN, URINE: 0.2 E.U./DL
WBC # BLD: 6.6 K/UL (ref 4–11)
WBC UA: ABNORMAL /HPF (ref 0–5)

## 2023-02-05 PROCEDURE — 80053 COMPREHEN METABOLIC PANEL: CPT

## 2023-02-05 PROCEDURE — 99285 EMERGENCY DEPT VISIT HI MDM: CPT

## 2023-02-05 PROCEDURE — 6360000002 HC RX W HCPCS: Performed by: EMERGENCY MEDICINE

## 2023-02-05 PROCEDURE — 6370000000 HC RX 637 (ALT 250 FOR IP): Performed by: EMERGENCY MEDICINE

## 2023-02-05 PROCEDURE — 87086 URINE CULTURE/COLONY COUNT: CPT

## 2023-02-05 PROCEDURE — 83690 ASSAY OF LIPASE: CPT

## 2023-02-05 PROCEDURE — 85025 COMPLETE CBC W/AUTO DIFF WBC: CPT

## 2023-02-05 PROCEDURE — 36415 COLL VENOUS BLD VENIPUNCTURE: CPT

## 2023-02-05 PROCEDURE — 96375 TX/PRO/DX INJ NEW DRUG ADDON: CPT

## 2023-02-05 PROCEDURE — 96361 HYDRATE IV INFUSION ADD-ON: CPT

## 2023-02-05 PROCEDURE — 87804 INFLUENZA ASSAY W/OPTIC: CPT

## 2023-02-05 PROCEDURE — 83735 ASSAY OF MAGNESIUM: CPT

## 2023-02-05 PROCEDURE — 96366 THER/PROPH/DIAG IV INF ADDON: CPT

## 2023-02-05 PROCEDURE — 81001 URINALYSIS AUTO W/SCOPE: CPT

## 2023-02-05 PROCEDURE — 93005 ELECTROCARDIOGRAM TRACING: CPT | Performed by: EMERGENCY MEDICINE

## 2023-02-05 PROCEDURE — 87635 SARS-COV-2 COVID-19 AMP PRB: CPT

## 2023-02-05 PROCEDURE — 2580000003 HC RX 258: Performed by: EMERGENCY MEDICINE

## 2023-02-05 PROCEDURE — 71046 X-RAY EXAM CHEST 2 VIEWS: CPT

## 2023-02-05 PROCEDURE — 96365 THER/PROPH/DIAG IV INF INIT: CPT

## 2023-02-05 RX ORDER — ONDANSETRON 4 MG/1
4 TABLET, ORALLY DISINTEGRATING ORAL EVERY 8 HOURS PRN
Qty: 24 TABLET | Refills: 0 | Status: SHIPPED | OUTPATIENT
Start: 2023-02-05 | End: 2023-02-12

## 2023-02-05 RX ORDER — POTASSIUM CHLORIDE 20 MEQ/1
40 TABLET, EXTENDED RELEASE ORAL ONCE
Status: COMPLETED | OUTPATIENT
Start: 2023-02-05 | End: 2023-02-05

## 2023-02-05 RX ORDER — SODIUM CHLORIDE, SODIUM LACTATE, POTASSIUM CHLORIDE, AND CALCIUM CHLORIDE .6; .31; .03; .02 G/100ML; G/100ML; G/100ML; G/100ML
1000 INJECTION, SOLUTION INTRAVENOUS ONCE
Status: COMPLETED | OUTPATIENT
Start: 2023-02-05 | End: 2023-02-05

## 2023-02-05 RX ORDER — ONDANSETRON 2 MG/ML
4 INJECTION INTRAMUSCULAR; INTRAVENOUS ONCE
Status: COMPLETED | OUTPATIENT
Start: 2023-02-05 | End: 2023-02-05

## 2023-02-05 RX ORDER — MAGNESIUM SULFATE IN WATER 40 MG/ML
2000 INJECTION, SOLUTION INTRAVENOUS ONCE
Status: COMPLETED | OUTPATIENT
Start: 2023-02-05 | End: 2023-02-05

## 2023-02-05 RX ADMIN — POTASSIUM CHLORIDE 40 MEQ: 1500 TABLET, EXTENDED RELEASE ORAL at 14:11

## 2023-02-05 RX ADMIN — MAGNESIUM SULFATE HEPTAHYDRATE 2000 MG: 40 INJECTION, SOLUTION INTRAVENOUS at 15:12

## 2023-02-05 RX ADMIN — SODIUM CHLORIDE, POTASSIUM CHLORIDE, SODIUM LACTATE AND CALCIUM CHLORIDE 1000 ML: 600; 310; 30; 20 INJECTION, SOLUTION INTRAVENOUS at 12:47

## 2023-02-05 RX ADMIN — SODIUM CHLORIDE, POTASSIUM CHLORIDE, SODIUM LACTATE AND CALCIUM CHLORIDE 1000 ML: 600; 310; 30; 20 INJECTION, SOLUTION INTRAVENOUS at 15:09

## 2023-02-05 RX ADMIN — ONDANSETRON 4 MG: 2 INJECTION INTRAMUSCULAR; INTRAVENOUS at 12:49

## 2023-02-05 ASSESSMENT — PAIN - FUNCTIONAL ASSESSMENT: PAIN_FUNCTIONAL_ASSESSMENT: NONE - DENIES PAIN

## 2023-02-05 NOTE — Clinical Note
Jordyn Gomez was seen and treated in our emergency department on 2/5/2023. He may return to work on 02/11/2023. Or sooner if symptoms improve     If you have any questions or concerns, please don't hesitate to call.       Monica Dewey MD

## 2023-02-05 NOTE — DISCHARGE INSTRUCTIONS
Today, you are seen for dehydration from nausea, vomiting, diarrhea. Your electrolytes were low, so we replaced them here. I will send you home with a prescription for nausea medication. Please follow-up with your primary care physician. You can always return to the emergency department immediately with new or worsening symptoms.

## 2023-02-05 NOTE — ED PROVIDER NOTES
[address] [facility]          ATTENDING PHYSICIAN NOTE       Date of evaluation: 2/5/2023    Chief Complaint     Abdominal Pain (+N/V/D x2 days, -fever)      History of Present Illness     Corrina Torrez is a 56 y.o. male with a past medical history of CAD status post 3 stents, DVT not on anticoagulation, hypertension, hyperlipidemia, CHARLIE, depression who presents to the emergency department today with nausea, vomiting, diarrhea, weakness, fatigue x2 days. No abdominal pain, although this is noted in the triage note. He does have a incisional hernia. No fevers or chills. No urinary symptoms. He is having approximately 3 episodes of diarrhea per day. No recent antibiotic use. No undercooked meats, unfiltered water, pot locks, travel, camping. No chest pain, shortness of breath, abdominal pain, fever, chills. Review of Systems     As documented in HPI, otherwise all other systems were reviewed and negative    Past Medical, Surgical, Family, and Social History     He has a past medical history of Abdominal hernia, Alcohol abuse, Anxiety, CAD S/P percutaneous coronary angioplasty, Clostridium difficile diarrhea, Clostridium difficile diarrhea, DVT (deep venous thrombosis) ([address] DVT of axillary vein, acute left ([address] Gout, H/O ulcer disease, Hernia, History of blood transfusion, Hyperlipemia, Hypertension, Hypertension, essential, Kidney congenitally absent, left, Kidney disease, Lightheaded, Obesity, Obstructive sleep apnea (adult) (pediatric), Renal agenesis, and Severe single current episode of major depressive disorder, without psychotic features ([address] He has a past surgical history that includes hernia repair; ventral hernia repair (3/23/15); other surgical history (4/20/2015); ventral hernia repair (6/2/16); and Tonsillectomy and adenoidectomy.   His family history includes Cancer in his father; Coronary Art Dis (age of onset: 36) in his mother; High Blood Pressure in his mother and sister; Hypertension in his mother and sister; Other in his mother and sister; Stroke (age of onset: 27) in his mother. He reports that he quit smoking about 5 years ago. His smoking use included cigarettes. He has a 2.00 pack-year smoking history. He has never used smokeless tobacco. He reports that he does not drink alcohol and does not use drugs. Medications     Previous Medications    ALLOPURINOL (ZYLOPRIM) 100 MG TABLET    TAKE 1 TABLET BY MOUTH DAILY    ASCORBIC ACID (VITAMIN C) 250 MG TABLET    Take 250 mg by mouth daily    ASPIRIN 81 MG EC TABLET    Take 1 tablet by mouth daily    ATORVASTATIN (LIPITOR) 40 MG TABLET    TAKE 1 TABLET BY MOUTH NIGHTLY    BUPROPION (ZYBAN) 150 MG EXTENDED RELEASE TABLET    TAKE 1 TABLET BY MOUTH TWICE DAILY    CHOLECALCIFEROL (VITAMIN D) 50 MCG (2000 UT) CAPS CAPSULE    One po qd    FUROSEMIDE (LASIX) 20 MG TABLET    Take 1 tablet by mouth daily    HANDICAP PLACARD MISC    by Does not apply route    LIRAGLUTIDE (VICTOZA) 18 MG/3ML SOPN SC INJECTION    Inject 0.6 mg into the skin daily    LOSARTAN (COZAAR) 50 MG TABLET    Take 1 tablet by mouth daily    METFORMIN (GLUCOPHAGE) 500 MG TABLET    TAKE 1 TABLET BY MOUTH EVERY DAY WITH BREAKFAST    METOPROLOL TARTRATE (LOPRESSOR) 50 MG TABLET    TAKE 1 TABLET BY MOUTH TWICE DAILY    NITROGLYCERIN (NITROSTAT) 0.4 MG SL TABLET    up to max of 3 total doses. If no relief after 1 dose, call 911. POTASSIUM GLUCONATE 595 (99 K) MG TABS    Take by mouth    SERTRALINE (ZOLOFT) 50 MG TABLET    TAKE 1 TABLET BY MOUTH DAILY    TICAGRELOR (BRILINTA) 90 MG TABS TABLET    Take 1 tablet by mouth 2 times daily       Allergies     He has No Known Allergies.     Physical Exam     INITIAL VITALS: BP: (!) 158/112, Temp: 98.2 °F (36.8 °C), Heart Rate: (!) 105, Resp: 24, SpO2: 98 %   General: Well-appearing, no acute distress  HEENT: head is atraumatic, pupils equal round and reactive to light, sclera are clear  Neck: Supple, no lymphadenopathy  Chest: Nonlabored respirations, equal chest rise bilaterally, no accessory muscle use  Cardiovascular: Tachycardic rate, regular rhythm, 2+ radial pulses bilaterally  Abdominal: Soft, nontender, nondistended, no rebound or guarding. Incisional hernia that is easily reducible. Back: No CVA tenderness bilaterally  Skin: Warm, dry, well-perfused, no rashes  Musculoskeletal: No obvious deformities, no tenderness to palpation diffusely  Neurologic: Alert and oriented x4, speech is clear and intact without dysarthria, moving all extremities normally  Psych: Appropriate mood and affect  Diagnostic Results     EKG   Twelve-lead EKG performed on 2/5/2023 at 1245 hrs. Sinus rhythm with incomplete right bundle branch block at a rate of 98. Normal axis. Good R wave progression. No significant Q waves noted. No ST segment changes noted. T wave flattening in the inferior leads. QTc prolonged at 538. QRS normal at 106. MS interval normal at 160. No STEMI. RADIOLOGY:  XR CHEST (2 VW)   Final Result      No acute process. Stable. LABS:   Results for orders placed or performed during the hospital encounter of 02/05/23   COVID-19, Rapid    Specimen: Nasopharyngeal Swab   Result Value Ref Range    SARS-CoV-2, NAAT Not Detected Not Detected   Rapid Flu Swab    Specimen: Nasopharyngeal   Result Value Ref Range    Rapid Influenza A Ag Negative Negative    Rapid Influenza B Ag Negative Negative   CBC with Auto Differential   Result Value Ref Range    WBC 6.6 4.0 - 11.0 K/uL    RBC 5.50 4. 20 - 5.90 M/uL    Hemoglobin 14.9 13.5 - 17.5 g/dL    Hematocrit 45.9 40.5 - 52.5 %    MCV 83.4 80.0 - 100.0 fL    MCH 27.0 26.0 - 34.0 pg    MCHC 32.4 31.0 - 36.0 g/dL    RDW 17.1 (H) 12.4 - 15.4 %    Platelets 536 954 - 442 K/uL    MPV 7.9 5.0 - 10.5 fL    Neutrophils % 55.0 %    Lymphocytes % 16.0 %    Monocytes % 16.0 %    Eosinophils % 1.0 %    Basophils % 0.0 %    Neutrophils Absolute 4.4 1.7 - 7.7 K/uL Lymphocytes Absolute 1.1 1.0 - 5.1 K/uL    Monocytes Absolute 1.1 0.0 - 1.3 K/uL    Eosinophils Absolute 0.1 0.0 - 0.6 K/uL    Basophils Absolute 0.0 0.0 - 0.2 K/uL    Bands Relative 12 (H) 0 - 7 %   CMP w/ Reflex to MG   Result Value Ref Range    Sodium 136 136 - 145 mmol/L    Potassium reflex Magnesium 3.3 (L) 3.5 - 5.1 mmol/L    Chloride 93 (L) 99 - 110 mmol/L    CO2 27 21 - 32 mmol/L    Anion Gap 16 3 - 16    Glucose 204 (H) 70 - 99 mg/dL    BUN 36 (H) 7 - 20 mg/dL    Creatinine 1.5 (H) 0.9 - 1.3 mg/dL    Est, Glom Filt Rate 58 (A) >60    Calcium 9.5 8.3 - 10.6 mg/dL    Total Protein 7.2 6.4 - 8.2 g/dL    Albumin 3.7 3.4 - 5.0 g/dL    Albumin/Globulin Ratio 1.1 1.1 - 2.2    Total Bilirubin 0.5 0.0 - 1.0 mg/dL    Alkaline Phosphatase 129 40 - 129 U/L    ALT 37 10 - 40 U/L    AST 21 15 - 37 U/L   Lipase   Result Value Ref Range    Lipase 8.0 (L) 13.0 - 60.0 U/L   Urinalysis with Reflex to Culture    Specimen: Urine   Result Value Ref Range    Color, UA Yellow Straw/Yellow    Clarity, UA Clear Clear    Glucose, Ur Negative Negative mg/dL    Bilirubin Urine Negative Negative    Ketones, Urine Negative Negative mg/dL    Specific Gravity, UA >=1.030 1.005 - 1.030    Blood, Urine Negative Negative    pH, UA 6.0 5.0 - 8.0    Protein,  (A) Negative mg/dL    Urobilinogen, Urine 0.2 <2.0 E.U./dL    Nitrite, Urine Negative Negative    Leukocyte Esterase, Urine Negative Negative    Microscopic Examination YES     Urine Type Voided    Magnesium   Result Value Ref Range    Magnesium 1.70 (L) 1.80 - 2.40 mg/dL       ED BEDSIDE ULTRASOUND:  No results found. RECENT VITALS:  BP: (!) 142/72,Temp: 98.2 °F (36.8 °C), Heart Rate: 90, Resp: 16, SpO2: 95 %     Procedures     Not applicable    ED Course     Nursing Notes, Past Medical Hx, Past Surgical Hx, Social Hx,Allergies, and Family Hx were reviewed.          patient was given the following medications:  Orders Placed This Encounter   Medications    lactated ringers bolus    ondansetron (ZOFRAN) injection 4 mg    potassium chloride (KLOR-CON M) extended release tablet 40 mEq    magnesium sulfate 2000 mg in 50 mL IVPB premix    lactated ringers bolus    ondansetron (ZOFRAN-ODT) 4 MG disintegrating tablet     Sig: Place 1 tablet under the tongue every 8 hours as needed for Nausea or Vomiting     Dispense:  24 tablet     Refill:  0     May Sub regular tablet (non-ODT) if insurance does not cover ODT       CONSULTS:  None    MEDICAL DECISIONMAKING / ASSESSMENT / Suzan John is a 56 y.o. male with a past medical history of CAD status post 3 stents, DVT not on anticoagulation, hypertension, hyperlipidemia, CHARLIE, depression who presents to the emergency department today with nausea, vomiting, diarrhea, weakness, fatigue x2 days. He arrives to the emergency department his ABCs intact. He is tachycardic and hypertensive, vitals otherwise within normal limits. His exam is reassuring. EKG negative for STEMI or arrhythmia. Chest x-ray shows no acute cardiopulmonary abnormalities. COVID/flu are negative. Labs are significant for a hypokalemia and hypomagnesemia. These were repleted here today. UA negative for UTI. He received 2 L of IV fluids and IV Zofran. On repeat evaluation, he felt significantly better. He had been able to p.o. challenge. Due to his work-up today, I think he can be safely discharged home. He should follow-up with his primary care physician. He can return to the emergency department immediately with new or worsening symptoms. I discussed this with the patient who verbalized understanding and agreement to the plan. He was discharged from the emergency department in stable condition. Amount and/or Complexity of Data Reviewed  External Data Reviewed: notes. Labs: ordered. Decision-making details documented in ED Course. Radiology: ordered and independent interpretation performed.  Decision-making details documented in ED Course. ECG/medicine tests: ordered. Decision-making details documented in ED Course. Risk  Prescription drug management. Decision regarding hospitalization. Clinical Impression     1.  Nausea, vomiting and diarrhea        Disposition     PATIENT REFERRED TO:  Osiel Alexander MD  [address] [address] [phone]    Schedule an appointment as soon as possible for a visit       DISCHARGE MEDICATIONS:  New Prescriptions    ONDANSETRON (ZOFRAN-ODT) 4 MG DISINTEGRATING TABLET    Place 1 tablet under the tongue every 8 hours as needed for Nausea or Vomiting       DISPOSITION Decision To Discharge 02/05/2023 05:34:13 PM         Demetra Miller MD  02/05/23 [id number]

## 2023-02-06 LAB
EKG ATRIAL RATE: 98 BPM
EKG DIAGNOSIS: NORMAL
EKG P AXIS: 50 DEGREES
EKG P-R INTERVAL: 160 MS
EKG Q-T INTERVAL: 422 MS
EKG QRS DURATION: 106 MS
EKG QTC CALCULATION (BAZETT): 538 MS
EKG R AXIS: 6 DEGREES
EKG T AXIS: 3 DEGREES
EKG VENTRICULAR RATE: 98 BPM
URINE CULTURE, ROUTINE: NORMAL

## 2023-02-08 ENCOUNTER — TELEMEDICINE (OUTPATIENT)
Dept: FAMILY MEDICINE CLINIC | Age: 48
End: 2023-02-08
Payer: COMMERCIAL

## 2023-02-08 DIAGNOSIS — D72.825 BANDEMIA: ICD-10-CM

## 2023-02-08 DIAGNOSIS — R19.7 DIARRHEA OF PRESUMED INFECTIOUS ORIGIN: Primary | ICD-10-CM

## 2023-02-08 DIAGNOSIS — R73.9 HYPERGLYCEMIA: ICD-10-CM

## 2023-02-08 PROCEDURE — 99214 OFFICE O/P EST MOD 30 MIN: CPT | Performed by: FAMILY MEDICINE

## 2023-02-08 ASSESSMENT — ENCOUNTER SYMPTOMS
ABDOMINAL DISTENTION: 0
VOMITING: 1
COLOR CHANGE: 0
BLOATING: 1
DIARRHEA: 1
SHORTNESS OF BREATH: 0
NAUSEA: 1
CHEST TIGHTNESS: 0
ABDOMINAL PAIN: 0

## 2023-02-08 NOTE — PROGRESS NOTES
Subjective:        Amrit Vick, was evaluated through a synchronous (real-time) audio-video encounter. The patient (or guardian if applicable) is aware that this is a billable service, which includes applicable co-pays. This Virtual Visit was conducted with patient's (and/or legal guardian's) consent. The visit was conducted pursuant to the emergency declaration under the [facility], [address] [place] [address] and the [employer] Act. Patient identification was verified, and a caregiver was present when appropriate. The patient was located at Home: [address] [place] [address]  Provider was located at [facility] ([place] [address]): [address], [address] [facility],  [facility]         Total time spent for this encounter: Not billed by time    --Avery Cardenas MD on 2/8/2023 at 12:34 PM    An electronic signature was used to authenticate this note. Patient ID: Amrit Vick is a 56 y.o. male. Here for follow up from ED visit  Reason for visit: N/ V diarrhea   Diagnosis given: same   Treatment: IVF,   Work up: bw,   Recommended follow up: 3 days   Now with following symptoms     Diarrhea   This is a new problem. The current episode started in the past 7 days. The problem occurs 2 to 4 times per day. The problem has been waxing and waning. The stool consistency is described as Watery. Associated symptoms include bloating and vomiting. Pertinent negatives include no abdominal pain, chills or fever. Associated symptoms comments: Nausea    . He has PMH significant  for c diff diarrhea and states \"it feels the same\"   No recent antibiotic tx     Review of Systems   Constitutional:  Positive for activity change, appetite change and fatigue. Negative for chills and fever. Respiratory:  Negative for chest tightness and shortness of breath. Cardiovascular:  Negative for chest pain and leg swelling. Gastrointestinal:  Positive for bloating, diarrhea, nausea and vomiting. Negative for abdominal distention and abdominal pain. Musculoskeletal:  Negative for gait problem. Skin:  Negative for color change and pallor. Objective:   Physical Exam  Vitals reviewed. Constitutional:       General: He is not in acute distress. Appearance: Normal appearance. He is obese. He is ill-appearing. He is not toxic-appearing or diaphoretic. HENT:      Head: Normocephalic and atraumatic. Pulmonary:      Effort: No respiratory distress. Musculoskeletal:      Cervical back: Normal range of motion. Skin:     Coloration: Skin is not pale. Neurological:      General: No focal deficit present. Mental Status: He is alert and oriented to person, place, and time. Mental status is at baseline. Psychiatric:         Mood and Affect: Mood normal.         Behavior: Behavior normal.       Assessment:       Diagnosis Orders   1. Diarrhea of presumed infectious origin  C DIFF TOXIN/ANTIGEN      2. Hyperglycemia  Basic Metabolic Panel    Hemoglobin A1C      3. Bandemia  CBC with Auto Differential              Plan:      Check c . Diff toxin in stool, treat with Dificid   On recent blood work his glucose was above 200's   Check repeat test with a1c   3.  NOS , repeat lab       Fu 1 week prdelmer Mace MD

## 2023-02-09 ENCOUNTER — TELEPHONE (OUTPATIENT)
Dept: FAMILY MEDICINE CLINIC | Age: 48
End: 2023-02-09

## 2023-02-09 RX ORDER — DICYCLOMINE HYDROCHLORIDE 10 MG/1
10 CAPSULE ORAL 4 TIMES DAILY
Qty: 120 CAPSULE | Refills: 0 | Status: SHIPPED | OUTPATIENT
Start: 2023-02-09

## 2023-02-09 NOTE — TELEPHONE ENCOUNTER
Pt brought his stool sample in today for C Diff. He is still feeling really bad. He has abdominal cramping/discomfort. Can he try an Rx for Dicyclomine for the abdominal discomfort?     Please advise    CB:  [phone]

## 2023-02-09 NOTE — TELEPHONE ENCOUNTER
rx transmitted electronically to the pharmacy via [employer]   Let patient know and verify pharmacy    If his pain is worse or unbearable needs to go back to ED asap

## 2023-02-10 LAB — C DIFF TOXIN/ANTIGEN: NORMAL

## 2023-02-23 ENCOUNTER — TELEPHONE (OUTPATIENT)
Dept: FAMILY MEDICINE CLINIC | Age: 48
End: 2023-02-23

## 2023-02-23 NOTE — TELEPHONE ENCOUNTER
----- Message from Yoni Aguilar sent at 2/23/2023  8:38 AM EST -----  Subject: Message to Provider    QUESTIONS  Information for Provider? Pt is calling in wanting to know if the office   has received his [place] paperwork. Pt would like a call back. Pt needs to   have this paperwork done before he can return to work.   ---------------------------------------------------------------------------  --------------  [address] [employer] [facility]  [phone]; OK to leave message on voicemail  ---------------------------------------------------------------------------  --------------  SCRIPT ANSWERS  Relationship to Patient?  Self

## 2023-02-24 NOTE — TELEPHONE ENCOUNTER
----- Message from Saima Alvarez sent at 2/24/2023  9:12 AM EST -----  Subject: Message to Provider    QUESTIONS  Information for Provider? Pt would like the [facility] paperwork dated from 2/5   to next Monday 2/27, when he plans to return. He requests it filled out   and faxed to [facility] today if possible so that he can return on Monday.  ---------------------------------------------------------------------------  --------------  [email or web address]  [phone]; OK to leave message on voicemail,OK to respond with electronic   message via [employer] portal (only for patients who have registered [employer]   account)  ---------------------------------------------------------------------------  --------------  SCRIPT ANSWERS  Relationship to Patient?  Self

## 2023-03-09 ENCOUNTER — TELEPHONE (OUTPATIENT)
Dept: FAMILY MEDICINE CLINIC | Age: 48
End: 2023-03-09

## 2023-03-10 ENCOUNTER — OFFICE VISIT (OUTPATIENT)
Dept: FAMILY MEDICINE CLINIC | Age: 48
End: 2023-03-10
Payer: COMMERCIAL

## 2023-03-10 VITALS
TEMPERATURE: 97.2 F | DIASTOLIC BLOOD PRESSURE: 98 MMHG | BODY MASS INDEX: 49.44 KG/M2 | HEART RATE: 95 BPM | SYSTOLIC BLOOD PRESSURE: 132 MMHG | HEIGHT: 67 IN | OXYGEN SATURATION: 96 % | RESPIRATION RATE: 20 BRPM | WEIGHT: 315 LBS

## 2023-03-10 DIAGNOSIS — L97.929 VENOUS ULCER OF LEFT LOWER EXTREMITY WITHOUT VARICOSE VEINS (HCC): Primary | ICD-10-CM

## 2023-03-10 DIAGNOSIS — E78.5 HYPERLIPIDEMIA, UNSPECIFIED HYPERLIPIDEMIA TYPE: ICD-10-CM

## 2023-03-10 DIAGNOSIS — G62.9 NEUROPATHY: ICD-10-CM

## 2023-03-10 DIAGNOSIS — I87.2 VENOUS ULCER OF LEFT LOWER EXTREMITY WITHOUT VARICOSE VEINS (HCC): Primary | ICD-10-CM

## 2023-03-10 PROCEDURE — 3075F SYST BP GE 130 - 139MM HG: CPT | Performed by: NURSE PRACTITIONER

## 2023-03-10 PROCEDURE — 36415 COLL VENOUS BLD VENIPUNCTURE: CPT | Performed by: NURSE PRACTITIONER

## 2023-03-10 PROCEDURE — 3080F DIAST BP >= 90 MM HG: CPT | Performed by: NURSE PRACTITIONER

## 2023-03-10 PROCEDURE — 99214 OFFICE O/P EST MOD 30 MIN: CPT | Performed by: NURSE PRACTITIONER

## 2023-03-10 RX ORDER — ACETAMINOPHEN 650 MG
TABLET, EXTENDED RELEASE ORAL
Qty: 30 ML | Refills: 1 | Status: SHIPPED | OUTPATIENT
Start: 2023-03-10 | End: 2023-03-17

## 2023-03-10 RX ORDER — PETROLATUM 42 G/100G
OINTMENT TOPICAL
Qty: 228 G | Refills: 2 | Status: SHIPPED | OUTPATIENT
Start: 2023-03-10

## 2023-03-10 RX ORDER — GABAPENTIN 300 MG/1
300 CAPSULE ORAL 2 TIMES DAILY
Qty: 60 CAPSULE | Refills: 0 | Status: SHIPPED | OUTPATIENT
Start: 2023-03-10 | End: 2023-04-09

## 2023-03-10 SDOH — ECONOMIC STABILITY: FOOD INSECURITY: WITHIN THE PAST 12 MONTHS, YOU WORRIED THAT YOUR FOOD WOULD RUN OUT BEFORE YOU GOT MONEY TO BUY MORE.: NEVER TRUE

## 2023-03-10 SDOH — ECONOMIC STABILITY: HOUSING INSECURITY
IN THE LAST 12 MONTHS, WAS THERE A TIME WHEN YOU DID NOT HAVE A STEADY PLACE TO SLEEP OR SLEPT IN A SHELTER (INCLUDING NOW)?: NO

## 2023-03-10 SDOH — ECONOMIC STABILITY: FOOD INSECURITY: WITHIN THE PAST 12 MONTHS, THE FOOD YOU BOUGHT JUST DIDN'T LAST AND YOU DIDN'T HAVE MONEY TO GET MORE.: NEVER TRUE

## 2023-03-10 SDOH — ECONOMIC STABILITY: INCOME INSECURITY: HOW HARD IS IT FOR YOU TO PAY FOR THE VERY BASICS LIKE FOOD, HOUSING, MEDICAL CARE, AND HEATING?: NOT HARD AT ALL

## 2023-03-10 ASSESSMENT — PATIENT HEALTH QUESTIONNAIRE - PHQ9
1. LITTLE INTEREST OR PLEASURE IN DOING THINGS: 0
SUM OF ALL RESPONSES TO PHQ QUESTIONS 1-9: 0
SUM OF ALL RESPONSES TO PHQ QUESTIONS 1-9: 0
SUM OF ALL RESPONSES TO PHQ9 QUESTIONS 1 & 2: 0
9. THOUGHTS THAT YOU WOULD BE BETTER OFF DEAD, OR OF HURTING YOURSELF: 0
2. FEELING DOWN, DEPRESSED OR HOPELESS: 0
6. FEELING BAD ABOUT YOURSELF - OR THAT YOU ARE A FAILURE OR HAVE LET YOURSELF OR YOUR FAMILY DOWN: 0
8. MOVING OR SPEAKING SO SLOWLY THAT OTHER PEOPLE COULD HAVE NOTICED. OR THE OPPOSITE, BEING SO FIGETY OR RESTLESS THAT YOU HAVE BEEN MOVING AROUND A LOT MORE THAN USUAL: 0
3. TROUBLE FALLING OR STAYING ASLEEP: 0
10. IF YOU CHECKED OFF ANY PROBLEMS, HOW DIFFICULT HAVE THESE PROBLEMS MADE IT FOR YOU TO DO YOUR WORK, TAKE CARE OF THINGS AT HOME, OR GET ALONG WITH OTHER PEOPLE: 0
5. POOR APPETITE OR OVEREATING: 0
4. FEELING TIRED OR HAVING LITTLE ENERGY: 0
SUM OF ALL RESPONSES TO PHQ QUESTIONS 1-9: 0
SUM OF ALL RESPONSES TO PHQ QUESTIONS 1-9: 0
7. TROUBLE CONCENTRATING ON THINGS, SUCH AS READING THE NEWSPAPER OR WATCHING TELEVISION: 0

## 2023-03-10 ASSESSMENT — ENCOUNTER SYMPTOMS
WHEEZING: 0
SHORTNESS OF BREATH: 0
COLOR CHANGE: 1
BACK PAIN: 0

## 2023-03-10 NOTE — PROGRESS NOTES
Steve Caldwell (:  1975) is a 56 y.o. male,Established patient, here for evaluation of the following chief complaint(s): Foot Swelling (Has been going on for awhile: starting to itch B/L feet )      ASSESSMENT/PLAN:  1. Venous ulcer of left lower extremity without varicose veins (HCC)  Assessment & Plan:   Schedule with vascular specialist, apply betadine to open area, get compression stockings, steroid cream and emoliient to bilateral lower legs, check A1c, BMP, CBC LIPIDS today  Orders:  -     triamcinolone (KENALOG) 0.1 % ointment; Apply topically 2 times daily, Topical, 2 TIMES DAILY Starting Fri 3/10/2023, Until Sun 2023, For 30 days, Disp-80 g, R-2, Normal  -     mineral oil-hydrophilic petrolatum (HYDROPHOR) ointment; Apply topically as needed. , Disp-228 g, R-2, Normal  -     povidone-iodine (BETADINE) 10 % external solution; Apply topically as needed. , Disp-30 mL, R-1, Normal  -     Yesenia Serna MD, Vascular Surgery, [facility]  2. Body mass index (BMI) 60.0-69.9, adult ([facility] [address]  3. Neuropathy  Assessment & Plan:   Start gabapentin, as needed, may cause drowsiness, check A1c today  Orders:  -     gabapentin (NEURONTIN) 300 MG capsule; Take 1 capsule by mouth 2 times daily for 30 days. Intended supply: 30 days, Disp-60 capsule, R-0Normal  4. Hyperlipidemia, unspecified hyperlipidemia type  Assessment & Plan:   Well-controlled, continue current medications and check lipid panel today  Orders:  -     Lipid Panel; Future    Patient Instructions   Start emollient cream to lower legs bilaterally  Betadine to open area  Steroid cream to dry itchy areas  Gabapentin for pain  Schedule with vascular specialist     No follow-ups on file. SUBJECTIVE/OBJECTIVE:  Pt here with concerns of bilateral leg swelling.  Did injure left toe/foot a few weeks ago after tripping over cat and hitting foot on couch, Sits at desk for work   C/O itching to bilateral lower legs, burning pain worse at night when lying down, denies calf pain, shortness of breath  Hx of chf, managed by cardiology, weight down 10 lbs from last visit, no recent lasix dose changes        Current Outpatient Medications   Medication Sig Dispense Refill    triamcinolone (KENALOG) 0.1 % ointment Apply topically 2 times daily 80 g 2    mineral oil-hydrophilic petrolatum (HYDROPHOR) ointment Apply topically as needed. 228 g 2    povidone-iodine (BETADINE) 10 % external solution Apply topically as needed. 30 mL 1    gabapentin (NEURONTIN) 300 MG capsule Take 1 capsule by mouth 2 times daily for 30 days. Intended supply: 30 days 60 capsule 0    allopurinol (ZYLOPRIM) 100 MG tablet TAKE 1 TABLET BY MOUTH DAILY 90 tablet 1    Handicap Placard MISC by Does not apply route 1 each 0    metFORMIN (GLUCOPHAGE) 500 MG tablet TAKE 1 TABLET BY MOUTH EVERY DAY WITH BREAKFAST 90 tablet 1    metoprolol tartrate (LOPRESSOR) 50 MG tablet TAKE 1 TABLET BY MOUTH TWICE DAILY 180 tablet 3    losartan (COZAAR) 50 MG tablet Take 1 tablet by mouth daily 90 tablet 3    atorvastatin (LIPITOR) 40 MG tablet TAKE 1 TABLET BY MOUTH NIGHTLY 90 tablet 3    sertraline (ZOLOFT) 50 MG tablet TAKE 1 TABLET BY MOUTH DAILY 90 tablet 5    buPROPion (ZYBAN) 150 MG extended release tablet TAKE 1 TABLET BY MOUTH TWICE DAILY 180 tablet 3    ticagrelor (BRILINTA) 90 MG TABS tablet Take 1 tablet by mouth 2 times daily 180 tablet 3    furosemide (LASIX) 20 MG tablet Take 1 tablet by mouth daily 90 tablet 3    aspirin 81 MG EC tablet Take 1 tablet by mouth daily 30 tablet 5    Potassium Gluconate 595 (99 K) MG TABS Take by mouth      Cholecalciferol (VITAMIN D) 50 MCG (2000 UT) CAPS capsule One po qd 90 capsule 1    Ascorbic Acid (VITAMIN C) 250 MG tablet Take 250 mg by mouth daily      nitroGLYCERIN (NITROSTAT) 0.4 MG SL tablet up to max of 3 total doses. If no relief after 1 dose, call 911.  (Patient not taking: Reported on 3/10/2023) 25 tablet 3     No current facility-administered medications for this visit. 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, left  No date: Kidney disease      Comment:  BORN WITHOUT LEFT KIDNEY NO PROBLEMS  No date: Lightheaded  3/10/2023: Neuropathy  2013: Obesity  No date: Obstructive sleep apnea (adult) (pediatric)  No date: Renal agenesis  2016: Severe single current episode of major depressive   disorder, without psychotic features [facility])  Past Surgical History:   Procedure Laterality Date    HERNIA REPAIR          OTHER SURGICAL HISTORY  2015    EXCISIONAL DEBRIDEMENT AND IRRIGATION OF ABDOMINAL WOUND    TONSILLECTOMY AND ADENOIDECTOMY      VENTRAL HERNIA REPAIR  3/23/15    with mesh    VENTRAL HERNIA REPAIR  16     Social History     Socioeconomic History    Marital status:      Spouse name: Not on file    Number of children: Not on file    Years of education: Not on file    Highest education level: Not on file   Occupational History    Not on file   Tobacco Use    Smoking status: Former     Packs/day: 4.00     Years: 0.50     Pack years: 2.00     Types: Cigarettes     Quit date: 2017     Years since quittin.6    Smokeless tobacco: Never   Substance and Sexual Activity    Alcohol use: No     Alcohol/week: 0.0 standard drinks    Drug use: No    Sexual activity: Never     Partners: Female   Other Topics Concern    Not on file   Social History Narrative    Not on file     Social Determinants of Health     Financial Resource Strain: Low Risk     Difficulty of Paying Living Expenses: Not hard at all   Food Insecurity: No Food Insecurity    Worried About Running Out of Food in the Last Year: Never true    Ran Out of Food in the Last Year: Never true   Transportation Needs: Unknown    Lack of Transportation (Medical): Not on file    Lack of Transportation (Non-Medical): No   Physical Activity: Not on file   Stress: Not on file   Social Connections: Not on file   Intimate Partner Violence: Not on file   Housing Stability: Unknown    Unable to Pay for Housing in the Last Year: Not on file    Number of Places Lived in the Last Year: Not on file    Unstable Housing in the Last Year: No         Review of Systems   Constitutional:  Negative for activity change, appetite change, chills, diaphoresis, fatigue, fever and unexpected weight change. Respiratory:  Negative for shortness of breath and wheezing. Cardiovascular:  Positive for leg swelling. Negative for chest pain and palpitations. Musculoskeletal:  Negative for arthralgias, back pain, gait problem, joint swelling and myalgias. Skin:  Positive for color change, rash and wound. Negative for pallor. Neurological:  Negative for dizziness and headaches. Psychiatric/Behavioral:  Positive for sleep disturbance.     Vitals:    03/10/23 1057 03/10/23 1106   BP: (!) 148/104 (!) 132/98   Pulse: 95    Resp: 20    Temp: 97.2 °F (36.2 °C)    TempSrc: Temporal    SpO2: 96%    Weight: (!) 402 lb 3.2 oz (182.4 kg)    Height: 5' 7\" (1.702 m)       Recent Results (from the past 2016 hour(s))   COVID-19, Rapid    Collection Time: 02/05/23 12:43 PM    Specimen: Nasopharyngeal Swab   Result Value Ref Range    SARS-CoV-2, NAAT Not Detected Not Detected   EKG 12 Lead    Collection Time: 02/05/23 12:45 PM   Result Value Ref Range    Ventricular Rate 98 BPM    Atrial Rate 98 BPM    P-R Interval 160 ms    QRS Duration 106 ms    Q-T Interval 422 ms    QTc Calculation (Bazett) 538 ms    P Axis 50 degrees    R Axis 6 degrees    T Axis 3 degrees    Diagnosis       EKG performed in ER and to be interpreted by ER physician. Confirmed by MD, ER ([id number]),  Henry Kaba ([id number]) on 2/6/2023 6:01:58 AM   CBC with Auto Differential    Collection Time: 02/05/23 12:59 PM   Result Value Ref Range    WBC 6.6 4.0 - 11.0 K/uL    RBC 5.50 4. 20 - 5.90 M/uL    Hemoglobin 14.9 13.5 - 17.5 g/dL    Hematocrit 45.9 40.5 - 52.5 %    MCV 83.4 80.0 - 100.0 fL    MCH 27.0 26.0 - 34.0 pg    MCHC 32.4 31.0 - 36.0 g/dL    RDW 17.1 (H) 12.4 - 15.4 %    Platelets 357 553 - 298 K/uL    MPV 7.9 5.0 - 10.5 fL    Neutrophils % 55.0 %    Lymphocytes % 16.0 %    Monocytes % 16.0 %    Eosinophils % 1.0 %    Basophils % 0.0 %    Neutrophils Absolute 4.4 1.7 - 7.7 K/uL    Lymphocytes Absolute 1.1 1.0 - 5.1 K/uL    Monocytes Absolute 1.1 0.0 - 1.3 K/uL    Eosinophils Absolute 0.1 0.0 - 0.6 K/uL    Basophils Absolute 0.0 0.0 - 0.2 K/uL    Bands Relative 12 (H) 0 - 7 %   CMP w/ Reflex to MG    Collection Time: 02/05/23 12:59 PM   Result Value Ref Range    Sodium 136 136 - 145 mmol/L    Potassium reflex Magnesium 3.3 (L) 3.5 - 5.1 mmol/L    Chloride 93 (L) 99 - 110 mmol/L    CO2 27 21 - 32 mmol/L    Anion Gap 16 3 - 16    Glucose 204 (H) 70 - 99 mg/dL    BUN 36 (H) 7 - 20 mg/dL    Creatinine 1.5 (H) 0.9 - 1.3 mg/dL    Est, Glom Filt Rate 58 (A) >60    Calcium 9.5 8.3 - 10.6 mg/dL    Total Protein 7.2 6.4 - 8.2 g/dL    Albumin 3.7 3.4 - 5.0 g/dL    Albumin/Globulin Ratio 1.1 1.1 - 2.2    Total Bilirubin 0.5 0.0 - 1.0 mg/dL    Alkaline Phosphatase 129 40 - 129 U/L    ALT 37 10 - 40 U/L    AST 21 15 - 37 U/L   Lipase    Collection Time: 02/05/23 12:59 PM   Result Value Ref Range    Lipase 8.0 (L) 13.0 - 60.0 U/L   Magnesium    Collection Time: 02/05/23 12:59 PM   Result Value Ref Range    Magnesium 1.70 (L) 1.80 - 2.40 mg/dL   Rapid Flu Swab    Collection Time: 02/05/23  1:08 PM    Specimen: Nasopharyngeal   Result Value Ref Range    Rapid Influenza A Ag Negative Negative    Rapid Influenza B Ag Negative Negative   Urinalysis with Reflex to Culture    Collection Time: 02/05/23  4:45 PM    Specimen: Urine   Result Value Ref Range    Color, UA Yellow Straw/Yellow    Clarity, UA Clear Clear    Glucose, Ur Negative Negative mg/dL    Bilirubin Urine Negative Negative    Ketones, Urine Negative Negative mg/dL    Specific Gravity, UA >=1.030 1.005 - 1.030    Blood, Urine Negative Negative    pH, UA 6.0 5.0 - 8.0    Protein,  (A) Negative mg/dL    Urobilinogen, Urine 0.2 <2.0 E.U./dL    Nitrite, Urine Negative Negative    Leukocyte Esterase, Urine Negative Negative    Microscopic Examination YES     Urine Type Voided     Urine Reflex to Culture Yes    Microscopic Urinalysis    Collection Time: 02/05/23  4:45 PM   Result Value Ref Range    Hyaline Casts, UA 11-20 (A) 0 - 2 /LPF    Fine Casts, UA >40 (A) 0 - 2 /LPF    WBC, UA 10-20 (A) 0 - 5 /HPF    RBC, UA 0-2 0 - 4 /HPF    Epithelial Cells, UA 2-5 0 - 5 /HPF    Bacteria, UA 2+ (A) None Seen /HPF    Amorphous, UA Rare /HPF   Culture, Urine    Collection Time: 02/05/23  9:52 PM    Specimen: Urine, clean catch   Result Value Ref Range    Urine Culture, Routine No growth at 18 to 36 hours    C DIFF TOXIN/ANTIGEN    Collection Time: 02/09/23  1:00 PM    Specimen: Stool   Result Value Ref Range    C.diff Toxin/Antigen       Negative for Clostridium difficile antigen and toxin  Normal Range: Negative          Wt Readings from Last 3 Encounters:   03/10/23 (!) 402 lb 3.2 oz (182.4 kg)   02/05/23 (!) 412 lb 12.8 oz (187.2 kg)   10/14/22 (!) 418 lb 12.8 oz (190 kg)        Physical Exam  Vitals and nursing note reviewed. Constitutional:       General: He is not in acute distress. Appearance: He is obese. He is not toxic-appearing. HENT:      Head: Normocephalic and atraumatic. Cardiovascular:      Rate and Rhythm: Normal rate and regular rhythm.       Pulses:           Dorsalis pedis pulses are 1+ on the right side and 1+ on the left side. Heart sounds: No murmur heard. Pulmonary:      Effort: Pulmonary effort is normal. No respiratory distress. Breath sounds: Normal breath sounds. No stridor. No wheezing or rhonchi. Musculoskeletal:         General: Swelling present. No tenderness. Cervical back: Normal range of motion. Right lower leg: Edema present. Left lower leg: Edema present. Right foot: Normal range of motion. No deformity. Left foot: Normal range of motion. No deformity. Feet:    Feet:      Right foot:      Skin integrity: Callus and dry skin present. No erythema. Left foot:      Skin integrity: Blister, callus, dry skin and fissure present. No erythema. Comments: Bilateral feet dusky in color, left greater than right, cap refill wnl <3 sec, open area to left dorsal foot with blistering. Chapped, fissured skin to bilateral lower legs  Neurological:      General: No focal deficit present. Mental Status: He is alert and oriented to person, place, and time. Psychiatric:         Mood and Affect: Mood normal.                 An electronic signature was used to authenticate this note.     --SELMA Mcdowell - CNP

## 2023-03-10 NOTE — PATIENT INSTRUCTIONS
Start emollient cream to lower legs bilaterally  Betadine to open area  Steroid cream to dry itchy areas  Gabapentin for pain  Schedule with vascular specialist

## 2023-03-10 NOTE — ASSESSMENT & PLAN NOTE
Schedule with vascular specialist, apply betadine to open area, get compression stockings, steroid cream and emoliient to bilateral lower legs, check A1c, BMP, CBC LIPIDS today

## 2023-03-11 LAB
CHOLESTEROL, TOTAL: 116 MG/DL (ref 0–199)
HDLC SERPL-MCNC: 39 MG/DL (ref 40–60)
LDL CHOLESTEROL CALCULATED: 53 MG/DL
TRIGL SERPL-MCNC: 119 MG/DL (ref 0–150)
VLDLC SERPL CALC-MCNC: 24 MG/DL

## 2023-03-29 LAB
AVERAGE GLUCOSE: NORMAL
HBA1C MFR BLD: 7.2 %

## 2023-04-03 ENCOUNTER — TELEPHONE (OUTPATIENT)
Dept: FAMILY MEDICINE CLINIC | Age: 48
End: 2023-04-03

## 2023-04-03 NOTE — TELEPHONE ENCOUNTER
Patient called stating that he was in the hospital for a few days for a bowel obstruction. Patient called needing a note for clearance to go back to work.      Last Office Visit: 03/10/2023

## 2023-04-05 ENCOUNTER — OFFICE VISIT (OUTPATIENT)
Dept: FAMILY MEDICINE CLINIC | Age: 48
End: 2023-04-05

## 2023-04-05 VITALS
WEIGHT: 315 LBS | BODY MASS INDEX: 49.44 KG/M2 | DIASTOLIC BLOOD PRESSURE: 88 MMHG | HEIGHT: 67 IN | RESPIRATION RATE: 16 BRPM | OXYGEN SATURATION: 97 % | HEART RATE: 94 BPM | TEMPERATURE: 97.2 F | SYSTOLIC BLOOD PRESSURE: 136 MMHG

## 2023-04-05 DIAGNOSIS — M1A.9XX0 CHRONIC GOUT INVOLVING TOE WITHOUT TOPHUS, UNSPECIFIED CAUSE, UNSPECIFIED LATERALITY: ICD-10-CM

## 2023-04-05 DIAGNOSIS — E11.9 TYPE 2 DIABETES MELLITUS WITHOUT COMPLICATION, WITHOUT LONG-TERM CURRENT USE OF INSULIN (HCC): Primary | ICD-10-CM

## 2023-04-05 DIAGNOSIS — I25.118 CORONARY ARTERY DISEASE OF NATIVE HEART WITH STABLE ANGINA PECTORIS, UNSPECIFIED VESSEL OR LESION TYPE (HCC): ICD-10-CM

## 2023-04-05 DIAGNOSIS — I50.32 CHRONIC HEART FAILURE WITH PRESERVED EJECTION FRACTION (HCC): ICD-10-CM

## 2023-04-05 DIAGNOSIS — E11.40 TYPE 2 DIABETES MELLITUS WITH DIABETIC NEUROPATHY, WITHOUT LONG-TERM CURRENT USE OF INSULIN (HCC): ICD-10-CM

## 2023-04-05 DIAGNOSIS — G62.9 NEUROPATHY: ICD-10-CM

## 2023-04-05 DIAGNOSIS — Z09 HOSPITAL DISCHARGE FOLLOW-UP: ICD-10-CM

## 2023-04-05 DIAGNOSIS — I10 PRIMARY HYPERTENSION: Chronic | ICD-10-CM

## 2023-04-05 DIAGNOSIS — F32.2 SEVERE SINGLE CURRENT EPISODE OF MAJOR DEPRESSIVE DISORDER, WITHOUT PSYCHOTIC FEATURES (HCC): ICD-10-CM

## 2023-04-05 DIAGNOSIS — F41.8 DEPRESSION WITH ANXIETY: ICD-10-CM

## 2023-04-05 RX ORDER — GABAPENTIN 300 MG/1
600 CAPSULE ORAL 2 TIMES DAILY
Qty: 120 CAPSULE | Refills: 0 | Status: SHIPPED | OUTPATIENT
Start: 2023-04-05 | End: 2023-05-05

## 2023-04-05 RX ORDER — ALLOPURINOL 100 MG/1
100 TABLET ORAL DAILY
Qty: 90 TABLET | Refills: 1 | Status: SHIPPED | OUTPATIENT
Start: 2023-04-05

## 2023-04-05 RX ORDER — BUPROPION HYDROCHLORIDE 150 MG/1
TABLET, EXTENDED RELEASE ORAL
Qty: 180 TABLET | Refills: 3 | Status: SHIPPED | OUTPATIENT
Start: 2023-04-05

## 2023-04-05 RX ORDER — ATORVASTATIN CALCIUM 40 MG/1
40 TABLET, FILM COATED ORAL NIGHTLY
Qty: 90 TABLET | Refills: 3 | Status: SHIPPED | OUTPATIENT
Start: 2023-04-05

## 2023-04-05 RX ORDER — METOPROLOL TARTRATE 50 MG/1
50 TABLET, FILM COATED ORAL 2 TIMES DAILY
Qty: 180 TABLET | Refills: 3 | Status: SHIPPED | OUTPATIENT
Start: 2023-04-05

## 2023-04-05 RX ORDER — LOSARTAN POTASSIUM 50 MG/1
50 TABLET ORAL DAILY
Qty: 90 TABLET | Refills: 3 | Status: SHIPPED | OUTPATIENT
Start: 2023-04-05

## 2023-04-05 RX ORDER — FUROSEMIDE 20 MG/1
20 TABLET ORAL DAILY
Qty: 90 TABLET | Refills: 3 | Status: SHIPPED | OUTPATIENT
Start: 2023-04-05

## 2023-04-05 RX ORDER — GABAPENTIN 300 MG/1
300 CAPSULE ORAL 2 TIMES DAILY
Qty: 60 CAPSULE | Refills: 0 | Status: CANCELLED | OUTPATIENT
Start: 2023-04-05 | End: 2023-05-05

## 2023-04-05 NOTE — PROGRESS NOTES
needed. 228 g 2    Handicap Placard MISC by Does not apply route 1 each 0    aspirin 81 MG EC tablet Take 1 tablet by mouth daily 30 tablet 5    nitroGLYCERIN (NITROSTAT) 0.4 MG SL tablet up to max of 3 total doses. If no relief after 1 dose, call 911. 25 tablet 3    Potassium Gluconate 595 (99 K) MG TABS Take by mouth      Cholecalciferol (VITAMIN D) 50 MCG (2000 UT) CAPS capsule One po qd 90 capsule 1    Ascorbic Acid (VITAMIN C) 250 MG tablet Take 1 tablet by mouth daily          Medications patient taking as of now reconciled against medications ordered at time of hospital discharge: Yes    A comprehensive review of systems was negative except for what was noted in the HPI. Objective:    /88   Pulse 94   Temp 97.2 °F (36.2 °C) (Tympanic)   Resp 16   Ht 5' 7\" (1.702 m)   Wt (!) 400 lb (181.4 kg)   SpO2 97%   BMI 62.65 kg/m²   Physical Exam  Vitals and nursing note reviewed. Constitutional:       General: He is not in acute distress. Appearance: Normal appearance. He is not ill-appearing. HENT:      Head: Normocephalic and atraumatic. Nose: Nose normal.   Eyes:      Extraocular Movements: Extraocular movements intact. Conjunctiva/sclera: Conjunctivae normal.      Pupils: Pupils are equal, round, and reactive to light. Neck:      Vascular: No carotid bruit. Cardiovascular:      Rate and Rhythm: Normal rate and regular rhythm. Pulses: Normal pulses. Heart sounds: Normal heart sounds. Pulmonary:      Effort: Pulmonary effort is normal.      Breath sounds: Normal breath sounds. Abdominal:      General: Abdomen is protuberant. Bowel sounds are normal. There is no distension. Palpations: Abdomen is soft. Tenderness: There is no abdominal tenderness. There is no guarding or rebound. Hernia: A hernia is present. Hernia is present in the ventral area. Musculoskeletal:         General: No swelling. Normal range of motion.       Cervical back: Normal range

## 2023-04-05 NOTE — LETTER
April 5, 2023       Nathan Zavaleta YOB: 1975   [address] Date of Visit:  4/5/2023       To Whom It May Concern: It is my medical opinion that Clive Evans was ill starting 3/27/23 and may return to work on 4/9/23. If you have any questions or concerns, please don't hesitate to call.     Sincerely,        Олег Green, SELMA - CNP

## 2023-05-06 DIAGNOSIS — G62.9 NEUROPATHY: ICD-10-CM

## 2023-05-08 RX ORDER — GABAPENTIN 300 MG/1
CAPSULE ORAL
Qty: 120 CAPSULE | Refills: 2 | Status: SHIPPED | OUTPATIENT
Start: 2023-05-08 | End: 2023-08-06

## 2023-05-08 NOTE — TELEPHONE ENCOUNTER
Medication:   Requested Prescriptions     Pending Prescriptions Disp Refills    gabapentin (NEURONTIN) 300 MG capsule [Pharmacy Med Name: GABAPENTIN 300MG CAPSULES] 120 capsule 0     Sig: TAKE 2 CAPSULES BY MOUTH TWICE DAILY        Last Filled:      Patient Phone Number: [phone] (home)     Last appt: 4/5/2023   Next appt: Visit date not found    Last OARRS:   RX Monitoring 5/3/2019   Attestation The Prescription Monitoring Report for this patient was reviewed today. Periodic Controlled Substance Monitoring Possible medication side effects, risk of tolerance/dependence & alternative treatments discussed. ;No signs of potential drug abuse or diversion identified: otherwise, see note documentation

## 2023-08-06 DIAGNOSIS — G62.9 NEUROPATHY: ICD-10-CM

## 2023-08-07 ENCOUNTER — TELEPHONE (OUTPATIENT)
Dept: CARDIOLOGY CLINIC | Age: 48
End: 2023-08-07

## 2023-08-07 RX ORDER — GABAPENTIN 300 MG/1
CAPSULE ORAL
Qty: 120 CAPSULE | Refills: 2 | Status: SHIPPED | OUTPATIENT
Start: 2023-08-07 | End: 2023-11-05

## 2023-08-07 NOTE — TELEPHONE ENCOUNTER
Spoke to pt and provided patient assistance # for Brilinta. Place 2 bottles of samples up front for pt to  while he figures out if he can get assistance. Advised the pt to call the office and let us know if the assistance program is able to help him and if not will talk to Dr. Cally Carver about switching the pt to a more affordable medication. Pt verbalized understanding.

## 2023-08-07 NOTE — TELEPHONE ENCOUNTER
Patient called asking if there was a more affordable option for his BRILINTA medication because he can no longer afford it. Please return call at earliest convenience.     [phone]

## 2023-08-07 NOTE — TELEPHONE ENCOUNTER
[facility] they advised the pt to apply for Medicaid so they can help pt. Pt explained that he talked to GEB at his last OV and GEB told the pt he may switch to Plavix if ever pt can't afford the  [place]. Will consult with GEB when he returns. Pt verbalized understanding.

## 2023-08-07 NOTE — TELEPHONE ENCOUNTER
Medication:   Requested Prescriptions     Pending Prescriptions Disp Refills    gabapentin (NEURONTIN) 300 MG capsule [Pharmacy Med Name: GABAPENTIN 300MG CAPSULES] 120 capsule 2     Sig: TAKE 2 CAPSULES BY MOUTH TWICE DAILY        Last Filled:      Patient Phone Number: [phone] (home)     Last appt: 4/5/2023   Next appt: Visit date not found    Last OARRS:   RX Monitoring 5/3/2019   Attestation The Prescription Monitoring Report for this patient was reviewed today. Periodic Controlled Substance Monitoring Possible medication side effects, risk of tolerance/dependence & alternative treatments discussed. ;No signs of potential drug abuse or diversion identified: otherwise, see note documentation

## 2023-08-24 ENCOUNTER — APPOINTMENT (OUTPATIENT)
Dept: ULTRASOUND IMAGING | Age: 48
DRG: 872 | End: 2023-08-24

## 2023-08-24 ENCOUNTER — APPOINTMENT (OUTPATIENT)
Dept: GENERAL RADIOLOGY | Age: 48
DRG: 872 | End: 2023-08-24

## 2023-08-24 ENCOUNTER — HOSPITAL ENCOUNTER (INPATIENT)
Age: 48
LOS: 2 days | Discharge: HOME | DRG: 872 | End: 2023-08-26
Attending: EMERGENCY MEDICINE | Admitting: STUDENT IN AN ORGANIZED HEALTH CARE EDUCATION/TRAINING PROGRAM

## 2023-08-24 DIAGNOSIS — L03.111 CELLULITIS OF RIGHT AXILLA: ICD-10-CM

## 2023-08-24 DIAGNOSIS — R65.20 SEVERE SEPSIS (HCC): Primary | ICD-10-CM

## 2023-08-24 DIAGNOSIS — A41.9 SEVERE SEPSIS (HCC): Primary | ICD-10-CM

## 2023-08-24 LAB
ALBUMIN SERPL-MCNC: 3.7 G/DL (ref 3.4–5)
ALBUMIN/GLOB SERPL: 1.2 {RATIO} (ref 1.1–2.2)
ALP SERPL-CCNC: 213 U/L (ref 40–129)
ALT SERPL-CCNC: 70 U/L (ref 10–40)
ANION GAP SERPL CALCULATED.3IONS-SCNC: 14 MMOL/L (ref 3–16)
ANISOCYTOSIS BLD QL SMEAR: ABNORMAL
AST SERPL-CCNC: 54 U/L (ref 15–37)
BASOPHILS # BLD: 0 K/UL (ref 0–0.2)
BASOPHILS NFR BLD: 0 %
BILIRUB SERPL-MCNC: 0.3 MG/DL (ref 0–1)
BUN SERPL-MCNC: 22 MG/DL (ref 7–20)
CALCIUM SERPL-MCNC: 9.7 MG/DL (ref 8.3–10.6)
CHLORIDE SERPL-SCNC: 96 MMOL/L (ref 99–110)
CO2 SERPL-SCNC: 24 MMOL/L (ref 21–32)
CREAT SERPL-MCNC: 1.2 MG/DL (ref 0.9–1.3)
DEPRECATED RDW RBC AUTO: 16.9 % (ref 12.4–15.4)
EOSINOPHIL # BLD: 0.3 K/UL (ref 0–0.6)
EOSINOPHIL NFR BLD: 3 %
GFR SERPLBLD CREATININE-BSD FMLA CKD-EPI: >60 ML/MIN/{1.73_M2}
GLUCOSE BLD-MCNC: 246 MG/DL (ref 70–99)
GLUCOSE BLD-MCNC: 252 MG/DL (ref 70–99)
GLUCOSE BLD-MCNC: 310 MG/DL (ref 70–99)
GLUCOSE SERPL-MCNC: 398 MG/DL (ref 70–99)
HCT VFR BLD AUTO: 38.3 % (ref 40.5–52.5)
HGB BLD-MCNC: 12.9 G/DL (ref 13.5–17.5)
LACTATE BLDV-SCNC: 2.2 MMOL/L (ref 0.4–1.9)
LACTATE BLDV-SCNC: 3.1 MMOL/L (ref 0.4–1.9)
LYMPHOCYTES # BLD: 2.2 K/UL (ref 1–5.1)
LYMPHOCYTES NFR BLD: 21 %
MACROCYTES BLD QL SMEAR: ABNORMAL
MCH RBC QN AUTO: 28.6 PG (ref 26–34)
MCHC RBC AUTO-ENTMCNC: 33.6 G/DL (ref 31–36)
MCV RBC AUTO: 85.1 FL (ref 80–100)
MONOCYTES # BLD: 0.6 K/UL (ref 0–1.3)
MONOCYTES NFR BLD: 6 %
NEUTROPHILS # BLD: 7.2 K/UL (ref 1.7–7.7)
NEUTROPHILS NFR BLD: 70 %
PERFORMED ON: ABNORMAL
PLATELET # BLD AUTO: 246 K/UL (ref 135–450)
PMV BLD AUTO: 7.4 FL (ref 5–10.5)
POLYCHROMASIA BLD QL SMEAR: ABNORMAL
POTASSIUM SERPL-SCNC: 4 MMOL/L (ref 3.5–5.1)
PROCALCITONIN SERPL IA-MCNC: 0.25 NG/ML (ref 0–0.15)
PROT SERPL-MCNC: 6.8 G/DL (ref 6.4–8.2)
RBC # BLD AUTO: 4.5 M/UL (ref 4.2–5.9)
SODIUM SERPL-SCNC: 134 MMOL/L (ref 136–145)
TARGETS BLD QL SMEAR: ABNORMAL
WBC # BLD AUTO: 10.3 K/UL (ref 4–11)

## 2023-08-24 PROCEDURE — 96361 HYDRATE IV INFUSION ADD-ON: CPT

## 2023-08-24 PROCEDURE — 87070 CULTURE OTHR SPECIMN AEROBIC: CPT

## 2023-08-24 PROCEDURE — 6370000000 HC RX 637 (ALT 250 FOR IP): Performed by: STUDENT IN AN ORGANIZED HEALTH CARE EDUCATION/TRAINING PROGRAM

## 2023-08-24 PROCEDURE — 87186 SC STD MICRODIL/AGAR DIL: CPT

## 2023-08-24 PROCEDURE — 85025 COMPLETE CBC W/AUTO DIFF WBC: CPT

## 2023-08-24 PROCEDURE — 87040 BLOOD CULTURE FOR BACTERIA: CPT

## 2023-08-24 PROCEDURE — 6360000002 HC RX W HCPCS: Performed by: STUDENT IN AN ORGANIZED HEALTH CARE EDUCATION/TRAINING PROGRAM

## 2023-08-24 PROCEDURE — 84145 PROCALCITONIN (PCT): CPT

## 2023-08-24 PROCEDURE — 6360000002 HC RX W HCPCS: Performed by: EMERGENCY MEDICINE

## 2023-08-24 PROCEDURE — 2580000003 HC RX 258: Performed by: STUDENT IN AN ORGANIZED HEALTH CARE EDUCATION/TRAINING PROGRAM

## 2023-08-24 PROCEDURE — 2580000003 HC RX 258: Performed by: EMERGENCY MEDICINE

## 2023-08-24 PROCEDURE — 87077 CULTURE AEROBIC IDENTIFY: CPT

## 2023-08-24 PROCEDURE — 71045 X-RAY EXAM CHEST 1 VIEW: CPT

## 2023-08-24 PROCEDURE — 96374 THER/PROPH/DIAG INJ IV PUSH: CPT

## 2023-08-24 PROCEDURE — 80053 COMPREHEN METABOLIC PANEL: CPT

## 2023-08-24 PROCEDURE — 36415 COLL VENOUS BLD VENIPUNCTURE: CPT

## 2023-08-24 PROCEDURE — 76705 ECHO EXAM OF ABDOMEN: CPT

## 2023-08-24 PROCEDURE — 99285 EMERGENCY DEPT VISIT HI MDM: CPT

## 2023-08-24 PROCEDURE — 1200000000 HC SEMI PRIVATE

## 2023-08-24 PROCEDURE — 83605 ASSAY OF LACTIC ACID: CPT

## 2023-08-24 RX ORDER — INSULIN LISPRO 100 [IU]/ML
0-4 INJECTION, SOLUTION INTRAVENOUS; SUBCUTANEOUS
Status: DISCONTINUED | OUTPATIENT
Start: 2023-08-24 | End: 2023-08-26 | Stop reason: HOSPADM

## 2023-08-24 RX ORDER — HYDRALAZINE HYDROCHLORIDE 20 MG/ML
10 INJECTION INTRAMUSCULAR; INTRAVENOUS EVERY 6 HOURS PRN
Status: DISCONTINUED | OUTPATIENT
Start: 2023-08-24 | End: 2023-08-26 | Stop reason: HOSPADM

## 2023-08-24 RX ORDER — SODIUM CHLORIDE, SODIUM LACTATE, POTASSIUM CHLORIDE, AND CALCIUM CHLORIDE .6; .31; .03; .02 G/100ML; G/100ML; G/100ML; G/100ML
2000 INJECTION, SOLUTION INTRAVENOUS ONCE
Status: COMPLETED | OUTPATIENT
Start: 2023-08-24 | End: 2023-08-24

## 2023-08-24 RX ORDER — BUPROPION HYDROCHLORIDE 150 MG/1
150 TABLET, EXTENDED RELEASE ORAL 2 TIMES DAILY
Status: DISCONTINUED | OUTPATIENT
Start: 2023-08-24 | End: 2023-08-26 | Stop reason: HOSPADM

## 2023-08-24 RX ORDER — SODIUM CHLORIDE, SODIUM LACTATE, POTASSIUM CHLORIDE, AND CALCIUM CHLORIDE .6; .31; .03; .02 G/100ML; G/100ML; G/100ML; G/100ML
5029 INJECTION, SOLUTION INTRAVENOUS ONCE
Status: DISCONTINUED | OUTPATIENT
Start: 2023-08-24 | End: 2023-08-24

## 2023-08-24 RX ORDER — CLOPIDOGREL BISULFATE 75 MG/1
75 TABLET ORAL DAILY
Status: DISCONTINUED | OUTPATIENT
Start: 2023-08-24 | End: 2023-08-26 | Stop reason: HOSPADM

## 2023-08-24 RX ORDER — ONDANSETRON 4 MG/1
4 TABLET, ORALLY DISINTEGRATING ORAL EVERY 8 HOURS PRN
Status: DISCONTINUED | OUTPATIENT
Start: 2023-08-24 | End: 2023-08-26 | Stop reason: HOSPADM

## 2023-08-24 RX ORDER — ATORVASTATIN CALCIUM 40 MG/1
40 TABLET, FILM COATED ORAL NIGHTLY
Status: DISCONTINUED | OUTPATIENT
Start: 2023-08-24 | End: 2023-08-26 | Stop reason: HOSPADM

## 2023-08-24 RX ORDER — SODIUM CHLORIDE, SODIUM LACTATE, POTASSIUM CHLORIDE, AND CALCIUM CHLORIDE .6; .31; .03; .02 G/100ML; G/100ML; G/100ML; G/100ML
500 INJECTION, SOLUTION INTRAVENOUS ONCE
Status: COMPLETED | OUTPATIENT
Start: 2023-08-24 | End: 2023-08-24

## 2023-08-24 RX ORDER — SODIUM CHLORIDE 0.9 % (FLUSH) 0.9 %
5-40 SYRINGE (ML) INJECTION EVERY 12 HOURS SCHEDULED
Status: DISCONTINUED | OUTPATIENT
Start: 2023-08-24 | End: 2023-08-26 | Stop reason: HOSPADM

## 2023-08-24 RX ORDER — INSULIN LISPRO 100 [IU]/ML
0-4 INJECTION, SOLUTION INTRAVENOUS; SUBCUTANEOUS NIGHTLY
Status: DISCONTINUED | OUTPATIENT
Start: 2023-08-24 | End: 2023-08-26 | Stop reason: HOSPADM

## 2023-08-24 RX ORDER — SODIUM CHLORIDE 0.9 % (FLUSH) 0.9 %
5-40 SYRINGE (ML) INJECTION PRN
Status: DISCONTINUED | OUTPATIENT
Start: 2023-08-24 | End: 2023-08-26 | Stop reason: HOSPADM

## 2023-08-24 RX ORDER — SODIUM CHLORIDE 9 MG/ML
INJECTION, SOLUTION INTRAVENOUS PRN
Status: DISCONTINUED | OUTPATIENT
Start: 2023-08-24 | End: 2023-08-26 | Stop reason: HOSPADM

## 2023-08-24 RX ORDER — ASCORBIC ACID 500 MG
250 TABLET ORAL DAILY
Status: DISCONTINUED | OUTPATIENT
Start: 2023-08-24 | End: 2023-08-26 | Stop reason: HOSPADM

## 2023-08-24 RX ORDER — ACETAMINOPHEN 325 MG/1
650 TABLET ORAL EVERY 6 HOURS PRN
Status: DISCONTINUED | OUTPATIENT
Start: 2023-08-24 | End: 2023-08-26 | Stop reason: HOSPADM

## 2023-08-24 RX ORDER — DEXTROSE MONOHYDRATE 100 MG/ML
INJECTION, SOLUTION INTRAVENOUS CONTINUOUS PRN
Status: DISCONTINUED | OUTPATIENT
Start: 2023-08-24 | End: 2023-08-26 | Stop reason: HOSPADM

## 2023-08-24 RX ORDER — POLYETHYLENE GLYCOL 3350 17 G/17G
17 POWDER, FOR SOLUTION ORAL DAILY PRN
Status: DISCONTINUED | OUTPATIENT
Start: 2023-08-24 | End: 2023-08-26 | Stop reason: HOSPADM

## 2023-08-24 RX ORDER — GABAPENTIN 300 MG/1
600 CAPSULE ORAL 2 TIMES DAILY
Status: DISCONTINUED | OUTPATIENT
Start: 2023-08-24 | End: 2023-08-26 | Stop reason: HOSPADM

## 2023-08-24 RX ORDER — NITROGLYCERIN 0.4 MG/1
0.4 TABLET SUBLINGUAL EVERY 5 MIN PRN
Status: DISCONTINUED | OUTPATIENT
Start: 2023-08-24 | End: 2023-08-26 | Stop reason: HOSPADM

## 2023-08-24 RX ORDER — ENOXAPARIN SODIUM 100 MG/ML
60 INJECTION SUBCUTANEOUS 2 TIMES DAILY
Status: DISCONTINUED | OUTPATIENT
Start: 2023-08-24 | End: 2023-08-26 | Stop reason: HOSPADM

## 2023-08-24 RX ORDER — ONDANSETRON 2 MG/ML
4 INJECTION INTRAMUSCULAR; INTRAVENOUS EVERY 6 HOURS PRN
Status: DISCONTINUED | OUTPATIENT
Start: 2023-08-24 | End: 2023-08-26 | Stop reason: HOSPADM

## 2023-08-24 RX ORDER — ACETAMINOPHEN 650 MG/1
650 SUPPOSITORY RECTAL EVERY 6 HOURS PRN
Status: DISCONTINUED | OUTPATIENT
Start: 2023-08-24 | End: 2023-08-26 | Stop reason: HOSPADM

## 2023-08-24 RX ORDER — ASPIRIN 81 MG/1
81 TABLET ORAL DAILY
Status: DISCONTINUED | OUTPATIENT
Start: 2023-08-24 | End: 2023-08-26 | Stop reason: HOSPADM

## 2023-08-24 RX ADMIN — CLOPIDOGREL BISULFATE 75 MG: 75 TABLET ORAL at 18:24

## 2023-08-24 RX ADMIN — VANCOMYCIN HYDROCHLORIDE 2500 MG: 10 INJECTION, POWDER, LYOPHILIZED, FOR SOLUTION INTRAVENOUS at 13:42

## 2023-08-24 RX ADMIN — INSULIN HUMAN 10 UNITS: 100 INJECTION, SOLUTION PARENTERAL at 18:24

## 2023-08-24 RX ADMIN — SODIUM CHLORIDE, PRESERVATIVE FREE 10 ML: 5 INJECTION INTRAVENOUS at 20:18

## 2023-08-24 RX ADMIN — ASPIRIN 81 MG: 81 TABLET, COATED ORAL at 18:25

## 2023-08-24 RX ADMIN — SODIUM CHLORIDE, POTASSIUM CHLORIDE, SODIUM LACTATE AND CALCIUM CHLORIDE 1000 ML: 600; 310; 30; 20 INJECTION, SOLUTION INTRAVENOUS at 13:45

## 2023-08-24 RX ADMIN — ENOXAPARIN SODIUM 60 MG: 100 INJECTION SUBCUTANEOUS at 20:17

## 2023-08-24 RX ADMIN — SODIUM CHLORIDE, PRESERVATIVE FREE 10 ML: 5 INJECTION INTRAVENOUS at 18:24

## 2023-08-24 RX ADMIN — METOPROLOL TARTRATE 50 MG: 25 TABLET, FILM COATED ORAL at 20:16

## 2023-08-24 RX ADMIN — CEFEPIME 2000 MG: 2 INJECTION, POWDER, FOR SOLUTION INTRAVENOUS at 12:38

## 2023-08-24 RX ADMIN — BUPROPION HYDROCHLORIDE 150 MG: 150 TABLET, EXTENDED RELEASE ORAL at 20:17

## 2023-08-24 RX ADMIN — INSULIN LISPRO 2 UNITS: 100 INJECTION, SOLUTION INTRAVENOUS; SUBCUTANEOUS at 18:23

## 2023-08-24 RX ADMIN — OXYCODONE HYDROCHLORIDE AND ACETAMINOPHEN 250 MG: 500 TABLET ORAL at 18:24

## 2023-08-24 RX ADMIN — SERTRALINE HYDROCHLORIDE 50 MG: 50 TABLET ORAL at 18:24

## 2023-08-24 RX ADMIN — ATORVASTATIN CALCIUM 40 MG: 40 TABLET, FILM COATED ORAL at 20:17

## 2023-08-24 RX ADMIN — INSULIN LISPRO 3 UNITS: 100 INJECTION, SOLUTION INTRAVENOUS; SUBCUTANEOUS at 15:19

## 2023-08-24 RX ADMIN — HYDRALAZINE HYDROCHLORIDE 10 MG: 20 INJECTION INTRAMUSCULAR; INTRAVENOUS at 18:24

## 2023-08-24 RX ADMIN — SODIUM CHLORIDE, POTASSIUM CHLORIDE, SODIUM LACTATE AND CALCIUM CHLORIDE 500 ML: 600; 310; 30; 20 INJECTION, SOLUTION INTRAVENOUS at 11:34

## 2023-08-24 RX ADMIN — GABAPENTIN 600 MG: 300 CAPSULE ORAL at 20:17

## 2023-08-24 ASSESSMENT — PAIN SCALES - GENERAL
PAINLEVEL_OUTOF10: 5
PAINLEVEL_OUTOF10: 5

## 2023-08-24 ASSESSMENT — PAIN DESCRIPTION - ORIENTATION
ORIENTATION: RIGHT
ORIENTATION: RIGHT

## 2023-08-24 ASSESSMENT — LIFESTYLE VARIABLES
HOW OFTEN DO YOU HAVE A DRINK CONTAINING ALCOHOL: MONTHLY OR LESS
HOW MANY STANDARD DRINKS CONTAINING ALCOHOL DO YOU HAVE ON A TYPICAL DAY: PATIENT DOES NOT DRINK

## 2023-08-24 ASSESSMENT — PAIN DESCRIPTION - DESCRIPTORS
DESCRIPTORS: BURNING
DESCRIPTORS: BURNING

## 2023-08-24 ASSESSMENT — ENCOUNTER SYMPTOMS
DIARRHEA: 0
COUGH: 0
WHEEZING: 0
SHORTNESS OF BREATH: 0
EYE PAIN: 0
ABDOMINAL PAIN: 0
NAUSEA: 0
VOMITING: 0

## 2023-08-24 ASSESSMENT — PAIN DESCRIPTION - LOCATION
LOCATION: ARM
LOCATION: OTHER (COMMENT)

## 2023-08-24 ASSESSMENT — PAIN DESCRIPTION - PAIN TYPE
TYPE: ACUTE PAIN
TYPE: ACUTE PAIN

## 2023-08-24 ASSESSMENT — PAIN DESCRIPTION - ONSET: ONSET: ON-GOING

## 2023-08-24 ASSESSMENT — PAIN - FUNCTIONAL ASSESSMENT: PAIN_FUNCTIONAL_ASSESSMENT: ACTIVITIES ARE NOT PREVENTED

## 2023-08-24 ASSESSMENT — PAIN DESCRIPTION - FREQUENCY: FREQUENCY: CONTINUOUS

## 2023-08-24 NOTE — H&P
Hospital Medicine History & Physical      Date of Admission: 8/24/2023    Date of Service:  8/24/2023    [x]Admitted to Inpatient with expected LOS greater than two midnights due to medical therapy. []Placed in Observation status. Chief Admission Complaint: Right axillary wound    Presenting Admission History: This is a 50-year-old male with past medical history of type 2 diabetes mellitus, CAD, hypertension heart failure with preserved ejection fraction, anxiety/depression, CHARLIE on CPAP, morbid obesity who presented to the emergency department for right axillary wound check. Patient states about a week ago he developed redness around his right axillary site. Eventually that erythema site started swelling. 3 days ago the swelling popped. Patient had watery/bloody drainage. Denies any purulent material drainage. Denies any fevers or chills. His wife has been cleaning it with saline and iodine. Today wife noted redness around the area was increasing prompting him to come to the emergency department. Patient denies any chest pain, shortness of breath, abdominal pain, nausea, vomiting, fevers or chills. In the emergency department patient was hypertensive tachycardic otherwise hemodynamically stable. Labs revealing mild hyponatremia, hyperglycemia, elevated LFTs. No leukocytosis. Chest x-ray with no acute cardiopulmonary abnormalities. Assessment/Plan:      #Right axillary cellulitis/ulcer  #Sepsis per SIRS criteria secondary to above lactic acidosis, tachycardia  -Given sepsis bolus/broad-spectrum antibiotics in the emergency department  -Follow-up blood culture, wound culture  -Continue antibiotics  -Hold off of IV fluids per vent overload in the setting of HFpEF  -Monitor CBC  -Wound care    #Elevated LFTs  Possible SPRAGUE?   Patient is asymptomatic  We will obtain right upper quadrant ultrasound  Continue to monitor LFTs    #Type 2 diabetes mellitus  #Hyperglycemia  A1c back in March 2023 PROBNP, TROPHS in the last 72 hours. No results for input(s): LABA1C in the last 72 hours. Recent Labs     08/24/23  1136   AST 54*   ALT 70*   BILITOT 0.3   ALKPHOS 213*     No results for input(s): INR, LACTA, TSH in the last 72 hours.      Khang Alvaraod MD

## 2023-08-24 NOTE — ED NOTES
ED TO INPATIENT SBAR HANDOFF    Patient Name: Dat Oglesby   :  1975  53 y.o. MRN:  [id number]  Preferred Name  Stacy  ED Room #:  V95/B97-58  Family/Caregiver Present no   Restraints no   Sitter no   Sepsis Risk Score Sepsis Risk Score: 4.95    Situation  Code Status: Prior No additional code details. Allergies: Patient has no known allergies. Weight: Patient Vitals for the past 96 hrs (Last 3 readings):   Weight   23 1129 (!) 406 lb 3.2 oz (184.3 kg)     Arrived from: home  Chief Complaint:   Chief Complaint   Patient presents with    Wound Check     R armpit wound, Pt had small abscess in R armpit that popped roughly 2 days ago, wound is now open and painful      Hospital Problem/Diagnosis:  Principal Problem:    Sepsis ([address])  Resolved Problems:    * No resolved hospital problems. *    Imaging:   US ABDOMEN LIMITED Specify organ? LIVER, GALLBLADDER   Final Result      Hepatomegaly with fatty infiltration. XR CHEST PORTABLE   Final Result   No acute abnormality.         Abnormal labs:   Abnormal Labs Reviewed   CBC WITH AUTO DIFFERENTIAL - Abnormal; Notable for the following components:       Result Value    Hemoglobin 12.9 (*)     Hematocrit 38.3 (*)     RDW 16.9 (*)     Anisocytosis Occasional (*)     Macrocytes Occasional (*)     Polychromasia Occasional (*)     Target Cells Occasional (*)     All other components within normal limits   COMPREHENSIVE METABOLIC PANEL W/ REFLEX TO MG FOR LOW K - Abnormal; Notable for the following components:    Sodium 134 (*)     Chloride 96 (*)     Glucose 398 (*)     BUN 22 (*)     Alkaline Phosphatase 213 (*)     ALT 70 (*)     AST 54 (*)     All other components within normal limits   LACTATE, SEPSIS - Abnormal; Notable for the following components:    Lactic Acid, Sepsis 3.1 (*)     All other components within normal limits   LACTATE, SEPSIS - Abnormal; Notable for the following components:    Lactic Acid, Sepsis 2.2 (*)     All other components within normal limits   PROCALCITONIN - Abnormal; Notable for the following components:    Procalcitonin 0.25 (*)     All other components within normal limits   POCT GLUCOSE - Abnormal; Notable for the following components:    POC Glucose 310 (*)     All other components within normal limits     Critical values: no     Abnormal Assessment Findings: Open wound R axilla,     Background  History:   Past Medical History:   Diagnosis Date    Abdominal hernia     Alcohol abuse 11/12/2016    Anxiety     CAD S/P percutaneous coronary angioplasty     Clostridium difficile diarrhea 4/8/15    Clostridium difficile diarrhea 6/19/15    DVT (deep venous thrombosis) (HCC)     DVT of axillary vein, acute left (HCC)     Gout     H/O ulcer disease     Hernia     History of blood transfusion     Hyperlipemia 5/17/2013    Hypertension     Hypertension, essential     Kidney congenitally absent, left     Kidney disease     BORN WITHOUT LEFT KIDNEY NO PROBLEMS    Lightheaded     Neuropathy 3/10/2023    Obesity 5/17/2013    Obstructive sleep apnea (adult) (pediatric)     Renal agenesis     Severe single current episode of major depressive disorder, without psychotic features ([address]) 11/12/2016       Assessment    Vitals/MEWS: MEWS Score: 4  Level of Consciousness: Alert (0)   Vitals:    08/24/23 1345 08/24/23 1400 08/24/23 1415 08/24/23 1530   BP: (!) 158/82 (!) 149/75  137/68   Pulse:    91   Resp:    16   Temp:       TempSrc:       SpO2: 95% 96% 96% 95%   Weight:       Height:         FiO2 (%): 99  O2 Flow Rate:      Cardiac Rhythm:    Pain Assessment: 5 [x] Verbal [] Kyle Means Scale  Pain Scale: Pain Assessment  Pain Assessment: 0-10  Pain Level: 5  Patient's Stated Pain Goal: 0 - No pain  Pain Location: Other (Comment)  Pain Orientation: Right  Pain Descriptors: Burning  Pain Type: Acute pain  Last documented pain score (0-10 scale) Pain Level: 5  Last documented pain medication administered: na  Mental Status: oriented

## 2023-08-24 NOTE — CONSULTS
Clinical Pharmacy Consult Note    Vancomycin - Management by Pharmacy    Consult Date(s): 8/24/23  Consulting Provider(s): Dr. Yusuf Hayden / Plan  SSTI (R axillary wound/cellulitis) - Vancomycin  Concurrent Antimicrobials: Ceftriaxone 1 g IV q24h  Day of Vanc Therapy / Ordered Duration: Day 1 of 5  Current Dosing Method: Bayesian-Guided AUC Dosing  Therapeutic Goal: -600 mg/L*hr  Current Dose / Plan:   Pt's SCr at/near baseline today at 1.2 mg/dL per chart review  Pt received maximum loading dose of vancomycin today while in ED (2500 mg IV x1)  Will plan to initiate maintenance regimen of vancomycin 1250 mg IV q12h later this evening  Selected regimen predicts AUC of 548 mg/L*hr with steady state trough of 15.2 mg/L   Will plan to obtain a random level in 2 days (8/26, not yet ordered) to assess kinetics or sooner if clinically indicated  Will continue to monitor clinical condition and make adjustments to regimen as appropriate    Thank you for consulting pharmacy,    Basil Herbert, PharmD, [facility]  Clinical Pharmacist - Emergency Dept  Wireless: [phone]  8/24/2023 2:28 PM      Interval update:  Initiation    Subjective/Objective: Edy Menjivar is a 53 y.o. male with a PMHx significant for T2DM, CAD, HTN, HFpEF, anxiety, depression, and CHARLIE who is admitted with R axillary wound. Pharmacy is consulted to dose vancomycin. Ht Readings from Last 1 Encounters:   08/24/23 5' 7\" (1.702 m)     Wt Readings from Last 1 Encounters:   08/24/23 (!) 406 lb 3.2 oz (184.3 kg)     Current & Prior Antimicrobial Regimen(s):  Vancomycin IV PTD (8/24-current)  Ceftriaxone 1 g IV q24h (8/24-current)    Vancomycin Level(s) / Doses:  Date Time Dose Type of Level / Level Interpretation   8/26 0600 1250 mg IV q12h Random =            Note: Serum levels collected for AUC-based dosing may be high if collected in close proximity to the dose administered. This is not necessarily indicative of toxicity.     Cultures &

## 2023-08-24 NOTE — DISCHARGE INSTRUCTIONS
Keep your wound covered and change the dressing every day. Clean with soap and water 2 times a day. Extra Heart Failure sites:     https://Dream [employer]inners. Arcivr/publication/?e=466926   --- this is American Heart Association interactive Healthier Living with Heart Failure guidebook. Please click hyperlink or copy / paste link into search bar. Use your mouse to scroll through the pages. Lots of information about weight monitoring, diet tips, activity, meds, etc    HF [place] laureen  -- this is a free smart phone laureen available for iPhone and Android download. Use your phone to track sodium / fluid intake, zone tool symptom tracking, weights, medications, etc. Click on this hyperlink  HF [place] Laureen   for QR code for easy download. DASH (Dietary Approach to Stop Hypertension) diet --  SeekAlumni.no -- this diet is a flexible eating plan that promotes heart healthy eating style. Click on hyperlink or copy / paste link into search bar. Lots of low sodium recipes and tips.     CigarRepair.ca  -- more free recipes

## 2023-08-25 LAB
ALBUMIN SERPL-MCNC: 3.4 G/DL (ref 3.4–5)
ALBUMIN/GLOB SERPL: 1.2 {RATIO} (ref 1.1–2.2)
ALP SERPL-CCNC: 160 U/L (ref 40–129)
ALT SERPL-CCNC: 65 U/L (ref 10–40)
ANION GAP SERPL CALCULATED.3IONS-SCNC: 9 MMOL/L (ref 3–16)
ANISOCYTOSIS BLD QL SMEAR: ABNORMAL
AST SERPL-CCNC: 60 U/L (ref 15–37)
BACTERIA BLD CULT ORG #2: NORMAL
BACTERIA BLD CULT: NORMAL
BASOPHILS # BLD: 0 K/UL (ref 0–0.2)
BASOPHILS NFR BLD: 0 %
BILIRUB SERPL-MCNC: 0.4 MG/DL (ref 0–1)
BUN SERPL-MCNC: 15 MG/DL (ref 7–20)
CALCIUM SERPL-MCNC: 9.5 MG/DL (ref 8.3–10.6)
CHLORIDE SERPL-SCNC: 101 MMOL/L (ref 99–110)
CO2 SERPL-SCNC: 27 MMOL/L (ref 21–32)
CREAT SERPL-MCNC: 0.9 MG/DL (ref 0.9–1.3)
DEPRECATED RDW RBC AUTO: 16.9 % (ref 12.4–15.4)
EOSINOPHIL # BLD: 0.2 K/UL (ref 0–0.6)
EOSINOPHIL NFR BLD: 2 %
GFR SERPLBLD CREATININE-BSD FMLA CKD-EPI: >60 ML/MIN/{1.73_M2}
GLUCOSE BLD-MCNC: 178 MG/DL (ref 70–99)
GLUCOSE BLD-MCNC: 200 MG/DL (ref 70–99)
GLUCOSE BLD-MCNC: 211 MG/DL (ref 70–99)
GLUCOSE BLD-MCNC: 239 MG/DL (ref 70–99)
GLUCOSE SERPL-MCNC: 171 MG/DL (ref 70–99)
HCT VFR BLD AUTO: 36.6 % (ref 40.5–52.5)
HGB BLD-MCNC: 12.2 G/DL (ref 13.5–17.5)
LYMPHOCYTES # BLD: 2 K/UL (ref 1–5.1)
LYMPHOCYTES NFR BLD: 24 %
MCH RBC QN AUTO: 28.6 PG (ref 26–34)
MCHC RBC AUTO-ENTMCNC: 33.3 G/DL (ref 31–36)
MCV RBC AUTO: 86 FL (ref 80–100)
MONOCYTES # BLD: 0.3 K/UL (ref 0–1.3)
MONOCYTES NFR BLD: 3 %
NEUTROPHILS # BLD: 6 K/UL (ref 1.7–7.7)
NEUTROPHILS NFR BLD: 68 %
NEUTS BAND NFR BLD MANUAL: 3 % (ref 0–7)
PERFORMED ON: ABNORMAL
PLATELET # BLD AUTO: 218 K/UL (ref 135–450)
PLATELET BLD QL SMEAR: ADEQUATE
PMV BLD AUTO: 7.6 FL (ref 5–10.5)
POTASSIUM SERPL-SCNC: 3.7 MMOL/L (ref 3.5–5.1)
PROT SERPL-MCNC: 6.2 G/DL (ref 6.4–8.2)
RBC # BLD AUTO: 4.26 M/UL (ref 4.2–5.9)
SODIUM SERPL-SCNC: 137 MMOL/L (ref 136–145)
WBC # BLD AUTO: 8.4 K/UL (ref 4–11)

## 2023-08-25 PROCEDURE — 85025 COMPLETE CBC W/AUTO DIFF WBC: CPT

## 2023-08-25 PROCEDURE — 6370000000 HC RX 637 (ALT 250 FOR IP): Performed by: STUDENT IN AN ORGANIZED HEALTH CARE EDUCATION/TRAINING PROGRAM

## 2023-08-25 PROCEDURE — 80053 COMPREHEN METABOLIC PANEL: CPT

## 2023-08-25 PROCEDURE — 36415 COLL VENOUS BLD VENIPUNCTURE: CPT

## 2023-08-25 PROCEDURE — 94761 N-INVAS EAR/PLS OXIMETRY MLT: CPT

## 2023-08-25 PROCEDURE — 2580000003 HC RX 258: Performed by: STUDENT IN AN ORGANIZED HEALTH CARE EDUCATION/TRAINING PROGRAM

## 2023-08-25 PROCEDURE — 5A09357 ASSISTANCE WITH RESPIRATORY VENTILATION, LESS THAN 24 CONSECUTIVE HOURS, CONTINUOUS POSITIVE AIRWAY PRESSURE: ICD-10-PCS | Performed by: HOSPITALIST

## 2023-08-25 PROCEDURE — 6370000000 HC RX 637 (ALT 250 FOR IP): Performed by: NURSE PRACTITIONER

## 2023-08-25 PROCEDURE — 6360000002 HC RX W HCPCS: Performed by: STUDENT IN AN ORGANIZED HEALTH CARE EDUCATION/TRAINING PROGRAM

## 2023-08-25 PROCEDURE — 1200000000 HC SEMI PRIVATE

## 2023-08-25 PROCEDURE — 94660 CPAP INITIATION&MGMT: CPT

## 2023-08-25 RX ORDER — LOSARTAN POTASSIUM 50 MG/1
50 TABLET ORAL DAILY
Status: DISCONTINUED | OUTPATIENT
Start: 2023-08-25 | End: 2023-08-26 | Stop reason: HOSPADM

## 2023-08-25 RX ADMIN — CLOPIDOGREL BISULFATE 75 MG: 75 TABLET ORAL at 08:04

## 2023-08-25 RX ADMIN — INSULIN HUMAN 10 UNITS: 100 INJECTION, SOLUTION PARENTERAL at 18:03

## 2023-08-25 RX ADMIN — METOPROLOL TARTRATE 50 MG: 25 TABLET, FILM COATED ORAL at 20:23

## 2023-08-25 RX ADMIN — GABAPENTIN 600 MG: 300 CAPSULE ORAL at 20:22

## 2023-08-25 RX ADMIN — VANCOMYCIN HYDROCHLORIDE 1250 MG: 10 INJECTION, POWDER, LYOPHILIZED, FOR SOLUTION INTRAVENOUS at 00:36

## 2023-08-25 RX ADMIN — ATORVASTATIN CALCIUM 40 MG: 40 TABLET, FILM COATED ORAL at 20:23

## 2023-08-25 RX ADMIN — SODIUM CHLORIDE: 9 INJECTION, SOLUTION INTRAVENOUS at 11:51

## 2023-08-25 RX ADMIN — ASPIRIN 81 MG: 81 TABLET, COATED ORAL at 08:04

## 2023-08-25 RX ADMIN — CEFTRIAXONE 1000 MG: 1 INJECTION, POWDER, FOR SOLUTION INTRAMUSCULAR; INTRAVENOUS at 11:53

## 2023-08-25 RX ADMIN — INSULIN LISPRO 1 UNITS: 100 INJECTION, SOLUTION INTRAVENOUS; SUBCUTANEOUS at 11:44

## 2023-08-25 RX ADMIN — SODIUM CHLORIDE, PRESERVATIVE FREE 10 ML: 5 INJECTION INTRAVENOUS at 20:23

## 2023-08-25 RX ADMIN — INSULIN HUMAN 10 UNITS: 100 INJECTION, SOLUTION PARENTERAL at 08:03

## 2023-08-25 RX ADMIN — OXYCODONE HYDROCHLORIDE AND ACETAMINOPHEN 250 MG: 500 TABLET ORAL at 08:04

## 2023-08-25 RX ADMIN — SODIUM CHLORIDE, PRESERVATIVE FREE 10 ML: 5 INJECTION INTRAVENOUS at 09:56

## 2023-08-25 RX ADMIN — INSULIN LISPRO 1 UNITS: 100 INJECTION, SOLUTION INTRAVENOUS; SUBCUTANEOUS at 18:04

## 2023-08-25 RX ADMIN — LOSARTAN POTASSIUM 50 MG: 50 TABLET, FILM COATED ORAL at 15:13

## 2023-08-25 RX ADMIN — ENOXAPARIN SODIUM 60 MG: 100 INJECTION SUBCUTANEOUS at 09:55

## 2023-08-25 RX ADMIN — GABAPENTIN 600 MG: 300 CAPSULE ORAL at 08:04

## 2023-08-25 RX ADMIN — BUPROPION HYDROCHLORIDE 150 MG: 150 TABLET, EXTENDED RELEASE ORAL at 20:22

## 2023-08-25 RX ADMIN — SERTRALINE HYDROCHLORIDE 50 MG: 50 TABLET ORAL at 08:04

## 2023-08-25 RX ADMIN — METOPROLOL TARTRATE 50 MG: 25 TABLET, FILM COATED ORAL at 08:04

## 2023-08-25 RX ADMIN — BUPROPION HYDROCHLORIDE 150 MG: 150 TABLET, EXTENDED RELEASE ORAL at 08:04

## 2023-08-25 RX ADMIN — ENOXAPARIN SODIUM 60 MG: 100 INJECTION SUBCUTANEOUS at 20:30

## 2023-08-25 RX ADMIN — INSULIN HUMAN 10 UNITS: 100 INJECTION, SOLUTION PARENTERAL at 11:45

## 2023-08-25 RX ADMIN — VANCOMYCIN HYDROCHLORIDE 1500 MG: 10 INJECTION, POWDER, LYOPHILIZED, FOR SOLUTION INTRAVENOUS at 12:58

## 2023-08-25 ASSESSMENT — PAIN SCALES - GENERAL
PAINLEVEL_OUTOF10: 0
PAINLEVEL_OUTOF10: 0

## 2023-08-25 NOTE — PROGRESS NOTES
V2.0    [facility] [place]      Name:  Sharon Jalloh /Age/Sex: 1975  (53 y.o. male)   MRN & CSN:  [id number] & [id number] Encounter Date/Time: 2023 12:33 PM EDT   Location:  [id number] PCP: Abbey Dominguez MD     Attending:Tom Wolff MD       Hospital Day: 2    Assessment and Recommendations   Sharon Jalloh is a 53 y.o. male with pmh of type 2 diabetes, heart failure with preserved ejection fraction, anxiety and depression, CHARLIE on CPAP, coronary artery disease, and morbid obesity who presented with Sepsis [facility])    Patient presented to emergency room for right axillary wound check. Patient stated that he noticed redness and swelling and started having drainage 3 days ago. He denied fevers or chills. The emergency room the patient was found to be tachycardic with an elevated lactic acid. Patient was initiated on vancomycin and cefepime and blood cultures were ordered. Plan:   Sepsis-secondary to right axillary wound. Lactate trended down to 3.1-2.2. Patient is on vancomycin and cefepime. Continue treatment follow blood cultures  Type 2 diabetes-hold home metformin. Sliding scale insulin  Hypertension-initially blood pressure medications were held. I did restart his metoprolol and losartan this has been hemodynamically stable. Will restart Lasix and spironolactone when appropriate. Heart failure with preserved ejection fraction-without exacerbation we will restart losartan and metoprolol. Restart Lasix and spironolactone when appropriate  Coronary artery disease-continue with Plavix metoprolol and Lipitor  Morbid obesity-patient's BMI is 65.3 places the patient at high risk for morbidity and mortality  CHARLIE- continue with CPAP      Diet ADULT DIET; Regular; 3 carb choices (45 gm/meal);  Low Sodium (2 gm); 1800 ml   DVT Prophylaxis [] Lovenox, []  Heparin, [] SCDs, [] Ambulation,  [] Eliquis, [] Xarelto  [] Coumadin   Code Status Full Code   Disposition From: home  Expected Recent Labs     08/24/23  1136 08/25/23  0708   WBC 10.3 8.4   HGB 12.9* 12.2*    218     BMP:    Recent Labs     08/24/23  1136 08/25/23  0708   * 137   K 4.0 3.7   CL 96* 101   CO2 24 27   BUN 22* 15   CREATININE 1.2 0.9   GLUCOSE 398* 171*     Hepatic:   Recent Labs     08/24/23  1136 08/25/23  0708   AST 54* 60*   ALT 70* 65*   BILITOT 0.3 0.4   ALKPHOS 213* 160*     Lipids:   Lab Results   Component Value Date/Time    CHOL 116 03/10/2023 11:59 AM    HDL 39 03/10/2023 11:59 AM    TRIG 119 03/10/2023 11:59 AM     Hemoglobin A1C:   Lab Results   Component Value Date/Time    LABA1C 6.0 10/13/2021 11:00 AM     TSH:   Lab Results   Component Value Date/Time    TSH 3.63 10/13/2021 11:00 AM     Troponin: No results found for: TROPONINT  Lactic Acid: No results for input(s): LACTA in the last 72 hours. BNP: No results for input(s): PROBNP in the last 72 hours.   UA:  Lab Results   Component Value Date/Time    NITRU Negative 02/05/2023 04:45 PM    COLORU Yellow 02/05/2023 04:45 PM    PHUR 6.0 02/05/2023 04:45 PM    WBCUA 10-20 02/05/2023 04:45 PM    RBCUA 0-2 02/05/2023 04:45 PM    BACTERIA 2+ 02/05/2023 04:45 PM    CLARITYU Clear 02/05/2023 04:45 PM    SPECGRAV >=1.030 02/05/2023 04:45 PM    LEUKOCYTESUR Negative 02/05/2023 04:45 PM    UROBILINOGEN 0.2 02/05/2023 04:45 PM    BILIRUBINUR Negative 02/05/2023 04:45 PM    BLOODU Negative 02/05/2023 04:45 PM    GLUCOSEU Negative 02/05/2023 04:45 PM    KETUA Negative 02/05/2023 04:45 PM    AMORPHOUS Rare 02/05/2023 04:45 PM     Urine Cultures:   Lab Results   Component Value Date/Time    LABURIN No growth at 18 to 36 hours 02/05/2023 09:52 PM     Blood Cultures: No results found for: BC  No results found for: BLOODCULT2  Organism: No results found for: [facility]      Electronically signed by SELMA Gatica CNP on 8/25/2023 at 12:33 PM

## 2023-08-25 NOTE — PROGRESS NOTES
Physician Progress Note      PATIENT:               Yumiko Mcneal  CSN #:                  [id number]  :                       1975  ADMIT DATE:       2023 10:42 AM  DISCH DATE:  RESPONDING  PROVIDER #:        Susana aLnd MD          QUERY TEXT:    Patient admitted with Right axillary cellulitis. Noted documentation of sepsis   in the H&P. In order to support the diagnosis of sepsis, please include   additional clinical indicators in your documentation. Or please document if   the diagnosis of sepsis has been ruled out after further study    The medical record reflects the following:  Risk Factors: Right axillary cellulitis/ulcer. Clinical Indicators: H&P: -Right axillary cellulitis/ulcer, Sepsis per SIRS   criteria secondary to above lactic acidosis, tachycardia. Lab values for WBC   10.3 - 8.4. Temps 96.9 - 98.3 . Lactate 3.1, Procalcitonin 0.25 on    noted. Treatment: blood cultures, Lactic acid, procalcitonin, IVF bolus  Options provided:  -- Sepsis present as evidenced by, Please document evidence.   -- Sepsis was ruled out after study  -- Other - I will add my own diagnosis  -- Disagree - Not applicable / Not valid  -- Disagree - Clinically unable to determine / Unknown  -- Refer to Clinical Documentation Reviewer    PROVIDER RESPONSE TEXT:    Sepsis is present as evidenced by lactic acid, and infection and tachycardia   on admission of 111    Query created by: Odilia Irwin on 2023 11:10 AM      Electronically signed by:  Susana Land MD 2023 12:16 PM

## 2023-08-25 NOTE — PLAN OF CARE
Problem: Discharge Planning  Goal: Discharge to home or other facility with appropriate resources  Outcome: Not Progressing     Problem: Pain  Goal: Verbalizes/displays adequate comfort level or baseline comfort level  Outcome: Progressing     Problem: ABCDS Injury Assessment  Goal: Absence of physical injury  Outcome: Progressing  Flowsheets (Taken 8/25/2023 1002)  Absence of Physical Injury: Implement safety measures based on patient assessment     Problem: Chronic Conditions and Co-morbidities  Goal: Patient's chronic conditions and co-morbidity symptoms are monitored and maintained or improved  Outcome: Progressing     Problem: Safety - Adult  Goal: Free from fall injury  Outcome: Progressing     Problem: Discharge Planning  Goal: Discharge to home or other facility with appropriate resources  Outcome: Not Progressing

## 2023-08-25 NOTE — PROGRESS NOTES
Clinical Pharmacy Consult Note    Vancomycin - Management by Pharmacy    Consult Date(s): 8/24/23  Consulting Provider(s): Dr. Mora Charter / Plan  1)  R axillary wound/cellulitis - Vancomycin  Concurrent Antimicrobials: Ceftriaxone   Day of Vanc Therapy / Ordered Duration: Day 2 of 5  Current Dosing Method: Bayesian-Guided AUC Dosing  Therapeutic Goal: -600 mg/L*hr  Current Dose / Plan:   Currently on 1250mg IV q12h. SCr improved this AM 1.2-->0.9. UOP not documented. Will increase dose to 1500mg IV q12h. Regimen estimates AUC = 498 with trough = 12.4 mcg/mL. Random level is ordered for tomorrow AM.  Will continue to monitor clinical condition and make adjustments to regimen as appropriate    Please call with questions--  Thanks--  Marcy Layton, PharmD, BCPS, BCGP  [id number] ([facility])   8/25/2023 11:07 AM      Interval update:  Afebrile; erythema receding per nursing notes. Wound & Blood cultures in process. Subjective/Objective:   Duncan Badillo is a 53 y.o. male with a PMHx significant for T2DM, CAD, HTN, HFpEF, anxiety, depression, and CHARLIE who is admitted with R axillary wound / cellulitis. Pharmacy is consulted to dose vancomycin. Ht Readings from Last 1 Encounters:   08/24/23 5' 7\" (1.702 m)     Wt Readings from Last 1 Encounters:   08/24/23 (!) 417 lb 5.3 oz (189.3 kg)     Current & Prior Antimicrobial Regimen(s):  Ceftriaxone (8/24-current)  Vancomycin - Pharmacy to dose  2500mg IV x1 8/24 13:45  1250mg IV q12h (8/25 x1 dose)  1500mg IV q12h (8/25-current)    Vancomycin Level(s) / Doses:  Date Time Dose Type of Level / Level Interpretation   8/26 08:00 1500mg q12h Random = ordered           Note: Serum levels collected for AUC-based dosing may be high if collected in close proximity to the dose administered. This is not necessarily indicative of toxicity.     Cultures & Sensitivities:  Date Site Micro Susceptibility / Result   8/24 Wound culture Sent    8/24 Blood cultures x2 Sent      Recent Labs     08/24/23  1136 08/25/23  0708   CREATININE 1.2 0.9   BUN 22* 15   WBC 10.3 8.4     Estimated Creatinine Clearance: 166 mL/min (based on SCr of 0.9 mg/dL).     Additional Lab Values / Findings of Note:  Recent Labs     08/24/23  1136   PROCAL 0.25*

## 2023-08-25 NOTE — PROGRESS NOTES
VSS exec for elevated BP for which scheduled Metoprolol was administered per order. Pt currently denies having pain. Pt a/o x4. Dressing to R axilla, CDI with scant amount of purulent, serosanguinous drainage. Surrounding erythema has receded. POC discussed with pt, questions/concerns addressed at this time.

## 2023-08-25 NOTE — CARE COORDINATION
1:01 PM  Upon review of patients chart; pt is from home, IPTA, no current home services. Pt will not need transport assistance at time of dc. No CM/SW needs anticipated. CM will sign off at this time. Please consult CM/SW if any dcp needs arise. Patient seen by [facility], unable to qualify for medicaid. Patient will be lori and need financial assistance will inpatient. MD team made aware patient will not be able to be sent home on IV atbx due to financial aid assistance.      Readmission Risk Score: 10.7       Electronically signed by Kathy Ramires RN, CM on 8/25/2023 at 1:05 PM.  Phone: [phone]  Fax: [phone]

## 2023-08-25 NOTE — PROGRESS NOTES
Pt alert and oriented. VSS. Pt NSR on tele. Pt tolerating diet without issue. Pt denies pain. Pt dressing to right axilla CDI, no further swelling or redness passed outline. Pt receiving intermittent ABX, see MAR. Pt has call light within reach, bed in lowest position with wheels locked, 2/4 side rails up, and pt is up ad jacqui.

## 2023-08-26 VITALS
SYSTOLIC BLOOD PRESSURE: 174 MMHG | OXYGEN SATURATION: 96 % | DIASTOLIC BLOOD PRESSURE: 88 MMHG | RESPIRATION RATE: 19 BRPM | HEART RATE: 75 BPM | WEIGHT: 315 LBS | TEMPERATURE: 97.8 F | HEIGHT: 67 IN | BODY MASS INDEX: 49.44 KG/M2

## 2023-08-26 LAB
ANION GAP SERPL CALCULATED.3IONS-SCNC: 10 MMOL/L (ref 3–16)
BACTERIA SPEC AEROBE CULT: ABNORMAL
BACTERIA SPEC AEROBE CULT: ABNORMAL
BASOPHILS # BLD: 0 K/UL (ref 0–0.2)
BASOPHILS NFR BLD: 0 %
BUN SERPL-MCNC: 13 MG/DL (ref 7–20)
CALCIUM SERPL-MCNC: 9.3 MG/DL (ref 8.3–10.6)
CHLORIDE SERPL-SCNC: 101 MMOL/L (ref 99–110)
CO2 SERPL-SCNC: 26 MMOL/L (ref 21–32)
CREAT SERPL-MCNC: 0.9 MG/DL (ref 0.9–1.3)
DEPRECATED RDW RBC AUTO: 16.9 % (ref 12.4–15.4)
EOSINOPHIL # BLD: 0.1 K/UL (ref 0–0.6)
EOSINOPHIL NFR BLD: 1 %
GFR SERPLBLD CREATININE-BSD FMLA CKD-EPI: >60 ML/MIN/{1.73_M2}
GLUCOSE BLD-MCNC: 159 MG/DL (ref 70–99)
GLUCOSE BLD-MCNC: 183 MG/DL (ref 70–99)
GLUCOSE SERPL-MCNC: 164 MG/DL (ref 70–99)
HCT VFR BLD AUTO: 35.9 % (ref 40.5–52.5)
HGB BLD-MCNC: 12 G/DL (ref 13.5–17.5)
LACTATE BLDV-SCNC: 1.7 MMOL/L (ref 0.4–2)
LYMPHOCYTES # BLD: 1.6 K/UL (ref 1–5.1)
LYMPHOCYTES NFR BLD: 20 %
MCH RBC QN AUTO: 28.5 PG (ref 26–34)
MCHC RBC AUTO-ENTMCNC: 33.6 G/DL (ref 31–36)
MCV RBC AUTO: 84.9 FL (ref 80–100)
METAMYELOCYTES NFR BLD MANUAL: 1 %
MONOCYTES # BLD: 0.4 K/UL (ref 0–1.3)
MONOCYTES NFR BLD: 5 %
MYELOCYTES NFR BLD MANUAL: 1 %
NEUTROPHILS # BLD: 5.9 K/UL (ref 1.7–7.7)
NEUTROPHILS NFR BLD: 72 %
ORGANISM: ABNORMAL
PERFORMED ON: ABNORMAL
PERFORMED ON: ABNORMAL
PLATELET # BLD AUTO: 220 K/UL (ref 135–450)
PMV BLD AUTO: 7.4 FL (ref 5–10.5)
POTASSIUM SERPL-SCNC: 3.9 MMOL/L (ref 3.5–5.1)
RBC # BLD AUTO: 4.23 M/UL (ref 4.2–5.9)
SODIUM SERPL-SCNC: 137 MMOL/L (ref 136–145)
VANCOMYCIN SERPL-MCNC: 20.4 UG/ML
WBC # BLD AUTO: 8 K/UL (ref 4–11)

## 2023-08-26 PROCEDURE — 80048 BASIC METABOLIC PNL TOTAL CA: CPT

## 2023-08-26 PROCEDURE — 80202 ASSAY OF VANCOMYCIN: CPT

## 2023-08-26 PROCEDURE — 2580000003 HC RX 258: Performed by: STUDENT IN AN ORGANIZED HEALTH CARE EDUCATION/TRAINING PROGRAM

## 2023-08-26 PROCEDURE — 6360000002 HC RX W HCPCS: Performed by: STUDENT IN AN ORGANIZED HEALTH CARE EDUCATION/TRAINING PROGRAM

## 2023-08-26 PROCEDURE — 36415 COLL VENOUS BLD VENIPUNCTURE: CPT

## 2023-08-26 PROCEDURE — 6370000000 HC RX 637 (ALT 250 FOR IP): Performed by: STUDENT IN AN ORGANIZED HEALTH CARE EDUCATION/TRAINING PROGRAM

## 2023-08-26 PROCEDURE — 6370000000 HC RX 637 (ALT 250 FOR IP): Performed by: NURSE PRACTITIONER

## 2023-08-26 PROCEDURE — 85025 COMPLETE CBC W/AUTO DIFF WBC: CPT

## 2023-08-26 PROCEDURE — 94660 CPAP INITIATION&MGMT: CPT

## 2023-08-26 PROCEDURE — 83605 ASSAY OF LACTIC ACID: CPT

## 2023-08-26 RX ORDER — CEPHALEXIN 500 MG/1
500 CAPSULE ORAL 4 TIMES DAILY
Qty: 28 CAPSULE | Refills: 0 | Status: SHIPPED | OUTPATIENT
Start: 2023-08-26 | End: 2023-09-02

## 2023-08-26 RX ORDER — SPIRONOLACTONE 25 MG/1
25 TABLET ORAL DAILY
Status: DISCONTINUED | OUTPATIENT
Start: 2023-08-26 | End: 2023-08-26 | Stop reason: HOSPADM

## 2023-08-26 RX ORDER — FUROSEMIDE 40 MG/1
40 TABLET ORAL DAILY
Status: DISCONTINUED | OUTPATIENT
Start: 2023-08-26 | End: 2023-08-26 | Stop reason: HOSPADM

## 2023-08-26 RX ORDER — SULFAMETHOXAZOLE AND TRIMETHOPRIM 800; 160 MG/1; MG/1
1 TABLET ORAL 2 TIMES DAILY
Qty: 14 TABLET | Refills: 0 | Status: SHIPPED | OUTPATIENT
Start: 2023-08-26 | End: 2023-09-02

## 2023-08-26 RX ADMIN — INSULIN HUMAN 10 UNITS: 100 INJECTION, SOLUTION PARENTERAL at 08:52

## 2023-08-26 RX ADMIN — OXYCODONE HYDROCHLORIDE AND ACETAMINOPHEN 250 MG: 500 TABLET ORAL at 08:52

## 2023-08-26 RX ADMIN — CLOPIDOGREL BISULFATE 75 MG: 75 TABLET ORAL at 08:52

## 2023-08-26 RX ADMIN — CEFTRIAXONE 1000 MG: 1 INJECTION, POWDER, FOR SOLUTION INTRAMUSCULAR; INTRAVENOUS at 12:14

## 2023-08-26 RX ADMIN — METOPROLOL TARTRATE 50 MG: 25 TABLET, FILM COATED ORAL at 08:52

## 2023-08-26 RX ADMIN — LOSARTAN POTASSIUM 50 MG: 50 TABLET, FILM COATED ORAL at 08:52

## 2023-08-26 RX ADMIN — FUROSEMIDE 40 MG: 40 TABLET ORAL at 08:52

## 2023-08-26 RX ADMIN — SPIRONOLACTONE 25 MG: 25 TABLET, FILM COATED ORAL at 08:52

## 2023-08-26 RX ADMIN — BUPROPION HYDROCHLORIDE 150 MG: 150 TABLET, EXTENDED RELEASE ORAL at 08:52

## 2023-08-26 RX ADMIN — SERTRALINE HYDROCHLORIDE 50 MG: 50 TABLET ORAL at 08:52

## 2023-08-26 RX ADMIN — VANCOMYCIN HYDROCHLORIDE 1500 MG: 10 INJECTION, POWDER, LYOPHILIZED, FOR SOLUTION INTRAVENOUS at 01:10

## 2023-08-26 RX ADMIN — ASPIRIN 81 MG: 81 TABLET, COATED ORAL at 08:52

## 2023-08-26 RX ADMIN — GABAPENTIN 600 MG: 300 CAPSULE ORAL at 08:52

## 2023-08-26 NOTE — PROGRESS NOTES
Clinical Pharmacy Consult Note    Vancomycin - Management by Pharmacy    Consult Date(s): 8/24/23  Consulting Provider(s): Dr. Placido Lim / Plan  SSTI (R axillary wound/cellulitis) - Vancomycin  Concurrent Antimicrobials: Ceftriaxone 1 g IV q24h  Day of Vanc Therapy / Ordered Duration: Day #3 of 5  Current Dosing Method: Bayesian-Guided AUC Dosing  Therapeutic Goal: -600 mg/L*hr  Current Dose / Plan:   Pt's SCr remains at/near baseline today at 0.9 mg/dL per chart review; Pt currently on maintenance regimen of vancomycin 1500 mg IV q12h at this time  Selected regimen predicts AUC of 498 mg/L*hr with steady state trough of 12.4 mg/L   Random level obtained today of 20.4 mg/L drawn after only 2 doses of new regimen (was previously on 1250 mg IV q12h) -> patient likely not yet at steady state with new dose  Therefore, will plan continue current dose at this time and to obtain repeat random level tomorrow morning for more accurate assessment when patient is closer to steady state concentration (8/27, ordered) with new regimen  Will continue to monitor clinical condition and make adjustments to regimen as appropriate    Thank you for consulting pharmacy,    Omari Armendariz, PharmD, [address] [place] [address] Pharmacist - Emergency Dept  Wireless: [phone]  8/26/2023 11:22 AM      Interval update:  Patient is persistently hypertensive, but is otherwise stable, afebrile, on room air at this time. No leukocytosis noted on today's CBC. Wound culture now positive for light growth of Proteus mirabilis (pan-susceptible). Blood cultures remain no growth to date. Subjective/Objective: Hammad Webster is a 53 y.o. male with a PMHx significant for T2DM, CAD, HTN, HFpEF, anxiety, depression, and CHARLIE who is admitted with R axillary wound. Pharmacy is consulted to dose vancomycin.     Ht Readings from Last 1 Encounters:   08/24/23 5' 7\" (1.702 m)     Wt Readings from Last 1 Encounters:   08/26/23 (!) 403 lb 3.5 oz (182.9 kg)     Current & Prior Antimicrobial Regimen(s):  Vancomycin IV PTD (8/24-current)  Ceftriaxone 1 g IV q24h (8/24-current)    Vancomycin Level(s) / Doses:  Date Time Dose Type of Level / Level Interpretation   8/26 0600 1500 mg IV q12h Random = 20.4 mg/L Drawn after only 2 doses of new regimen, rpt level in AM   8/27 0600 1500 mg IV q12h     Note: Serum levels collected for AUC-based dosing may be high if collected in close proximity to the dose administered. This is not necessarily indicative of toxicity. Cultures & Sensitivities:  Date Site Micro Susceptibility / Result   8/24 Wound culture Proteus mirabilis  Pan-sensitive   8/24 Blood cultures x2 NGTD Preliminary     Recent Labs     08/24/23  1136 08/25/23  0708 08/26/23  0729 08/26/23  0730   CREATININE 1.2 0.9 0.9  --    BUN 22* 15 13  --    WBC 10.3 8.4  --  8.0       Estimated Creatinine Clearance: 162 mL/min (based on SCr of 0.9 mg/dL).     Additional Lab Values / Findings of Note:  Recent Labs     08/24/23  1136   PROCAL 0.25*

## 2023-08-28 ENCOUNTER — CARE COORDINATION (OUTPATIENT)
Dept: CARE COORDINATION | Age: 48
End: 2023-08-28

## 2023-08-28 LAB
BACTERIA BLD CULT ORG #2: NORMAL
BACTERIA BLD CULT: NORMAL

## 2023-08-28 NOTE — CARE COORDINATION
Care Transitions Outreach Attempt    Call within 2 business days of discharge: Yes   Attempted to reach patient for transitions of care follow up. Unable to reach patient. Patient: Kathy Zavaleta Patient : 1975 MRN: [id number]    Last Discharge [address]       Date Complaint Diagnosis Description Type Department Provider    23 Wound Check Severe sepsis ([address]) . .. ED to Hosp-Admission (Discharged) (ADMITTED) [facility] Abdelrahman York MD; Dicie Rinne... Was this an external facility discharge?  No Discharge Facility: NA    Noted following upcoming appointments from discharge chart review:   [facility] follow up appointment(s):   Future Appointments   Date Time Provider [address]   10/13/2023  3:45 PM [facility]     [facility] follow up appointment(s): NA

## 2023-08-30 ENCOUNTER — CARE COORDINATION (OUTPATIENT)
Dept: CASE MANAGEMENT | Age: 48
End: 2023-08-30

## 2023-08-30 NOTE — CARE COORDINATION
[facility] Transitions Initial Follow Up Call    Call within 2 business days of discharge: Yes    Patient:  Diana Quintero  Patient :  1975  MRN:  [id number]    Reason for Admission:  cellulitis / right axilla   Discharge Date:  23   RARS: 10      Transitions of Care Initial Call    Was this an external facility discharge? no    Discharge Facility: [facility]      Challenges to be reviewed by the provider   Additional needs identified to be addressed with provider:   yes - hfu needed            2nd CTC attempt to reach Pt regarding recent hospital discharge. CTC unable to leave voice recording with call back number requesting a call back / no answer. CT closed / unable to reach. HFU with PCP needed / msg routed. Follow up appointments:    Future Appointments   Date Time Provider 4600  46[facility]   10/13/2023  3:45 PM Jayjay Mc MD [facility]       Odalys Buchanan.  MICHAELA AdamsN, RN  Care Transition Coordinator  Contact Number:  [phone]

## 2023-10-21 DIAGNOSIS — E11.9 TYPE 2 DIABETES MELLITUS WITHOUT COMPLICATION, WITHOUT LONG-TERM CURRENT USE OF INSULIN (HCC): ICD-10-CM

## 2023-10-23 RX ORDER — ATORVASTATIN CALCIUM 40 MG/1
40 TABLET, FILM COATED ORAL NIGHTLY
Qty: 90 TABLET | Refills: 0 | Status: SHIPPED | OUTPATIENT
Start: 2023-10-23

## 2023-11-17 DIAGNOSIS — G62.9 NEUROPATHY: ICD-10-CM

## 2023-11-17 NOTE — TELEPHONE ENCOUNTER
Medication:   Requested Prescriptions     Pending Prescriptions Disp Refills    gabapentin (NEURONTIN) 300 MG capsule [Pharmacy Med Name: GABAPENTIN 300MG CAPSULES] 120 capsule 2     Sig: TAKE 2 CAPSULES BY MOUTH TWICE DAILY        Last Filled:      Patient Phone Number: [phone] (home)     Last appt: 4/5/2023   Next appt: Visit date not found    Last OARRS:       5/3/2019     2:30 PM   RX Monitoring   Attestation The Prescription Monitoring Report for this patient was reviewed today.   Periodic Controlled Substance Monitoring Possible medication side effects, risk of tolerance/dependence & alternative treatments discussed.;No signs of potential drug abuse or diversion identified: otherwise, see note documentation

## 2023-11-19 RX ORDER — GABAPENTIN 300 MG/1
CAPSULE ORAL
Qty: 120 CAPSULE | Refills: 2 | OUTPATIENT
Start: 2023-11-19 | End: 2024-02-17

## 2024-01-11 ENCOUNTER — COMMUNITY OUTREACH (OUTPATIENT)
Dept: FAMILY MEDICINE CLINIC | Age: 49
End: 2024-01-11

## 2024-02-04 DIAGNOSIS — E11.9 TYPE 2 DIABETES MELLITUS WITHOUT COMPLICATION, WITHOUT LONG-TERM CURRENT USE OF INSULIN (HCC): ICD-10-CM

## 2024-02-05 DIAGNOSIS — E11.9 TYPE 2 DIABETES MELLITUS WITHOUT COMPLICATION, WITHOUT LONG-TERM CURRENT USE OF INSULIN (HCC): ICD-10-CM

## 2024-02-05 RX ORDER — ATORVASTATIN CALCIUM 40 MG/1
40 TABLET, FILM COATED ORAL NIGHTLY
Qty: 30 TABLET | Refills: 0 | Status: SHIPPED | OUTPATIENT
Start: 2024-02-05

## 2024-02-06 RX ORDER — ATORVASTATIN CALCIUM 40 MG/1
40 TABLET, FILM COATED ORAL NIGHTLY
Qty: 90 TABLET | Refills: 0 | OUTPATIENT
Start: 2024-02-06

## 2024-03-14 ENCOUNTER — OFFICE VISIT (OUTPATIENT)
Dept: FAMILY MEDICINE CLINIC | Age: 49
End: 2024-03-14

## 2024-03-14 VITALS
SYSTOLIC BLOOD PRESSURE: 138 MMHG | DIASTOLIC BLOOD PRESSURE: 88 MMHG | BODY MASS INDEX: 49.44 KG/M2 | HEIGHT: 67 IN | WEIGHT: 315 LBS | HEART RATE: 87 BPM | OXYGEN SATURATION: 93 %

## 2024-03-14 DIAGNOSIS — I10 PRIMARY HYPERTENSION: Chronic | ICD-10-CM

## 2024-03-14 DIAGNOSIS — I87.2 VENOUS ULCER OF LEFT LOWER EXTREMITY WITHOUT VARICOSE VEINS (HCC): ICD-10-CM

## 2024-03-14 DIAGNOSIS — E78.5 HYPERLIPIDEMIA, UNSPECIFIED HYPERLIPIDEMIA TYPE: Chronic | ICD-10-CM

## 2024-03-14 DIAGNOSIS — I50.32 CHRONIC HEART FAILURE WITH PRESERVED EJECTION FRACTION (HCC): ICD-10-CM

## 2024-03-14 DIAGNOSIS — M1A.9XX0 CHRONIC GOUT INVOLVING TOE WITHOUT TOPHUS, UNSPECIFIED CAUSE, UNSPECIFIED LATERALITY: ICD-10-CM

## 2024-03-14 DIAGNOSIS — F41.8 DEPRESSION WITH ANXIETY: ICD-10-CM

## 2024-03-14 DIAGNOSIS — G89.29 CHRONIC ABDOMINAL PAIN: ICD-10-CM

## 2024-03-14 DIAGNOSIS — I25.118 CORONARY ARTERY DISEASE OF NATIVE HEART WITH STABLE ANGINA PECTORIS, UNSPECIFIED VESSEL OR LESION TYPE (HCC): ICD-10-CM

## 2024-03-14 DIAGNOSIS — L97.929 VENOUS ULCER OF LEFT LOWER EXTREMITY WITHOUT VARICOSE VEINS (HCC): ICD-10-CM

## 2024-03-14 DIAGNOSIS — R10.9 CHRONIC ABDOMINAL PAIN: ICD-10-CM

## 2024-03-14 DIAGNOSIS — E11.40 TYPE 2 DIABETES MELLITUS WITH DIABETIC NEUROPATHY, WITHOUT LONG-TERM CURRENT USE OF INSULIN (HCC): Primary | ICD-10-CM

## 2024-03-14 DIAGNOSIS — E55.9 VITAMIN D INSUFFICIENCY: ICD-10-CM

## 2024-03-14 PROCEDURE — 3079F DIAST BP 80-89 MM HG: CPT | Performed by: FAMILY MEDICINE

## 2024-03-14 PROCEDURE — 99215 OFFICE O/P EST HI 40 MIN: CPT | Performed by: FAMILY MEDICINE

## 2024-03-14 PROCEDURE — 3075F SYST BP GE 130 - 139MM HG: CPT | Performed by: FAMILY MEDICINE

## 2024-03-14 RX ORDER — GABAPENTIN 300 MG/1
600 CAPSULE ORAL 2 TIMES DAILY
Qty: 120 CAPSULE | Refills: 1 | Status: SHIPPED | OUTPATIENT
Start: 2024-03-14 | End: 2024-09-10

## 2024-03-14 RX ORDER — AMITRIPTYLINE HYDROCHLORIDE 25 MG/1
25 TABLET, FILM COATED ORAL NIGHTLY
Qty: 90 TABLET | Refills: 0 | Status: SHIPPED | OUTPATIENT
Start: 2024-03-14

## 2024-03-14 RX ORDER — PETROLATUM 42 G/100G
OINTMENT TOPICAL
Qty: 228 G | Refills: 2 | Status: SHIPPED | OUTPATIENT
Start: 2024-03-14

## 2024-03-14 RX ORDER — ATORVASTATIN CALCIUM 40 MG/1
40 TABLET, FILM COATED ORAL NIGHTLY
Qty: 90 TABLET | Refills: 1 | Status: SHIPPED | OUTPATIENT
Start: 2024-03-14

## 2024-03-14 RX ORDER — MULTIVIT-MIN/IRON/FOLIC ACID/K 18-600-40
CAPSULE ORAL
Qty: 90 CAPSULE | Refills: 1 | Status: SHIPPED | OUTPATIENT
Start: 2024-03-14

## 2024-03-14 RX ORDER — BUPROPION HYDROCHLORIDE 150 MG/1
TABLET, FILM COATED, EXTENDED RELEASE ORAL
Qty: 180 TABLET | Refills: 3 | Status: SHIPPED | OUTPATIENT
Start: 2024-03-14

## 2024-03-14 RX ORDER — LOSARTAN POTASSIUM 50 MG/1
50 TABLET ORAL DAILY
Qty: 90 TABLET | Refills: 3 | Status: SHIPPED | OUTPATIENT
Start: 2024-03-14

## 2024-03-14 RX ORDER — ALLOPURINOL 100 MG/1
100 TABLET ORAL DAILY
Qty: 90 TABLET | Refills: 1 | Status: SHIPPED | OUTPATIENT
Start: 2024-03-14

## 2024-03-14 RX ORDER — METOPROLOL TARTRATE 50 MG/1
50 TABLET, FILM COATED ORAL 2 TIMES DAILY
Qty: 180 TABLET | Refills: 3 | Status: SHIPPED | OUTPATIENT
Start: 2024-03-14

## 2024-03-14 SDOH — ECONOMIC STABILITY: FOOD INSECURITY: WITHIN THE PAST 12 MONTHS, THE FOOD YOU BOUGHT JUST DIDN'T LAST AND YOU DIDN'T HAVE MONEY TO GET MORE.: NEVER TRUE

## 2024-03-14 SDOH — ECONOMIC STABILITY: FOOD INSECURITY: WITHIN THE PAST 12 MONTHS, YOU WORRIED THAT YOUR FOOD WOULD RUN OUT BEFORE YOU GOT MONEY TO BUY MORE.: NEVER TRUE

## 2024-03-14 SDOH — ECONOMIC STABILITY: INCOME INSECURITY: HOW HARD IS IT FOR YOU TO PAY FOR THE VERY BASICS LIKE FOOD, HOUSING, MEDICAL CARE, AND HEATING?: NOT HARD AT ALL

## 2024-03-14 ASSESSMENT — PATIENT HEALTH QUESTIONNAIRE - PHQ9
SUM OF ALL RESPONSES TO PHQ QUESTIONS 1-9: 0
1. LITTLE INTEREST OR PLEASURE IN DOING THINGS: 0
8. MOVING OR SPEAKING SO SLOWLY THAT OTHER PEOPLE COULD HAVE NOTICED. OR THE OPPOSITE - BEING SO FIDGETY OR RESTLESS THAT YOU HAVE BEEN MOVING AROUND A LOT MORE THAN USUAL: NOT AT ALL
6. FEELING BAD ABOUT YOURSELF - OR THAT YOU ARE A FAILURE OR HAVE LET YOURSELF OR YOUR FAMILY DOWN: NOT AT ALL
2. FEELING DOWN, DEPRESSED OR HOPELESS: 0
SUM OF ALL RESPONSES TO PHQ9 QUESTIONS 1 & 2: 0
10. IF YOU CHECKED OFF ANY PROBLEMS, HOW DIFFICULT HAVE THESE PROBLEMS MADE IT FOR YOU TO DO YOUR WORK, TAKE CARE OF THINGS AT HOME, OR GET ALONG WITH OTHER PEOPLE: 0
4. FEELING TIRED OR HAVING LITTLE ENERGY: 0
2. FEELING DOWN, DEPRESSED OR HOPELESS: NOT AT ALL
9. THOUGHTS THAT YOU WOULD BE BETTER OFF DEAD, OR OF HURTING YOURSELF: NOT AT ALL
4. FEELING TIRED OR HAVING LITTLE ENERGY: NOT AT ALL
5. POOR APPETITE OR OVEREATING: 0
5. POOR APPETITE OR OVEREATING: NOT AT ALL
10. IF YOU CHECKED OFF ANY PROBLEMS, HOW DIFFICULT HAVE THESE PROBLEMS MADE IT FOR YOU TO DO YOUR WORK, TAKE CARE OF THINGS AT HOME, OR GET ALONG WITH OTHER PEOPLE: NOT DIFFICULT AT ALL
6. FEELING BAD ABOUT YOURSELF - OR THAT YOU ARE A FAILURE OR HAVE LET YOURSELF OR YOUR FAMILY DOWN: 0
7. TROUBLE CONCENTRATING ON THINGS, SUCH AS READING THE NEWSPAPER OR WATCHING TELEVISION: NOT AT ALL
8. MOVING OR SPEAKING SO SLOWLY THAT OTHER PEOPLE COULD HAVE NOTICED. OR THE OPPOSITE, BEING SO FIGETY OR RESTLESS THAT YOU HAVE BEEN MOVING AROUND A LOT MORE THAN USUAL: 0
3. TROUBLE FALLING OR STAYING ASLEEP: NOT AT ALL
SUM OF ALL RESPONSES TO PHQ QUESTIONS 1-9: 0
3. TROUBLE FALLING OR STAYING ASLEEP: 0
1. LITTLE INTEREST OR PLEASURE IN DOING THINGS: NOT AT ALL
9. THOUGHTS THAT YOU WOULD BE BETTER OFF DEAD, OR OF HURTING YOURSELF: 0
7. TROUBLE CONCENTRATING ON THINGS, SUCH AS READING THE NEWSPAPER OR WATCHING TELEVISION: 0

## 2024-03-14 NOTE — PROGRESS NOTES
family/caregiver   Ordering medications, tests or procedures  Referring to other health care professional   Documenting clinical information in this electronic record   Independently interpreting results and communications results to the patient / family or caregiver  Care coordination           Marsha Uribe MD

## 2024-03-16 LAB
ALBUMIN SERPL-MCNC: 4 G/DL (ref 3.4–5)
ALBUMIN/GLOB SERPL: 1.4 {RATIO} (ref 1.1–2.2)
ALP SERPL-CCNC: 218 U/L (ref 40–129)
ALT SERPL-CCNC: 54 U/L (ref 10–40)
ANION GAP SERPL CALCULATED.3IONS-SCNC: 13 MMOL/L (ref 3–16)
AST SERPL-CCNC: 34 U/L (ref 15–37)
BILIRUB SERPL-MCNC: 0.4 MG/DL (ref 0–1)
BUN SERPL-MCNC: 16 MG/DL (ref 7–20)
CALCIUM SERPL-MCNC: 9.8 MG/DL (ref 8.3–10.6)
CHLORIDE SERPL-SCNC: 96 MMOL/L (ref 99–110)
CHOLEST SERPL-MCNC: 151 MG/DL (ref 0–199)
CO2 SERPL-SCNC: 26 MMOL/L (ref 21–32)
CREAT SERPL-MCNC: 1 MG/DL (ref 0.9–1.3)
GFR SERPLBLD CREATININE-BSD FMLA CKD-EPI: >60 ML/MIN/{1.73_M2}
GLUCOSE SERPL-MCNC: 431 MG/DL (ref 70–99)
HDLC SERPL-MCNC: 37 MG/DL (ref 40–60)
LDLC SERPL CALC-MCNC: 76 MG/DL
POTASSIUM SERPL-SCNC: 4.7 MMOL/L (ref 3.5–5.1)
PROT SERPL-MCNC: 6.9 G/DL (ref 6.4–8.2)
SODIUM SERPL-SCNC: 135 MMOL/L (ref 136–145)
TRIGL SERPL-MCNC: 192 MG/DL (ref 0–150)
VLDLC SERPL CALC-MCNC: 38 MG/DL

## 2024-03-17 LAB
EST. AVERAGE GLUCOSE BLD GHB EST-MCNC: 346.5 MG/DL
HBA1C MFR BLD: 13.7 %

## 2024-03-26 ENCOUNTER — TELEPHONE (OUTPATIENT)
Dept: PRIMARY CARE CLINIC | Age: 49
End: 2024-03-26

## 2024-03-26 ENCOUNTER — TELEMEDICINE (OUTPATIENT)
Dept: FAMILY MEDICINE CLINIC | Age: 49
End: 2024-03-26

## 2024-03-26 DIAGNOSIS — E11.649 UNCONTROLLED TYPE 2 DIABETES MELLITUS WITH HYPOGLYCEMIA WITHOUT COMA (HCC): Primary | ICD-10-CM

## 2024-03-26 PROCEDURE — 99214 OFFICE O/P EST MOD 30 MIN: CPT | Performed by: FAMILY MEDICINE

## 2024-03-26 PROCEDURE — 3046F HEMOGLOBIN A1C LEVEL >9.0%: CPT | Performed by: FAMILY MEDICINE

## 2024-03-26 RX ORDER — SYRINGE-NEEDLE,INSULIN,0.5 ML 31 GX5/16"
1 SYRINGE, EMPTY DISPOSABLE MISCELLANEOUS DAILY
Qty: 100 EACH | Refills: 3 | Status: SHIPPED | OUTPATIENT
Start: 2024-03-26

## 2024-03-26 RX ORDER — LANCETS 30 GAUGE
EACH MISCELLANEOUS
Qty: 100 EACH | Refills: 3 | Status: SHIPPED | OUTPATIENT
Start: 2024-03-26

## 2024-03-26 RX ORDER — GLUCOSAMINE HCL/CHONDROITIN SU 500-400 MG
CAPSULE ORAL
Qty: 100 STRIP | Refills: 1 | Status: SHIPPED | OUTPATIENT
Start: 2024-03-26

## 2024-03-26 RX ORDER — BLOOD-GLUCOSE METER
1 KIT MISCELLANEOUS DAILY
Qty: 1 KIT | Refills: 0 | Status: SHIPPED | OUTPATIENT
Start: 2024-03-26

## 2024-03-26 NOTE — PROGRESS NOTES
Subjective:      Patient ID: Michael Zavaleta is a 48 y.o. male.      Michael Zavaleta, was evaluated through a synchronous (real-time) audio-video encounter. The patient (or guardian if applicable) is aware that this is a billable service, which includes applicable co-pays. This Virtual Visit was conducted with patient's (and/or legal guardian's) consent. Patient identification was verified, and a caregiver was present when appropriate.   The patient was located at Home: [address] [place] [address]  [address]  [place] [address]  Provider was located at Facility (Appt Dept): [address] [place] [address], [address]  [place] [address]       Total time spent for this encounter: Not billed by time    --Marsha Uribe MD on 3/26/2024 at 2:54 PM    An electronic signature was used to authenticate this note.    Diabetes  He presents for his follow-up diabetic visit. He has type 2 diabetes mellitus. His disease course has been worsening. Associated symptoms include fatigue and foot paresthesias. Symptoms are stable. Current diabetic treatments: just re started metformin. He is compliant with treatment none of the time. His weight is stable. He is following a generally unhealthy diet. He never participates in exercise.       Review of Systems   Constitutional:  Positive for fatigue.       Objective:   Physical Exam  Vitals reviewed.   Constitutional:       General: He is not in acute distress.     Appearance: Normal appearance. He is not ill-appearing, toxic-appearing or diaphoretic.   HENT:      Head: Normocephalic and atraumatic.   Pulmonary:      Effort: No respiratory distress.   Musculoskeletal:      Cervical back: Normal range of motion.   Neurological:      General: No focal deficit present.      Mental Status: He is alert and oriented to person, place, and time. Mental status is at baseline.   Psychiatric:         Mood and Affect: Mood normal.         Behavior: Behavior normal.         Assessment:       Diagnosis Orders   1.

## 2024-03-26 NOTE — TELEPHONE ENCOUNTER
----- Message from Marsha Uribe MD sent at 3/21/2024  7:29 AM EDT -----  Phone visit apt tomorrow at 2   His sugar is 431!   Needs to start insulin       A1c 13.7!

## 2024-04-11 RX ORDER — SPIRONOLACTONE 25 MG/1
25 TABLET ORAL DAILY
Qty: 90 TABLET | Refills: 3 | OUTPATIENT
Start: 2024-04-11

## 2024-04-11 RX ORDER — FUROSEMIDE 20 MG/1
20 TABLET ORAL DAILY
Qty: 90 TABLET | Refills: 0 | Status: SHIPPED | OUTPATIENT
Start: 2024-04-11

## 2024-04-11 NOTE — TELEPHONE ENCOUNTER
[facility] Medication Refills:    Medication  furosemide (LASIX) 40 MG tablet [3295]  furosemide (LASIX) 40 MG tablet [[id number]]    Order Details  Dose: 40 mg Route: Oral Frequency: DAILY   Dispense Quantity: 90 tablet Refills: 3          Sig: Take 1 tablet by mouth daily       spironolactone (ALDACTONE) 25 MG tablet [[id number]]    Order Details  Dose: 25 mg Route: Oral Frequency: DAILY   Dispense Quantity: 90 tablet Refills: 3          Sig: Take 1 tablet by mouth daily       Pharmacy    [id number] [place], [place] - [address] - P [phone] - F [phone]  [address] [facility] [address]  Phone: [phone]  Fax: [phone]       Last Office Visit: 04/14/23    Next Office Visit: 05/15/24    Last Refill: 04/14/23    Last Labs: 03/16/24

## 2024-04-12 RX ORDER — FUROSEMIDE 40 MG/1
40 TABLET ORAL DAILY
Qty: 90 TABLET | Refills: 3 | Status: SHIPPED | OUTPATIENT
Start: 2024-04-12

## 2024-04-12 RX ORDER — SPIRONOLACTONE 25 MG/1
25 TABLET ORAL DAILY
Qty: 90 TABLET | Refills: 3 | Status: SHIPPED | OUTPATIENT
Start: 2024-04-12

## 2024-04-15 ENCOUNTER — TELEPHONE (OUTPATIENT)
Dept: FAMILY MEDICINE CLINIC | Age: 49
End: 2024-04-15

## 2024-04-15 NOTE — TELEPHONE ENCOUNTER
Pt wondering now that he's on insulin should he still take Metformin 500mg  Pt states he has not been taking it for a bout a week or 2  Please advise pt

## 2024-04-16 NOTE — TELEPHONE ENCOUNTER
Spoke with pt and gave him the information regarding his Metformin. He said his sugar was 288 yesterday after not eating or drinking and later in the day it went down to 256 and then 156. He states that he has not taken the metformin but he will start the medication. He does have a FU appointment scheduled

## 2024-04-18 ENCOUNTER — OFFICE VISIT (OUTPATIENT)
Dept: FAMILY MEDICINE CLINIC | Age: 49
End: 2024-04-18

## 2024-04-18 VITALS
SYSTOLIC BLOOD PRESSURE: 116 MMHG | HEIGHT: 67 IN | RESPIRATION RATE: 16 BRPM | HEART RATE: 82 BPM | TEMPERATURE: 97.2 F | WEIGHT: 315 LBS | BODY MASS INDEX: 49.44 KG/M2 | OXYGEN SATURATION: 98 % | DIASTOLIC BLOOD PRESSURE: 74 MMHG

## 2024-04-18 DIAGNOSIS — E11.65 UNCONTROLLED TYPE 2 DIABETES MELLITUS WITH HYPERGLYCEMIA (HCC): Primary | ICD-10-CM

## 2024-04-18 PROCEDURE — 3074F SYST BP LT 130 MM HG: CPT | Performed by: FAMILY MEDICINE

## 2024-04-18 PROCEDURE — 3078F DIAST BP <80 MM HG: CPT | Performed by: FAMILY MEDICINE

## 2024-04-18 PROCEDURE — 3046F HEMOGLOBIN A1C LEVEL >9.0%: CPT | Performed by: FAMILY MEDICINE

## 2024-04-18 PROCEDURE — 99213 OFFICE O/P EST LOW 20 MIN: CPT | Performed by: FAMILY MEDICINE

## 2024-04-18 RX ORDER — BLOOD-GLUCOSE,RECEIVER,CONT
EACH MISCELLANEOUS
Qty: 1 EACH | Refills: 1 | Status: SHIPPED | OUTPATIENT
Start: 2024-04-18

## 2024-04-18 RX ORDER — TIRZEPATIDE 2.5 MG/.5ML
INJECTION, SOLUTION SUBCUTANEOUS
Qty: 4 ML | Refills: 0 | Status: SHIPPED | OUTPATIENT
Start: 2024-04-18

## 2024-04-18 RX ORDER — BLOOD-GLUCOSE SENSOR
EACH MISCELLANEOUS
Qty: 4 EACH | Refills: 5 | Status: SHIPPED | OUTPATIENT
Start: 2024-04-18

## 2024-04-18 ASSESSMENT — ENCOUNTER SYMPTOMS
VISUAL CHANGE: 0
BLURRED VISION: 0

## 2024-04-18 NOTE — PROGRESS NOTES
Michael Zavaleta (:  1975) is a 48 y.o. male,Established patient, here for evaluation of the following chief complaint(s):  Follow-up (Follow up)      Assessment & Plan   1. Uncontrolled type 2 diabetes mellitus with hyperglycemia (HCC)  His home glucose is improving ,  Continue insulin, metformin   Add Mounjaro  Encourage compliance with medication, life style changes  Fu in 4 weeks     -     Continuous Blood Gluc Sensor (FREESTYLE LETICIA 3 SENSOR) MISC; Check blood sugar four times/ day and prn, Disp-4 each, R-5Normal  -     Continuous Blood Gluc  (FREESTYLE LETICIA 3 READER) ADRIAN; Check blood sugar four times/ day and prn, Disp-1 each, R-1Normal  -     Tirzepatide (MOUNJARO) 2.5 MG/0.5ML SOPN SC injection; 2.5 mg SQ every week, Disp-4 mL, R-0Normal  -     [place] - Juany Augustin RDN, Diabetes Education (Non Care Coord Patient), [facility]          Subjective   Diabetes  He presents for his follow-up diabetic visit. He has type 2 diabetes mellitus. His disease course has been improving. There are no hypoglycemic associated symptoms. Pertinent negatives for diabetes include no blurred vision, no chest pain, no fatigue, no foot paresthesias, no foot ulcerations, no polydipsia, no polyphagia, no polyuria, no visual change, no weakness and no weight loss. There are no hypoglycemic complications. Symptoms are improving. Current diabetic treatment includes insulin injections and oral agent (monotherapy). He is compliant with treatment all of the time. He is following a generally unhealthy diet. When asked about meal planning, he reported none. He has not had a previous visit with a dietitian. He never participates in exercise. His home blood glucose trend is decreasing steadily. His breakfast blood glucose is taken between 8-9 am. His breakfast blood glucose range is generally 180-200 mg/dl. His dinner blood glucose is taken between 7-8 pm. His dinner blood glucose range is generally >200

## 2024-04-23 ENCOUNTER — TELEPHONE (OUTPATIENT)
Dept: ADMINISTRATIVE | Age: 49
End: 2024-04-23

## 2024-04-23 DIAGNOSIS — I10 PRIMARY HYPERTENSION: Chronic | ICD-10-CM

## 2024-04-23 RX ORDER — METOPROLOL TARTRATE 50 MG/1
50 TABLET, FILM COATED ORAL 2 TIMES DAILY
Qty: 180 TABLET | Refills: 3 | Status: SHIPPED | OUTPATIENT
Start: 2024-04-23

## 2024-04-23 NOTE — TELEPHONE ENCOUNTER
Medication:   Requested Prescriptions     Pending Prescriptions Disp Refills    metoprolol tartrate (LOPRESSOR) 50 MG tablet [Pharmacy Med Name: METOPROLOL TARTRATE 50 MG TAB] 180 tablet 3     Sig: TAKE 1 TABLET BY MOUTH TWICE A DAY        Last Filled:     03/14/2024        Patient Phone Number: [phone] (home)     Last appt: 4/18/2024   Next appt: 5/16/2024    Last OARRS:       5/3/2019     2:30 PM   RX Monitoring   Attestation The Prescription Monitoring Report for this patient was reviewed today.   Periodic Controlled Substance Monitoring Possible medication side effects, risk of tolerance/dependence & alternative treatments discussed.;No signs of potential drug abuse or diversion identified: otherwise, see note documentation

## 2024-04-23 NOTE — TELEPHONE ENCOUNTER
Received a fax form to complete PA for Tirzepatide (MOUNJARO) 2.5 MG/0.5ML SOPN SC injection to [facility]. COMPLETED AND FAXED BACK WITH [facility].   Attending Only

## 2024-04-25 NOTE — TELEPHONE ENCOUNTER
The medication is APPROVED THRU 12/31/2099    If this requires a response please respond to the pool ( P MHCX PSC MEDICATION PRE-AUTH).      Thank you please advise patient.

## 2024-05-30 ENCOUNTER — TELEMEDICINE (OUTPATIENT)
Dept: FAMILY MEDICINE CLINIC | Age: 49
End: 2024-05-30
Payer: COMMERCIAL

## 2024-05-30 DIAGNOSIS — E78.5 HYPERLIPIDEMIA, UNSPECIFIED HYPERLIPIDEMIA TYPE: Chronic | ICD-10-CM

## 2024-05-30 DIAGNOSIS — E11.65 UNCONTROLLED TYPE 2 DIABETES MELLITUS WITH HYPERGLYCEMIA (HCC): Primary | ICD-10-CM

## 2024-05-30 DIAGNOSIS — E11.40 TYPE 2 DIABETES MELLITUS WITH DIABETIC NEUROPATHY, WITHOUT LONG-TERM CURRENT USE OF INSULIN (HCC): ICD-10-CM

## 2024-05-30 DIAGNOSIS — I10 PRIMARY HYPERTENSION: Chronic | ICD-10-CM

## 2024-05-30 PROCEDURE — 3046F HEMOGLOBIN A1C LEVEL >9.0%: CPT | Performed by: FAMILY MEDICINE

## 2024-05-30 PROCEDURE — 99214 OFFICE O/P EST MOD 30 MIN: CPT | Performed by: FAMILY MEDICINE

## 2024-05-30 RX ORDER — GABAPENTIN 300 MG/1
600 CAPSULE ORAL 2 TIMES DAILY
Qty: 120 CAPSULE | Refills: 1 | Status: SHIPPED | OUTPATIENT
Start: 2024-05-30 | End: 2024-11-26

## 2024-05-30 NOTE — PROGRESS NOTES
Michael Zavaleta, was evaluated through a synchronous (real-time) audio-video encounter. The patient (or guardian if applicable) is aware that this is a billable service, which includes applicable co-pays. This Virtual Visit was conducted with patient's (and/or legal guardian's) consent. Patient identification was verified, and a caregiver was present when appropriate.   The patient was located at Home: [address] [place] [address]  [address]  [place] [address]  Provider was located at Facility (Appt Dept): [address] [place] [address], [address]  [place] [address]  Confirm you are appropriately licensed, registered, or certified to deliver care in the state where the patient is located as indicated above. If you are not or unsure, please re-schedule the visit: Yes, I confirm.     Michael Zavaleta (:  1975) is a Established patient, presenting virtually for evaluation of the following:    Assessment & Plan          Diagnosis Orders   1. Uncontrolled type 2 diabetes mellitus with hyperglycemia (HCC)   His fasting glucose is improving   I advised him to take Metformin BID   Continue insulin   Check fu lab   Encourage compliance with medication, life style changes   metFORMIN (GLUCOPHAGE) 500 MG tablet    Basic Metabolic Panel    Hemoglobin A1C      2. Type 2 diabetes mellitus with diabetic neuropathy, without long-term current use of insulin (HCC)   Improved with Gabapentin   Refilled   Controlled Substances Monitoring: Attestation: The Prescription Monitoring Report for this patient was reviewed today.  (Marsha Uribe MD)  Documentation: No signs of potential drug abuse or diversion identified. (Marsha Uribe MD)   gabapentin (NEURONTIN) 300 MG capsule      3. Primary hypertension   Stable  Continue diuretic , metoprolol, ARB         4. Hyperlipidemia, unspecified hyperlipidemia type   Lipids stable  Continue Crestor              Fu OV in 3 mo    Subjective   HPI    Treatment Adherence:   Medication compliance:

## 2024-06-12 ENCOUNTER — OFFICE VISIT (OUTPATIENT)
Dept: CARDIOLOGY CLINIC | Age: 49
End: 2024-06-12

## 2024-06-12 VITALS
HEART RATE: 95 BPM | BODY MASS INDEX: 60.61 KG/M2 | WEIGHT: 315 LBS | DIASTOLIC BLOOD PRESSURE: 70 MMHG | SYSTOLIC BLOOD PRESSURE: 130 MMHG

## 2024-06-12 DIAGNOSIS — I50.32 CHRONIC HEART FAILURE WITH PRESERVED EJECTION FRACTION (HCC): ICD-10-CM

## 2024-06-12 DIAGNOSIS — I10 PRIMARY HYPERTENSION: Primary | Chronic | ICD-10-CM

## 2024-06-14 NOTE — PROGRESS NOTES
GLUCOSE 431 (H) 03/16/2024    CALCIUM 9.8 03/16/2024    PROT 7.1 01/04/2013    BILITOT 0.4 03/16/2024    ALKPHOS 218 (H) 03/16/2024    AST 34 03/16/2024    ALT 54 (H) 03/16/2024    LABGLOM >60 03/16/2024    GFRAA >60 10/13/2021    AGRATIO 1.4 03/16/2024    GLOB 2.3 10/13/2021         Lab Results   Component Value Date    CHOL 151 03/16/2024    CHOL 116 03/10/2023    CHOL 130 07/04/2021     Lab Results   Component Value Date    TRIG 192 (H) 03/16/2024    TRIG 119 03/10/2023    TRIG 144 07/04/2021     Lab Results   Component Value Date    HDL 37 (L) 03/16/2024    HDL 39 (L) 03/10/2023    HDL 34 (L) 07/04/2021     No components found for: \"LDLCHOLESTEROL\", \"LDLCALC\"    Lab Results   Component Value Date    VLDL 38 03/16/2024    VLDL 24 03/10/2023    VLDL 29 07/04/2021     Lab Results   Component Value Date    CHOLHDLRATIO 4.0 02/04/2014    CHOLHDLRATIO 4.7 01/04/2013       Lab Results   Component Value Date    INR 0.94 07/03/2021    INR 0.98 06/17/2019    INR 2.26 (H) 08/31/2015    PROTIME 10.6 07/03/2021    PROTIME 11.2 06/17/2019    PROTIME 25.5 (H) 08/31/2015       The ASCVD Risk score (Day DK, et al., 2019) failed to calculate for the following reasons:    The patient has a prior MI or stroke diagnosis      Assessment / Plan:      Diagnosis Orders   1. Primary hypertension  Basic Metabolic Panel    Magnesium      2. Chronic heart failure with preserved ejection fraction (HCC)        3. Body mass index (BMI) 60.0-69.9, adult (HCC)             1.  CAD with NSTEMI s/p PCI:  Patient has no concerning symptoms.  His compliant with his medications.     -Continue with aspirin 81 daily, Lipitor 40 daily, Lopressor 50 twice daily.   -Will stop Plavix today.     2.  Essential hypertension:  BP is well controlled with current medications.     -Cw losartan 50 daily, Lopressor 50 twice daily.     3.  Hyperlipidemia:  Lipids at goal.     -Cw lipitor 40     4.  Chronic HFpEF:  Patient appears mildly volume overloaded and hemo

## 2024-06-25 ENCOUNTER — COMMUNITY OUTREACH (OUTPATIENT)
Dept: FAMILY MEDICINE CLINIC | Age: 49
End: 2024-06-25

## 2024-07-29 DIAGNOSIS — E11.40 TYPE 2 DIABETES MELLITUS WITH DIABETIC NEUROPATHY, WITHOUT LONG-TERM CURRENT USE OF INSULIN (HCC): ICD-10-CM

## 2024-07-29 RX ORDER — GABAPENTIN 300 MG/1
600 CAPSULE ORAL 2 TIMES DAILY
Qty: 120 CAPSULE | Refills: 1 | Status: SHIPPED | OUTPATIENT
Start: 2024-07-29 | End: 2024-09-27

## 2024-07-29 NOTE — TELEPHONE ENCOUNTER
Medication:   Requested Prescriptions     Pending Prescriptions Disp Refills    gabapentin (NEURONTIN) 300 MG capsule [Pharmacy Med Name: GABAPENTIN 300MG CAPSULES] 120 capsule 1     Sig: Take 2 capsules by mouth 2 times daily.        Last Filled:  05/30/2024     Patient Phone Number: [phone] (home)     Last appt: 5/30/2024   Next appt: Visit date not found    Last OARRS:       5/3/2019     2:30 PM   RX Monitoring   Attestation The Prescription Monitoring Report for this patient was reviewed today.   Periodic Controlled Substance Monitoring Possible medication side effects, risk of tolerance/dependence & alternative treatments discussed.;No signs of potential drug abuse or diversion identified: otherwise, see note documentation

## 2024-08-01 ENCOUNTER — TELEPHONE (OUTPATIENT)
Dept: ADMINISTRATIVE | Age: 49
End: 2024-08-01

## 2024-08-01 NOTE — TELEPHONE ENCOUNTER
Submitted PA for     gabapentin (NEURONTIN) 300 MG capsule     Via PHONE STATUS: PENDING.    To initiate an authorization request for this medication, please contact [facility] at [phone]. Called and spoke with Joleen. States no PA is required. She ran a test claim and patient was able to pickup the medication.     If this requires a response please respond to the pool ( P MHCX PSC MEDICATION PRE-AUTH).      Thank you

## 2024-09-24 RX ORDER — ATORVASTATIN CALCIUM 40 MG/1
40 TABLET, FILM COATED ORAL NIGHTLY
Qty: 90 TABLET | Refills: 1 | Status: SHIPPED | OUTPATIENT
Start: 2024-09-24

## 2024-10-06 DIAGNOSIS — E11.40 TYPE 2 DIABETES MELLITUS WITH DIABETIC NEUROPATHY, WITHOUT LONG-TERM CURRENT USE OF INSULIN (HCC): ICD-10-CM

## 2024-10-07 RX ORDER — GABAPENTIN 300 MG/1
600 CAPSULE ORAL 2 TIMES DAILY
Qty: 120 CAPSULE | Refills: 0 | Status: SHIPPED | OUTPATIENT
Start: 2024-10-07 | End: 2024-12-06

## 2024-10-07 NOTE — TELEPHONE ENCOUNTER
Medication:   Requested Prescriptions     Pending Prescriptions Disp Refills    gabapentin (NEURONTIN) 300 MG capsule 120 capsule 1     Sig: Take 2 capsules by mouth 2 times daily for 60 days.        Last Filled:  07/29/2024     Patient Phone Number: [phone] (home)     Last appt: 5/30/2024   Next appt: Visit date not found    Last OARRS:       5/3/2019     2:30 PM   RX Monitoring   Attestation The Prescription Monitoring Report for this patient was reviewed today.   Periodic Controlled Substance Monitoring Possible medication side effects, risk of tolerance/dependence & alternative treatments discussed.;No signs of potential drug abuse or diversion identified: otherwise, see note documentation

## 2024-10-14 ENCOUNTER — TELEPHONE (OUTPATIENT)
Dept: FAMILY MEDICINE CLINIC | Age: 49
End: 2024-10-14

## 2024-10-15 NOTE — TELEPHONE ENCOUNTER
The RX on file for  UltiCare Insulin Syringe does not have a GRETA.  The pharmacies will keep running different  METERS/ STRIPS, until one is found that is on the formulary.  Please contact patient to confirm  A READER was received.    For CGMs it is a requirement that the patient can only qualify for CONTINUOUS READINGS if must test more than four times a day.    If this requires a response please respond to the pool ( P MHCX PSC MEDICATION PRE-AUTH).      Thank you please advise patient.

## 2024-10-16 NOTE — TELEPHONE ENCOUNTER
MA place call to [phone] (home)  lm informing patient to call the office to schedule follow up appt. Asked to call the office back at [phone] at his convenience to schedule follow up appt.

## 2024-11-03 DIAGNOSIS — E11.40 TYPE 2 DIABETES MELLITUS WITH DIABETIC NEUROPATHY, WITHOUT LONG-TERM CURRENT USE OF INSULIN (HCC): ICD-10-CM

## 2024-11-03 DIAGNOSIS — F41.8 DEPRESSION WITH ANXIETY: ICD-10-CM

## 2024-11-04 RX ORDER — BUPROPION HYDROCHLORIDE 150 MG/1
TABLET, FILM COATED, EXTENDED RELEASE ORAL
Qty: 180 TABLET | Refills: 3 | Status: SHIPPED | OUTPATIENT
Start: 2024-11-04

## 2024-11-04 RX ORDER — GABAPENTIN 300 MG/1
600 CAPSULE ORAL 2 TIMES DAILY
Qty: 120 CAPSULE | Refills: 0 | OUTPATIENT
Start: 2024-11-04 | End: 2025-01-03

## 2024-11-04 NOTE — TELEPHONE ENCOUNTER
Medication:   Requested Prescriptions     Pending Prescriptions Disp Refills    buPROPion (ZYBAN) 150 MG extended release tablet 180 tablet 3     Sig: TAKE 1 TABLET BY MOUTH TWICE DAILY    gabapentin (NEURONTIN) 300 MG capsule 120 capsule 0     Sig: Take 2 capsules by mouth 2 times daily for 60 days.        Last Filled: 03/14/2024      Patient Phone Number: [phone] (home)     Last appt: 5/30/2024   Next appt: Visit date not found    Last OARRS:       5/3/2019     2:30 PM   RX Monitoring   Attestation The Prescription Monitoring Report for this patient was reviewed today.   Periodic Controlled Substance Monitoring Possible medication side effects, risk of tolerance/dependence & alternative treatments discussed.;No signs of potential drug abuse or diversion identified: otherwise, see note documentation

## 2024-11-05 ENCOUNTER — TELEMEDICINE (OUTPATIENT)
Dept: FAMILY MEDICINE CLINIC | Age: 49
End: 2024-11-05

## 2024-11-05 DIAGNOSIS — R29.898 HAND WEAKNESS: ICD-10-CM

## 2024-11-05 DIAGNOSIS — R20.0 HAND NUMBNESS: ICD-10-CM

## 2024-11-05 DIAGNOSIS — E11.65 UNCONTROLLED TYPE 2 DIABETES MELLITUS WITH HYPERGLYCEMIA (HCC): Primary | ICD-10-CM

## 2024-11-05 DIAGNOSIS — E11.40 TYPE 2 DIABETES MELLITUS WITH DIABETIC NEUROPATHY, WITHOUT LONG-TERM CURRENT USE OF INSULIN (HCC): ICD-10-CM

## 2024-11-05 RX ORDER — GABAPENTIN 300 MG/1
600 CAPSULE ORAL 2 TIMES DAILY
Qty: 120 CAPSULE | Refills: 0 | OUTPATIENT
Start: 2024-11-05 | End: 2025-01-04

## 2024-11-05 RX ORDER — DULAGLUTIDE 0.75 MG/.5ML
0.75 INJECTION, SOLUTION SUBCUTANEOUS WEEKLY
Qty: 3 ADJUSTABLE DOSE PRE-FILLED PEN SYRINGE | Refills: 1 | Status: SHIPPED | OUTPATIENT
Start: 2024-11-05

## 2024-11-05 RX ORDER — GABAPENTIN 300 MG/1
600 CAPSULE ORAL 2 TIMES DAILY
Qty: 120 CAPSULE | Refills: 0 | Status: SHIPPED | OUTPATIENT
Start: 2024-11-05 | End: 2025-01-04

## 2024-11-05 NOTE — ASSESSMENT & PLAN NOTE
Unchanged,   Start Trulicity   Sec effects including GI sx like burping, feeling of fullness, abd pain and serious sec effects including pancreatitis and rarely thyroid cancer discussed with him .   Fu in 1 month       Orders:    gabapentin (NEURONTIN) 300 MG capsule; Take 2 capsules by mouth 2 times daily for 60 days.

## 2024-11-06 ASSESSMENT — ENCOUNTER SYMPTOMS: VISUAL CHANGE: 0

## 2024-11-12 ENCOUNTER — TELEPHONE (OUTPATIENT)
Dept: ADMINISTRATIVE | Age: 49
End: 2024-11-12

## 2024-11-12 NOTE — TELEPHONE ENCOUNTER
Submitted PA for   dulaglutide (TRULICITY) 0.75 MG/0.5ML SOAJ SC injection   called plan @ [phone] STATUS: not required     Spoke with Jillian she states this medication does not require a pa, the patient needs to call to register in the special program @ [phone]. Jillian states   They have left the patient messages with no return call. He must call before he can get the medication              If this requires a response please respond to the pool ( P MHCX PSC MEDICATION PRE-AUTH).      Thank you please advise patient.

## 2024-11-17 DIAGNOSIS — F41.8 DEPRESSION WITH ANXIETY: ICD-10-CM

## 2024-11-18 NOTE — TELEPHONE ENCOUNTER
Medication:   Requested Prescriptions     Pending Prescriptions Disp Refills    sertraline (ZOLOFT) 50 MG tablet [Pharmacy Med Name: sertraline 50 mg tablet] 90 tablet 1     Sig: TAKE 1 TABLET BY MOUTH DAILY        Last Filled:  03/14/2024     Patient Phone Number: [phone] (home)     Last appt: 11/5/2024   Next appt: Visit date not found    Last OARRS:       5/3/2019     2:30 PM   RX Monitoring   Attestation The Prescription Monitoring Report for this patient was reviewed today.   Periodic Controlled Substance Monitoring Possible medication side effects, risk of tolerance/dependence & alternative treatments discussed.;No signs of potential drug abuse or diversion identified: otherwise, see note documentation

## 2024-12-11 DIAGNOSIS — E11.649 UNCONTROLLED TYPE 2 DIABETES MELLITUS WITH HYPOGLYCEMIA WITHOUT COMA (HCC): ICD-10-CM

## 2024-12-12 NOTE — TELEPHONE ENCOUNTER
Medication:   Requested Prescriptions     Pending Prescriptions Disp Refills    insulin 70-30 (NOVOLIN 70/30) (70-30) 100 UNIT per ML injection vial 10 mL 3     Sig: 10 units before breakfast and dinner        Last Filled:  03/26/2024     Patient Phone Number: [phone] (home)     Last appt: 11/5/2024   Next appt: Visit date not found    Last OARRS:       5/3/2019     2:30 PM   RX Monitoring   Attestation The Prescription Monitoring Report for this patient was reviewed today.   Periodic Controlled Substance Monitoring Possible medication side effects, risk of tolerance/dependence & alternative treatments discussed.;No signs of potential drug abuse or diversion identified: otherwise, see note documentation

## 2024-12-15 DIAGNOSIS — E11.40 TYPE 2 DIABETES MELLITUS WITH DIABETIC NEUROPATHY, WITHOUT LONG-TERM CURRENT USE OF INSULIN (HCC): ICD-10-CM

## 2024-12-16 DIAGNOSIS — E11.40 TYPE 2 DIABETES MELLITUS WITH DIABETIC NEUROPATHY, WITHOUT LONG-TERM CURRENT USE OF INSULIN (HCC): ICD-10-CM

## 2024-12-16 RX ORDER — GABAPENTIN 300 MG/1
600 CAPSULE ORAL 2 TIMES DAILY
Qty: 120 CAPSULE | Refills: 0 | Status: SHIPPED | OUTPATIENT
Start: 2024-12-16 | End: 2025-02-14

## 2024-12-16 NOTE — TELEPHONE ENCOUNTER
Medication:   Requested Prescriptions     Pending Prescriptions Disp Refills    gabapentin (NEURONTIN) 300 MG capsule 120 capsule 0     Sig: Take 2 capsules by mouth 2 times daily for 60 days.        Last Filled:      11/05/2024       Patient Phone Number: [phone] (home)     Last appt: 11/5/2024   Next appt: Visit date not found    Last OARRS:       5/3/2019     2:30 PM   RX Monitoring   Attestation The Prescription Monitoring Report for this patient was reviewed today.   Periodic Controlled Substance Monitoring Possible medication side effects, risk of tolerance/dependence & alternative treatments discussed.;No signs of potential drug abuse or diversion identified: otherwise, see note documentation

## 2024-12-17 RX ORDER — GABAPENTIN 300 MG/1
600 CAPSULE ORAL 2 TIMES DAILY
Qty: 120 CAPSULE | Refills: 0 | OUTPATIENT
Start: 2024-12-17

## 2025-01-15 DIAGNOSIS — E11.40 TYPE 2 DIABETES MELLITUS WITH DIABETIC NEUROPATHY, WITHOUT LONG-TERM CURRENT USE OF INSULIN (HCC): ICD-10-CM

## 2025-01-16 DIAGNOSIS — E11.40 TYPE 2 DIABETES MELLITUS WITH DIABETIC NEUROPATHY, WITHOUT LONG-TERM CURRENT USE OF INSULIN (HCC): ICD-10-CM

## 2025-01-16 NOTE — TELEPHONE ENCOUNTER
Medication:   Requested Prescriptions     Pending Prescriptions Disp Refills    gabapentin (NEURONTIN) 300 MG capsule 120 capsule 0     Sig: Take 2 capsules by mouth 2 times daily for 60 days.        Last Filled:  12/16/2024    Patient Phone Number: [phone] (home)     Last appt: 11/5/2024   Next appt: Visit date not found    Last OARRS:       5/3/2019     2:30 PM   RX Monitoring   Attestation The Prescription Monitoring Report for this patient was reviewed today.   Periodic Controlled Substance Monitoring Possible medication side effects, risk of tolerance/dependence & alternative treatments discussed.;No signs of potential drug abuse or diversion identified: otherwise, see note documentation

## 2025-01-17 RX ORDER — GABAPENTIN 300 MG/1
600 CAPSULE ORAL 2 TIMES DAILY
Qty: 120 CAPSULE | Refills: 0 | Status: SHIPPED | OUTPATIENT
Start: 2025-01-17 | End: 2025-04-15

## 2025-01-17 NOTE — TELEPHONE ENCOUNTER
Medication:   Requested Prescriptions     Pending Prescriptions Disp Refills    gabapentin (NEURONTIN) 300 MG capsule [Pharmacy Med Name: GABAPENTIN 300MG CAPSULES] 120 capsule 0     Sig: Take 2 capsules by mouth 2 times daily.        Last Filled:  12/16/2024     Patient Phone Number: [phone] (home)     Last appt: 11/5/2024   Next appt: 1/16/2025    Last OARRS:       5/3/2019     2:30 PM   RX Monitoring   Attestation The Prescription Monitoring Report for this patient was reviewed today.   Periodic Controlled Substance Monitoring Possible medication side effects, risk of tolerance/dependence & alternative treatments discussed.;No signs of potential drug abuse or diversion identified: otherwise, see note documentation

## 2025-01-20 RX ORDER — GABAPENTIN 300 MG/1
600 CAPSULE ORAL 2 TIMES DAILY
Qty: 120 CAPSULE | Refills: 0 | OUTPATIENT
Start: 2025-01-20 | End: 2025-03-21

## 2025-01-21 DIAGNOSIS — E11.65 UNCONTROLLED TYPE 2 DIABETES MELLITUS WITH HYPERGLYCEMIA (HCC): ICD-10-CM

## 2025-01-21 NOTE — TELEPHONE ENCOUNTER
Medication:   Requested Prescriptions     Pending Prescriptions Disp Refills    metFORMIN (GLUCOPHAGE) 500 MG tablet [Pharmacy Med Name: metFORMIN HCl 500 MG Oral Tablet] 180 tablet 0     Sig: TAKE 1 TABLET BY MOUTH TWICE A DAY BEFORE MEALS        Last Filled:      05/30/2024       Patient Phone Number: [phone] (home)     Last appt: 11/5/2024   Next appt: Visit date not found    Last OARRS:       5/3/2019     2:30 PM   RX Monitoring   Attestation The Prescription Monitoring Report for this patient was reviewed today.   Periodic Controlled Substance Monitoring Possible medication side effects, risk of tolerance/dependence & alternative treatments discussed.;No signs of potential drug abuse or diversion identified: otherwise, see note documentation

## 2025-01-22 DIAGNOSIS — E11.65 UNCONTROLLED TYPE 2 DIABETES MELLITUS WITH HYPERGLYCEMIA (HCC): ICD-10-CM

## 2025-01-23 NOTE — TELEPHONE ENCOUNTER
Medication:   Requested Prescriptions     Pending Prescriptions Disp Refills    metFORMIN (GLUCOPHAGE) 500 MG tablet 180 tablet 0     Sig: TAKE 1 TABLET BY MOUTH TWICE A DAY BEFORE MEALS        Last Filled:  01/22/2025       Patient Phone Number: [phone] (home)     Last appt: 11/5/2024   Next appt: Visit date not found    Last OARRS:       5/3/2019     2:30 PM   RX Monitoring   Attestation The Prescription Monitoring Report for this patient was reviewed today.   Periodic Controlled Substance Monitoring Possible medication side effects, risk of tolerance/dependence & alternative treatments discussed.;No signs of potential drug abuse or diversion identified: otherwise, see note documentation

## 2025-02-14 DIAGNOSIS — E11.40 TYPE 2 DIABETES MELLITUS WITH DIABETIC NEUROPATHY, WITHOUT LONG-TERM CURRENT USE OF INSULIN (HCC): ICD-10-CM

## 2025-02-14 RX ORDER — GABAPENTIN 300 MG/1
600 CAPSULE ORAL 2 TIMES DAILY
Qty: 120 CAPSULE | Refills: 0 | OUTPATIENT
Start: 2025-02-14

## 2025-02-14 RX ORDER — GABAPENTIN 300 MG/1
600 CAPSULE ORAL 2 TIMES DAILY
Qty: 120 CAPSULE | Refills: 0 | Status: SHIPPED | OUTPATIENT
Start: 2025-02-14 | End: 2025-05-13

## 2025-02-14 NOTE — TELEPHONE ENCOUNTER
Medication:   Requested Prescriptions     Pending Prescriptions Disp Refills    gabapentin (NEURONTIN) 300 MG capsule 120 capsule 0     Sig: Take 2 capsules by mouth 2 times daily for 88 days.        Last Filled:     01/17/2025        Patient Phone Number: [phone] (home)     Last appt: 11/5/2024   Next appt: 2/17/2025    Last OARRS:       5/3/2019     2:30 PM   RX Monitoring   Attestation The Prescription Monitoring Report for this patient was reviewed today.   Periodic Controlled Substance Monitoring Possible medication side effects, risk of tolerance/dependence & alternative treatments discussed.;No signs of potential drug abuse or diversion identified: otherwise, see note documentation

## 2025-02-14 NOTE — TELEPHONE ENCOUNTER
Medication:   Requested Prescriptions     Pending Prescriptions Disp Refills    gabapentin (NEURONTIN) 300 MG capsule [Pharmacy Med Name: GABAPENTIN 300MG CAPSULES] 120 capsule 0     Sig: Take 2 capsules by mouth 2 times daily.        Last Filled:      Patient Phone Number: [phone] (home)     Last appt: 11/5/2024   Next appt: 2/14/2025    Last OARRS:       5/3/2019     2:30 PM   RX Monitoring   Attestation The Prescription Monitoring Report for this patient was reviewed today.   Periodic Controlled Substance Monitoring Possible medication side effects, risk of tolerance/dependence & alternative treatments discussed.;No signs of potential drug abuse or diversion identified: otherwise, see note documentation     Duplicate request

## 2025-02-17 ENCOUNTER — TELEMEDICINE (OUTPATIENT)
Dept: FAMILY MEDICINE CLINIC | Age: 50
End: 2025-02-17
Payer: COMMERCIAL

## 2025-02-17 ENCOUNTER — TELEPHONE (OUTPATIENT)
Dept: FAMILY MEDICINE CLINIC | Age: 50
End: 2025-02-17

## 2025-02-17 DIAGNOSIS — E11.40 TYPE 2 DIABETES MELLITUS WITH DIABETIC NEUROPATHY, WITHOUT LONG-TERM CURRENT USE OF INSULIN (HCC): Primary | ICD-10-CM

## 2025-02-17 DIAGNOSIS — I50.32 CHRONIC HEART FAILURE WITH PRESERVED EJECTION FRACTION (HCC): ICD-10-CM

## 2025-02-17 DIAGNOSIS — I87.2 VENOUS ULCER OF LEFT LOWER EXTREMITY WITHOUT VARICOSE VEINS (HCC): ICD-10-CM

## 2025-02-17 DIAGNOSIS — L97.929 VENOUS ULCER OF LEFT LOWER EXTREMITY WITHOUT VARICOSE VEINS (HCC): ICD-10-CM

## 2025-02-17 PROCEDURE — 99214 OFFICE O/P EST MOD 30 MIN: CPT | Performed by: FAMILY MEDICINE

## 2025-02-17 ASSESSMENT — ENCOUNTER SYMPTOMS
VISUAL CHANGE: 0
SHORTNESS OF BREATH: 0
ABDOMINAL PAIN: 0

## 2025-02-17 NOTE — PROGRESS NOTES
Michael Zavaleta, was evaluated through a synchronous (real-time) audio-video encounter. The patient (or guardian if applicable) is aware that this is a billable service, which includes applicable co-pays. This Virtual Visit was conducted with patient's (and/or legal guardian's) consent. Patient identification was verified, and a caregiver was present when appropriate.   The patient was located at Home: [id number] [place] [address]  [address]  [place] [address]  Provider was located at Facility (Appt Dept): [address] [place] [address], [address]  [place] [address]  Confirm you are appropriately licensed, registered, or certified to deliver care in the state where the patient is located as indicated above. If you are not or unsure, please re-schedule the visit: Yes, I confirm.     Michael Zavaleta (:  1975) is a Established patient, presenting virtually for evaluation of the following:      Below is the assessment and plan developed based on review of pertinent history, physical exam, labs, studies, and medications.     Assessment & Plan  Type 2 diabetes mellitus with diabetic neuropathy, without long-term current use of insulin (HCC)   Check fu lab  I will add Mounjaro and adjust Metformin dose once I receive the results   Continue Novolon 10 units bid   (He got rx for Trulicity but at that time his ins was did not covered)     Fu 1 mo    Orders:    Comprehensive Metabolic Panel; Future    Hemoglobin A1C; Future    Lipid Panel; Future    Albumin/Creatinine Ratio, Urine; Future    Venous ulcer of left lower extremity without varicose veins (HCC)    I recommended him to consult with vascular sx, he has the # and will call to set up an apt          Chronic heart failure with preserved ejection fraction (HCC)    stable  Continue metoprolol,          No follow-ups on file.       Subjective   Diabetes  He presents for his follow-up diabetic visit. He has type 2 diabetes mellitus. His disease course has been stable. There

## 2025-02-17 NOTE — ASSESSMENT & PLAN NOTE
I recommended him to consult with vascular sx, he has the # and will call to set up an apt

## 2025-02-17 NOTE — ASSESSMENT & PLAN NOTE
Check fu lab  I will add Mounjaro and adjust Metformin dose once I receive the results   Continue Novolon 10 units bid   (He got rx for Trulicity but at that time his ins was did not covered)     Fu 1 mo    Orders:    Comprehensive Metabolic Panel; Future    Hemoglobin A1C; Future    Lipid Panel; Future    Albumin/Creatinine Ratio, Urine; Future

## 2025-02-17 NOTE — TELEPHONE ENCOUNTER
Left vm to reschedule appt from VV to OV    PCP needs to see pt in office    Appt notes stated wellness visit (scheduled in [facility])

## 2025-02-18 NOTE — TELEPHONE ENCOUNTER
Submitted PA for Gabapentin 300MG capsules   Via [facility] Key: [id number] STATUS: PENDING.    Follow up done daily; if no decision with in three days we will refax.  If another three days goes by with no decision will call the insurance for status.

## 2025-02-19 NOTE — TELEPHONE ENCOUNTER
The medication is APPROVED.    Outcome  Approved on February 18 by [employer] 2017  CaseId:[id number];Status:Approved;Review Type:Qty;Coverage Start Date:01/19/2025;Coverage End Date:02/18/2026;  Authorization Expiration Date: 2/17/2026    If this requires a response please respond to the pool ( P [facility] P[facility] MEDICATION PRE-AUTH).      Thank you please advise patient.

## 2025-02-19 NOTE — TELEPHONE ENCOUNTER
MA placed call to [phone] (home)  lm informing pt that Rx has been approved and that he can call his pharmacy to have them run it through the system and get it ready for him.

## 2025-02-20 DIAGNOSIS — E11.40 TYPE 2 DIABETES MELLITUS WITH DIABETIC NEUROPATHY, WITHOUT LONG-TERM CURRENT USE OF INSULIN (HCC): ICD-10-CM

## 2025-02-21 LAB
ALBUMIN SERPL-MCNC: 4.4 G/DL (ref 3.4–5)
ALBUMIN/GLOB SERPL: 1.8 {RATIO} (ref 1.1–2.2)
ALP SERPL-CCNC: 151 U/L (ref 40–129)
ALT SERPL-CCNC: 38 U/L (ref 10–40)
ANION GAP SERPL CALCULATED.3IONS-SCNC: 13 MMOL/L (ref 3–16)
AST SERPL-CCNC: 21 U/L (ref 15–37)
BILIRUB SERPL-MCNC: 0.4 MG/DL (ref 0–1)
BUN SERPL-MCNC: 21 MG/DL (ref 7–20)
CALCIUM SERPL-MCNC: 9.8 MG/DL (ref 8.3–10.6)
CHLORIDE SERPL-SCNC: 101 MMOL/L (ref 99–110)
CHOLEST SERPL-MCNC: 144 MG/DL (ref 0–199)
CO2 SERPL-SCNC: 26 MMOL/L (ref 21–32)
CREAT SERPL-MCNC: 1.1 MG/DL (ref 0.9–1.3)
EST. AVERAGE GLUCOSE BLD GHB EST-MCNC: 180 MG/DL
GFR SERPLBLD CREATININE-BSD FMLA CKD-EPI: 82 ML/MIN/{1.73_M2}
GLUCOSE SERPL-MCNC: 128 MG/DL (ref 70–99)
HBA1C MFR BLD: 7.9 %
HDLC SERPL-MCNC: 40 MG/DL (ref 40–60)
LDLC SERPL CALC-MCNC: 74 MG/DL
POTASSIUM SERPL-SCNC: 4.4 MMOL/L (ref 3.5–5.1)
PROT SERPL-MCNC: 6.9 G/DL (ref 6.4–8.2)
SODIUM SERPL-SCNC: 140 MMOL/L (ref 136–145)
TRIGL SERPL-MCNC: 150 MG/DL (ref 0–150)
VLDLC SERPL CALC-MCNC: 30 MG/DL

## 2025-02-26 ENCOUNTER — TELEPHONE (OUTPATIENT)
Dept: FAMILY MEDICINE CLINIC | Age: 50
End: 2025-02-26

## 2025-02-26 DIAGNOSIS — E11.65 UNCONTROLLED TYPE 2 DIABETES MELLITUS WITH HYPERGLYCEMIA (HCC): Primary | ICD-10-CM

## 2025-03-03 NOTE — TELEPHONE ENCOUNTER
Please place a script for the Mounjaro so pa can be started, I do  not see a active script in epic.     Thanks     If this requires a response please respond to the pool ( P [facility] PSC MEDICATION PRE-AUTH).      Thank you please advise patient.

## 2025-03-05 ENCOUNTER — TELEPHONE (OUTPATIENT)
Dept: FAMILY MEDICINE CLINIC | Age: 50
End: 2025-03-05

## 2025-03-05 NOTE — TELEPHONE ENCOUNTER
Pt requesting prior authorization but there hasn't been an active prescription since 4/18/24. PA department stated that they need an active prescription before they can send prior auth.   Tirzepatide (MOUNJARO) 2.5 MG/0.5ML SOPN SC injection   Pt contact is [phone].

## 2025-03-07 ENCOUNTER — TELEPHONE (OUTPATIENT)
Dept: FAMILY MEDICINE CLINIC | Age: 50
End: 2025-03-07

## 2025-03-07 NOTE — TELEPHONE ENCOUNTER
Submitted PA for Mounjaro 2.5MG/0.5ML auto-injectors   Via [facility] Key: [id number]  STATUS: PENDING.    Follow up done daily; if no decision with in three days we will refax.  If another three days goes by with no decision will call the insurance for status.

## 2025-03-07 NOTE — TELEPHONE ENCOUNTER
Pt would like to know if he is to take his insulin along with Metformin    Please call back and advise

## 2025-03-10 ENCOUNTER — PATIENT MESSAGE (OUTPATIENT)
Dept: FAMILY MEDICINE CLINIC | Age: 50
End: 2025-03-10

## 2025-03-10 NOTE — TELEPHONE ENCOUNTER
Patient is wanting to know if you want him to continue taking the insulin and metformin even though you put him on Mounjaro?

## 2025-03-10 NOTE — TELEPHONE ENCOUNTER
The medication is APPROVED.    Outcome  Approved on March 7 by [employer] 2017  CaseId:[id number];Status:Approved;Review Type:Prior Auth;Coverage Start Date:02/05/2025;Coverage End Date:03/07/2026;  Effective Date: 2/4/2025  Authorization Expiration Date: 3/6/2026    If this requires a response please respond to the pool ( P [facility] P[facility] MEDICATION PRE-AUTH).      Thank you please advise patient.

## 2025-03-17 DIAGNOSIS — E11.40 TYPE 2 DIABETES MELLITUS WITH DIABETIC NEUROPATHY, WITHOUT LONG-TERM CURRENT USE OF INSULIN (HCC): ICD-10-CM

## 2025-03-18 NOTE — TELEPHONE ENCOUNTER
Medication:   Requested Prescriptions     Pending Prescriptions Disp Refills    gabapentin (NEURONTIN) 300 MG capsule [Pharmacy Med Name: GABAPENTIN 300MG CAPSULES] 120 capsule 0     Sig: Take 2 capsules by mouth 2 times daily.        Last Filled: 02/14/2025      Patient Phone Number: [phone] (home)     Last appt: 2/17/2025   Next appt: 3/20/2025    Last OARRS:       5/3/2019     2:30 PM   RX Monitoring   Attestation The Prescription Monitoring Report for this patient was reviewed today.   Periodic Controlled Substance Monitoring Possible medication side effects, risk of tolerance/dependence & alternative treatments discussed.;No signs of potential drug abuse or diversion identified: otherwise, see note documentation

## 2025-03-19 RX ORDER — GABAPENTIN 300 MG/1
600 CAPSULE ORAL 2 TIMES DAILY
Qty: 120 CAPSULE | Refills: 1 | Status: SHIPPED | OUTPATIENT
Start: 2025-03-19 | End: 2025-06-17

## 2025-04-08 ENCOUNTER — TELEMEDICINE (OUTPATIENT)
Dept: FAMILY MEDICINE CLINIC | Age: 50
End: 2025-04-08

## 2025-04-08 DIAGNOSIS — E11.40 TYPE 2 DIABETES MELLITUS WITH DIABETIC NEUROPATHY, WITHOUT LONG-TERM CURRENT USE OF INSULIN (HCC): ICD-10-CM

## 2025-04-08 DIAGNOSIS — E11.65 UNCONTROLLED TYPE 2 DIABETES MELLITUS WITH HYPERGLYCEMIA (HCC): Primary | ICD-10-CM

## 2025-04-08 RX ORDER — HUMAN INSULIN 100 [IU]/ML
INJECTION, SUSPENSION SUBCUTANEOUS
Qty: 5 ADJUSTABLE DOSE PRE-FILLED PEN SYRINGE | Refills: 3 | Status: SHIPPED | OUTPATIENT
Start: 2025-04-08 | End: 2025-04-09

## 2025-04-08 RX ORDER — PEN NEEDLE, DIABETIC 29 GAUGE
1 NEEDLE, DISPOSABLE MISCELLANEOUS DAILY
Qty: 100 EACH | Refills: 3 | Status: SHIPPED | OUTPATIENT
Start: 2025-04-08

## 2025-04-08 SDOH — ECONOMIC STABILITY: FOOD INSECURITY: WITHIN THE PAST 12 MONTHS, THE FOOD YOU BOUGHT JUST DIDN'T LAST AND YOU DIDN'T HAVE MONEY TO GET MORE.: NEVER TRUE

## 2025-04-08 SDOH — ECONOMIC STABILITY: INCOME INSECURITY: IN THE LAST 12 MONTHS, WAS THERE A TIME WHEN YOU WERE NOT ABLE TO PAY THE MORTGAGE OR RENT ON TIME?: NO

## 2025-04-08 SDOH — ECONOMIC STABILITY: FOOD INSECURITY: WITHIN THE PAST 12 MONTHS, YOU WORRIED THAT YOUR FOOD WOULD RUN OUT BEFORE YOU GOT MONEY TO BUY MORE.: NEVER TRUE

## 2025-04-08 SDOH — ECONOMIC STABILITY: TRANSPORTATION INSECURITY
IN THE PAST 12 MONTHS, HAS THE LACK OF TRANSPORTATION KEPT YOU FROM MEDICAL APPOINTMENTS OR FROM GETTING MEDICATIONS?: NO

## 2025-04-08 SDOH — ECONOMIC STABILITY: TRANSPORTATION INSECURITY
IN THE PAST 12 MONTHS, HAS LACK OF TRANSPORTATION KEPT YOU FROM MEETINGS, WORK, OR FROM GETTING THINGS NEEDED FOR DAILY LIVING?: NO

## 2025-04-08 NOTE — PROGRESS NOTES
Michael Zavaleta, was evaluated through a synchronous (real-time) audio-video encounter. The patient (or guardian if applicable) is aware that this is a billable service, which includes applicable co-pays. This Virtual Visit was conducted with patient's (and/or legal guardian's) consent. Patient identification was verified, and a caregiver was present when appropriate.   The patient was located at Home: [address] [place] [address]  [address]  [place] [address]  Provider was located at Facility (Appt Dept): [id number] [place] [address], [address]  [place] [address]  Confirm you are appropriately licensed, registered, or certified to deliver care in the state where the patient is located as indicated above. If you are not or unsure, please re-schedule the visit: Yes, I confirm.     Michael Zavaleta (:  1975) is a Established patient, presenting virtually for evaluation of the following:      Below is the assessment and plan developed based on review of pertinent history, physical exam, labs, studies, and medications.     Assessment & Plan  Type 2 diabetes mellitus with diabetic neuropathy, without long-term current use of insulin (HCC)   Per his report his home readings are improving   Increase Mounjaro to 5 mg and continue Novolin (refilled)  Check fu lab as recommended.     Fu in 3mo  OV     Orders:    Tirzepatide 5 MG/0.5ML SOAJ; Inject 5 mg into the skin every 7 days    Insulin NPH Isophane & Regular (NOVOLIN 70/30 FLEXPEN) (70-30) 100 UNIT per ML injection pen; 10 units sub before breakfast and lunch    Insulin Pen Needle ([employer] PEN NEEDLES 29G) 29G X 12MM MISC; 1 each by Does not apply route daily    Albumin/Creatinine Ratio, Urine; Future    Uncontrolled type 2 diabetes mellitus with hyperglycemia (HCC)   Above      Orders:    Tirzepatide 5 MG/0.5ML SOAJ; Inject 5 mg into the skin every 7 days    Insulin NPH Isophane & Regular (NOVOLIN 70/30 FLEXPEN) (70-30) 100 UNIT per ML injection pen; 10 units sub before

## 2025-04-08 NOTE — ASSESSMENT & PLAN NOTE
Per his report his home readings are improving   Increase Mounjaro to 5 mg and continue Novolin (refilled)  Check fu lab as recommended.     Fu in 3mo  OV     Orders:    Tirzepatide 5 MG/0.5ML SOAJ; Inject 5 mg into the skin every 7 days    Insulin NPH Isophane & Regular (NOVOLIN 70/30 FLEXPEN) (70-30) 100 UNIT per ML injection pen; 10 units sub before breakfast and lunch    Insulin Pen Needle ([employer] PEN NEEDLES 29G) 29G X 12MM MISC; 1 each by Does not apply route daily    Albumin/Creatinine Ratio, Urine; Future

## 2025-04-09 ASSESSMENT — ENCOUNTER SYMPTOMS
BLURRED VISION: 0
VISUAL CHANGE: 0

## 2025-04-11 NOTE — PROGRESS NOTES
BS elevated; will increase Lantus to 25 units; continue SSI;monitor BS      hours on, 12 hours off., Disp: 10 patch, Rfl: 0    cyclobenzaprine (FLEXERIL) 10 MG tablet, Take 1 tablet by mouth 3 times daily as needed for Muscle spasms, Disp: 10 tablet, Rfl: 0    acetaminophen (TYLENOL) 325 MG tablet, Take 1 tablet by mouth every 6 hours as needed for Pain, Disp: 60 tablet, Rfl: 0    sertraline (ZOLOFT) 50 MG tablet, TAKE ONE TABLET BY MOUTH ONCE DAILY, Disp: 90 tablet, Rfl: 1    metFORMIN (GLUCOPHAGE) 500 MG tablet, TAKE 1 TABLET BY MOUTH ONCE DAILY WITH BREAKFAST, Disp: 90 tablet, Rfl: 1    losartan-hydrochlorothiazide (HYZAAR) 100-25 MG per tablet, TAKE 1 TABLET BY MOUTH ONCE DAILY, Disp: 90 tablet, Rfl: 1    Cholecalciferol (VITAMIN D) 2000 units CAPS capsule, One po qd, Disp: 90 capsule, Rfl: 1    buPROPion (WELLBUTRIN SR) 150 MG extended release tablet, One po bid, Disp: 180 tablet, Rfl: 1    amLODIPine (NORVASC) 2.5 MG tablet, One po qd, Disp: 90 tablet, Rfl: 1    ferrous sulfate 325 (65 Fe) MG tablet, Take 65 mg by mouth daily (with breakfast), Disp: , Rfl:     Probiotic Product (PROBIOTIC DAILY PO), Take by mouth, Disp: , Rfl:      has a past medical history of Abdominal hernia, Alcohol abuse, Anxiety, Clostridium difficile diarrhea, Clostridium difficile diarrhea, DVT of axillary vein, acute left (HCC), Hernia, History of blood transfusion, Hyperlipemia, Hypertension, Kidney congenitally absent, left, Kidney disease, Lightheaded, Obesity, Obstructive sleep apnea (adult) (pediatric), Renal agenesis, and Severe single current episode of major depressive disorder, without psychotic features ([facility] [address] Past Surgical History:   Procedure Laterality Date    HERNIA REPAIR      2011    OTHER SURGICAL HISTORY  4/20/2015    EXCISIONAL DEBRIDEMENT AND IRRIGATION OF ABDOMINAL WOUND    TONSILLECTOMY AND ADENOIDECTOMY      VENTRAL HERNIA REPAIR  3/23/15    with mesh    VENTRAL HERNIA REPAIR  6/2/16        reports that he quit smoking about 21 months ago.  He has a 2.00 pack-year smoking history. He has never used smokeless tobacco. He reports that he does not drink alcohol or use drugs. family history includes Cancer in his father; Coronary Art Dis (age of onset: 36) in his mother; High Blood Pressure in his mother and sister; Other in his mother and sister; Stroke (age of onset: 27) in his mother. Objective:  Blood pressure 136/82, pulse 96, temperature 97.8 °F (36.6 °C), temperature source Tympanic, resp. rate 16, height 5' 8\" (1.727 m), weight (!) 378 lb (171.5 kg), SpO2 96 %. Physical Exam   Constitutional: He is oriented to person, place, and time. He appears well-developed and well-nourished. No distress. HENT:   Head: Normocephalic. Mouth/Throat: Oropharynx is clear and moist.   Eyes: Pupils are equal, round, and reactive to light. Conjunctivae and EOM are normal.   Neck: Normal range of motion. Neck supple. No thyromegaly present. Cardiovascular: Normal rate, regular rhythm, S2 normal, normal heart sounds and intact distal pulses. No extrasystoles are present. Pulmonary/Chest: Effort normal and breath sounds normal. No stridor. No respiratory distress. He has no wheezes. He has no rales. He exhibits no tenderness. Musculoskeletal: He exhibits no edema. Cervical back: He exhibits normal range of motion, no tenderness, no bony tenderness, no pain and no spasm. Thoracic back: He exhibits normal range of motion, no tenderness, no bony tenderness, no pain and no spasm. Lumbar back: He exhibits decreased range of motion, pain and spasm. He exhibits no tenderness, no bony tenderness, no swelling and no edema. Lymphadenopathy:     He has no cervical adenopathy. Neurological: He is alert and oriented to person, place, and time. He has normal strength and normal reflexes. He is not disoriented. He displays no atrophy, no tremor and normal reflexes. No cranial nerve deficit. He exhibits normal muscle tone.  Coordination normal.   Reflex Scores: Patellar reflexes are 2+ on the right side and 2+ on the left side. Skin: Skin is warm. Capillary refill takes less than 2 seconds. No rash noted. He is not diaphoretic. No erythema. No pallor. Psychiatric: He has a normal mood and affect. His behavior is normal.   Nursing note and vitals reviewed. Assessment:       Diagnosis Orders   1. Strain of lumbar region, initial encounter  traMADol (ULTRAM) 50 MG tablet           Plan:        I estimate there is LOW risk for ABDOMINAL AORTIC ANEURYSM, CAUDA EQUINA SYNDROME, EPIDURAL MASS LESION, OR CORD COMPRESSION, thus I consider symptomatic tx is appropriate at this time. I have discussed the diagnosis and risks, I recommended patient to go to the Emergency Department immediately if new or worsening symptoms occur. We have discussed the symptoms which are most concerning (e.g., saddle anesthesia, urinary or bowel incontinence or retention, changing or worsening pain) that necessitate immediate evaluation  patient agrees and verbalizes understanding    nsaid contraindicated     Tramadol   secondary effects and med interaction, discussed  patient agrees and verbalizes understanding    patient has been instructed caution with driving or handling of heavy machinery while taking  this medication as it  can cause drowsiness,  patient agrees and verbalizes understanding     Controlled Substances Monitoring: Attestation: The Prescription Monitoring Report for this patient was reviewed today. Yolanda Quintero MD)  Documentation: No signs of potential drug abuse or diversion identified.  Yolanda Quintero MD)            Kodi Schwab MD

## 2025-04-14 NOTE — TELEPHONE ENCOUNTER
Requested Prescriptions     Pending Prescriptions Disp Refills    spironolactone (ALDACTONE) 25 MG tablet 90 tablet 3     Sig: Take 1 tablet by mouth daily            Checked Correct Pharmacy: Yes    Any changes since last refill? No     Number: 90  Refills: 3    Last OV: 6/12/2024 Provider: RADHA    Next OV: 5/23/2025 Provider: RADHA    Last Labs:   CMP:   Lab Results   Component Value Date     02/20/2025    K 4.4 02/20/2025     02/20/2025    CO2 26 02/20/2025    BUN 21 (H) 02/20/2025    CREATININE 1.1 02/20/2025    GLUCOSE 128 (H) 02/20/2025    CALCIUM 9.8 02/20/2025    BILITOT 0.4 02/20/2025    ALKPHOS 151 (H) 02/20/2025    AST 21 02/20/2025    ALT 38 02/20/2025    LABGLOM 82 02/20/2025    GFRAA >60 10/13/2021    AGRATIO 1.8 02/20/2025    GLOB 2.3 10/13/2021

## 2025-04-15 RX ORDER — SPIRONOLACTONE 25 MG/1
25 TABLET ORAL DAILY
Qty: 90 TABLET | Refills: 3 | Status: SHIPPED | OUTPATIENT
Start: 2025-04-15

## 2025-04-18 DIAGNOSIS — E11.40 TYPE 2 DIABETES MELLITUS WITH DIABETIC NEUROPATHY, WITHOUT LONG-TERM CURRENT USE OF INSULIN (HCC): ICD-10-CM

## 2025-04-21 RX ORDER — GABAPENTIN 300 MG/1
600 CAPSULE ORAL 2 TIMES DAILY
Qty: 120 CAPSULE | Refills: 1 | Status: SHIPPED | OUTPATIENT
Start: 2025-04-21 | End: 2025-07-20

## 2025-04-21 NOTE — TELEPHONE ENCOUNTER
Medication:   Requested Prescriptions     Pending Prescriptions Disp Refills    gabapentin (NEURONTIN) 300 MG capsule 120 capsule 1     Sig: Take 2 capsules by mouth 2 times daily for 90 days.        Last Filled:  3/19/25    Patient Phone Number: [phone] (home)     Last appt: 4/8/2025   Next appt: Visit date not found    Last OARRS:       5/3/2019     2:30 PM   RX Monitoring   Attestation The Prescription Monitoring Report for this patient was reviewed today.   Periodic Controlled Substance Monitoring Possible medication side effects, risk of tolerance/dependence & alternative treatments discussed.;No signs of potential drug abuse or diversion identified: otherwise, see note documentation

## 2025-04-24 DIAGNOSIS — E11.65 UNCONTROLLED TYPE 2 DIABETES MELLITUS WITH HYPERGLYCEMIA (HCC): ICD-10-CM

## 2025-04-24 NOTE — TELEPHONE ENCOUNTER
Medication:   Requested Prescriptions     Pending Prescriptions Disp Refills    metFORMIN (GLUCOPHAGE) 500 MG tablet 180 tablet 0     Sig: TAKE 1 TABLET BY MOUTH TWICE A DAY BEFORE MEALS        Last Filled:  1/24/25    Patient Phone Number: [phone] (home)     Last appt: 4/8/2025   Next appt: Visit date not found    Last OARRS:       5/3/2019     2:30 PM   RX Monitoring   Attestation The Prescription Monitoring Report for this patient was reviewed today.   Periodic Controlled Substance Monitoring Possible medication side effects, risk of tolerance/dependence & alternative treatments discussed.;No signs of potential drug abuse or diversion identified: otherwise, see note documentation

## 2025-04-29 NOTE — TELEPHONE ENCOUNTER
Medication:   Requested Prescriptions     Pending Prescriptions Disp Refills    atorvastatin (LIPITOR) 40 MG tablet 90 tablet 1     Sig: Take 1 tablet by mouth nightly        Last Filled:  9/24/24    Patient Phone Number: [phone] (home)     Last appt: 4/8/2025   Next appt: Visit date not found    Last OARRS:       5/3/2019     2:30 PM   RX Monitoring   Attestation The Prescription Monitoring Report for this patient was reviewed today.   Periodic Controlled Substance Monitoring Possible medication side effects, risk of tolerance/dependence & alternative treatments discussed.;No signs of potential drug abuse or diversion identified: otherwise, see note documentation

## 2025-04-30 RX ORDER — ATORVASTATIN CALCIUM 40 MG/1
40 TABLET, FILM COATED ORAL NIGHTLY
Qty: 90 TABLET | Refills: 1 | Status: SHIPPED | OUTPATIENT
Start: 2025-04-30

## 2025-05-01 DIAGNOSIS — E11.65 UNCONTROLLED TYPE 2 DIABETES MELLITUS WITH HYPERGLYCEMIA (HCC): ICD-10-CM

## 2025-05-01 DIAGNOSIS — E11.40 TYPE 2 DIABETES MELLITUS WITH DIABETIC NEUROPATHY, WITHOUT LONG-TERM CURRENT USE OF INSULIN (HCC): ICD-10-CM

## 2025-05-01 NOTE — TELEPHONE ENCOUNTER
Medication:   Requested Prescriptions     Pending Prescriptions Disp Refills    Tirzepatide (MOUNJARO) 5 MG/0.5ML SOAJ pen 2 mL 0     Sig: Inject 5 mg into the skin every 7 days        Last Filled:  4/8/25    Patient Phone Number: [phone] (home)     Last appt: 4/8/2025   Next appt: Visit date not found    Last OARRS:       5/3/2019     2:30 PM   RX Monitoring   Attestation The Prescription Monitoring Report for this patient was reviewed today.   Periodic Controlled Substance Monitoring Possible medication side effects, risk of tolerance/dependence & alternative treatments discussed.;No signs of potential drug abuse or diversion identified: otherwise, see note documentation

## 2025-05-02 DIAGNOSIS — I10 PRIMARY HYPERTENSION: Chronic | ICD-10-CM

## 2025-05-02 NOTE — TELEPHONE ENCOUNTER
Medication:   Requested Prescriptions     Pending Prescriptions Disp Refills    losartan (COZAAR) 50 MG tablet 90 tablet 3     Sig: Take 1 tablet by mouth daily        Last Filled:  3/14/24    Patient Phone Number: [phone] (home)     Last appt: 4/8/2025   Next appt: Visit date not found    Last OARRS:       5/3/2019     2:30 PM   RX Monitoring   Attestation The Prescription Monitoring Report for this patient was reviewed today.   Periodic Controlled Substance Monitoring Possible medication side effects, risk of tolerance/dependence & alternative treatments discussed.;No signs of potential drug abuse or diversion identified: otherwise, see note documentation

## 2025-05-04 DIAGNOSIS — I10 PRIMARY HYPERTENSION: Chronic | ICD-10-CM

## 2025-05-05 RX ORDER — LOSARTAN POTASSIUM 50 MG/1
50 TABLET ORAL DAILY
Qty: 90 TABLET | Refills: 3 | OUTPATIENT
Start: 2025-05-05

## 2025-05-05 RX ORDER — LOSARTAN POTASSIUM 50 MG/1
50 TABLET ORAL DAILY
Qty: 90 TABLET | Refills: 3 | Status: SHIPPED | OUTPATIENT
Start: 2025-05-05

## 2025-05-05 NOTE — TELEPHONE ENCOUNTER
Medication:   Requested Prescriptions     Pending Prescriptions Disp Refills    losartan (COZAAR) 50 MG tablet 90 tablet 3     Sig: Take 1 tablet by mouth daily        Last Filled:  3/14/24    Patient Phone Number: [phone] (home)     Last appt: 4/8/2025   Next appt: Visit date not found    Last OARRS:       5/3/2019     2:30 PM   RX Monitoring   Attestation The Prescription Monitoring Report for this patient was reviewed today.   Periodic Controlled Substance Monitoring Possible medication side effects, risk of tolerance/dependence & alternative treatments discussed.;No signs of potential drug abuse or diversion identified: otherwise, see note documentation

## 2025-05-07 DIAGNOSIS — I10 PRIMARY HYPERTENSION: Chronic | ICD-10-CM

## 2025-05-07 DIAGNOSIS — F41.8 DEPRESSION WITH ANXIETY: ICD-10-CM

## 2025-05-07 RX ORDER — METOPROLOL TARTRATE 50 MG
50 TABLET ORAL 2 TIMES DAILY
Qty: 180 TABLET | Refills: 3 | Status: SHIPPED | OUTPATIENT
Start: 2025-05-07

## 2025-05-07 NOTE — TELEPHONE ENCOUNTER
Medication:   Requested Prescriptions     Pending Prescriptions Disp Refills    metoprolol tartrate (LOPRESSOR) 50 MG tablet 180 tablet 3     Sig: Take 1 tablet by mouth 2 times daily        Last Filled:     04/23/2024 04/23/2024        Patient Phone Number: [phone] (home)     Last appt: 4/8/2025   Next appt: Visit date not found    Last OARRS:       5/3/2019     2:30 PM   RX Monitoring   Attestation The Prescription Monitoring Report for this patient was reviewed today.   Periodic Controlled Substance Monitoring Possible medication side effects, risk of tolerance/dependence & alternative treatments discussed.;No signs of potential drug abuse or diversion identified: otherwise, see note documentation

## 2025-05-08 NOTE — TELEPHONE ENCOUNTER
Medication:   Requested Prescriptions     Pending Prescriptions Disp Refills    buPROPion (ZYBAN) 150 MG extended release tablet 180 tablet 3     Sig: TAKE 1 TABLET BY MOUTH TWICE DAILY        Last Filled:  11/4/24    Patient Phone Number: [phone] (home)     Last appt: 4/8/2025   Next appt: Visit date not found    Last OARRS:       5/3/2019     2:30 PM   RX Monitoring   Attestation The Prescription Monitoring Report for this patient was reviewed today.   Periodic Controlled Substance Monitoring Possible medication side effects, risk of tolerance/dependence & alternative treatments discussed.;No signs of potential drug abuse or diversion identified: otherwise, see note documentation

## 2025-05-09 DIAGNOSIS — I10 PRIMARY HYPERTENSION: Chronic | ICD-10-CM

## 2025-05-09 NOTE — TELEPHONE ENCOUNTER
Medication:   Requested Prescriptions     Pending Prescriptions Disp Refills    metoprolol tartrate (LOPRESSOR) 50 MG tablet [Pharmacy Med Name: METOPROLOL TARTRATE 50 MG TAB] 180 tablet 3     Sig: TAKE 1 TABLET BY MOUTH TWICE A DAY        Last Filled:  5/7/25    Patient Phone Number: [phone] (home)     Last appt: 4/8/2025   Next appt: Visit date not found    Last OARRS:       5/3/2019     2:30 PM   RX Monitoring   Attestation The Prescription Monitoring Report for this patient was reviewed today.   Periodic Controlled Substance Monitoring Possible medication side effects, risk of tolerance/dependence & alternative treatments discussed.;No signs of potential drug abuse or diversion identified: otherwise, see note documentation

## 2025-05-12 RX ORDER — BUPROPION HYDROCHLORIDE 150 MG/1
TABLET, FILM COATED, EXTENDED RELEASE ORAL
Qty: 180 TABLET | Refills: 3 | Status: SHIPPED | OUTPATIENT
Start: 2025-05-12

## 2025-05-12 RX ORDER — METOPROLOL TARTRATE 50 MG
50 TABLET ORAL 2 TIMES DAILY
Qty: 180 TABLET | Refills: 3 | Status: SHIPPED | OUTPATIENT
Start: 2025-05-12

## 2025-05-21 DIAGNOSIS — E11.40 TYPE 2 DIABETES MELLITUS WITH DIABETIC NEUROPATHY, WITHOUT LONG-TERM CURRENT USE OF INSULIN (HCC): ICD-10-CM

## 2025-05-21 NOTE — TELEPHONE ENCOUNTER
Medication:   Requested Prescriptions     Pending Prescriptions Disp Refills    gabapentin (NEURONTIN) 300 MG capsule 120 capsule 1     Sig: Take 2 capsules by mouth 2 times daily for 90 days.        Last Filled:  4/21/25    Patient Phone Number: [phone] (home)     Last appt: 4/8/2025   Next appt: Visit date not found    Last OARRS:       5/3/2019     2:30 PM   RX Monitoring   Attestation The Prescription Monitoring Report for this patient was reviewed today.   Periodic Controlled Substance Monitoring Possible medication side effects, risk of tolerance/dependence & alternative treatments discussed.;No signs of potential drug abuse or diversion identified: otherwise, see note documentation

## 2025-05-22 ENCOUNTER — TELEPHONE (OUTPATIENT)
Dept: FAMILY MEDICINE CLINIC | Age: 50
End: 2025-05-22

## 2025-05-22 RX ORDER — GABAPENTIN 300 MG/1
600 CAPSULE ORAL 2 TIMES DAILY
Qty: 120 CAPSULE | Refills: 1 | Status: SHIPPED | OUTPATIENT
Start: 2025-05-22 | End: 2025-08-20

## 2025-06-01 DIAGNOSIS — F41.8 DEPRESSION WITH ANXIETY: ICD-10-CM

## 2025-06-01 DIAGNOSIS — E11.65 UNCONTROLLED TYPE 2 DIABETES MELLITUS WITH HYPERGLYCEMIA (HCC): ICD-10-CM

## 2025-06-01 DIAGNOSIS — E11.40 TYPE 2 DIABETES MELLITUS WITH DIABETIC NEUROPATHY, WITHOUT LONG-TERM CURRENT USE OF INSULIN (HCC): ICD-10-CM

## 2025-06-02 NOTE — TELEPHONE ENCOUNTER
Medication:   Requested Prescriptions     Pending Prescriptions Disp Refills    sertraline (ZOLOFT) 50 MG tablet 90 tablet 1     Sig: TAKE 1 TABLET BY MOUTH DAILY    Tirzepatide (MOUNJARO) 7.5 MG/0.5ML SOAJ pen 2 mL 1     Si.5 mg SQ every week        Last Filled: 2024       Patient Phone Number: [phone] (home)     Last appt: 2025   Next appt: Visit date not found    Last OARRS:       5/3/2019     2:30 PM   RX Monitoring   Attestation The Prescription Monitoring Report for this patient was reviewed today.   Periodic Controlled Substance Monitoring Possible medication side effects, risk of tolerance/dependence & alternative treatments discussed.;No signs of potential drug abuse or diversion identified: otherwise, see note documentation

## 2025-06-16 NOTE — TELEPHONE ENCOUNTER
Requested Prescriptions     Pending Prescriptions Disp Refills    furosemide (LASIX) 40 MG tablet 90 tablet 3     Sig: Take 1 tablet by mouth daily            Checked Correct Pharmacy: Yes    Any changes since last refill? No     Number: 90  Refills: 3    Last OV: 6/12/2024 Provider: [facility]    Next OV: Visit date not found Provider: Needs appt    Last Labs:   BMP:  Lab Results   Component Value Date/Time     02/20/2025 01:53 PM    K 4.4 02/20/2025 01:53 PM    K 3.9 08/26/2023 07:29 AM     02/20/2025 01:53 PM    CO2 26 02/20/2025 01:53 PM    BUN 21 02/20/2025 01:53 PM    CREATININE 1.1 02/20/2025 01:53 PM    GLUCOSE 128 02/20/2025 01:53 PM    CALCIUM 9.8 02/20/2025 01:53 PM    LABGLOM 82 02/20/2025 01:53 PM    LABGLOM >60 03/16/2024 10:30 AM

## 2025-06-18 RX ORDER — FUROSEMIDE 40 MG/1
40 TABLET ORAL DAILY
Qty: 90 TABLET | Refills: 3 | Status: SHIPPED | OUTPATIENT
Start: 2025-06-18

## 2025-06-20 DIAGNOSIS — E11.40 TYPE 2 DIABETES MELLITUS WITH DIABETIC NEUROPATHY, WITHOUT LONG-TERM CURRENT USE OF INSULIN (HCC): ICD-10-CM

## 2025-06-20 DIAGNOSIS — E11.65 UNCONTROLLED TYPE 2 DIABETES MELLITUS WITH HYPERGLYCEMIA (HCC): ICD-10-CM

## 2025-06-20 LAB
ANION GAP SERPL CALCULATED.3IONS-SCNC: 11 MMOL/L (ref 3–16)
BUN SERPL-MCNC: 18 MG/DL (ref 7–20)
CALCIUM SERPL-MCNC: 10.2 MG/DL (ref 8.3–10.6)
CHLORIDE SERPL-SCNC: 97 MMOL/L (ref 99–110)
CO2 SERPL-SCNC: 28 MMOL/L (ref 21–32)
CREAT SERPL-MCNC: 1.2 MG/DL (ref 0.9–1.3)
CREAT UR-MCNC: 224 MG/DL (ref 39–259)
GFR SERPLBLD CREATININE-BSD FMLA CKD-EPI: 74 ML/MIN/{1.73_M2}
GLUCOSE SERPL-MCNC: 90 MG/DL (ref 70–99)
MICROALBUMIN UR DL<=1MG/L-MCNC: 30.5 MG/DL
MICROALBUMIN/CREAT UR: 136.2 MG/G (ref 0–30)
POTASSIUM SERPL-SCNC: 4.4 MMOL/L (ref 3.5–5.1)
SODIUM SERPL-SCNC: 136 MMOL/L (ref 136–145)

## 2025-06-21 LAB
EST. AVERAGE GLUCOSE BLD GHB EST-MCNC: 128.4 MG/DL
HBA1C MFR BLD: 6.1 %

## 2025-06-23 ENCOUNTER — RESULTS FOLLOW-UP (OUTPATIENT)
Dept: FAMILY MEDICINE CLINIC | Age: 50
End: 2025-06-23

## 2025-06-24 NOTE — TELEPHONE ENCOUNTER
----- Message from Dr. Marsha Uribe MD sent at 6/23/2025  1:31 PM EDT -----   Urine shows proteins which is abnormal but are improved and  this prob sec to DM/HTN    encourage compliance with meds /low sodium diet,   Med like Losartan and Mounjaro will help , continue meds   Repeat urine in 6 mo      Blood sugar, kidney, electrolytes are normal  A1c excellent 6.1! This at goal

## 2025-06-24 NOTE — TELEPHONE ENCOUNTER
[facility] call to [phone] (home)  lm informing patient to call the office for needed information stated below.

## 2025-06-25 NOTE — TELEPHONE ENCOUNTER
[facility] call to [phone] (home)  spoke with patient and informed him of Dr. Uribe's message stated below. Pt stated understanding.

## 2025-06-29 ENCOUNTER — PATIENT MESSAGE (OUTPATIENT)
Dept: FAMILY MEDICINE CLINIC | Age: 50
End: 2025-06-29

## 2025-07-01 DIAGNOSIS — E11.40 TYPE 2 DIABETES MELLITUS WITH DIABETIC NEUROPATHY, WITHOUT LONG-TERM CURRENT USE OF INSULIN (HCC): ICD-10-CM

## 2025-07-01 DIAGNOSIS — E11.65 UNCONTROLLED TYPE 2 DIABETES MELLITUS WITH HYPERGLYCEMIA (HCC): ICD-10-CM

## 2025-07-12 DIAGNOSIS — E11.65 UNCONTROLLED TYPE 2 DIABETES MELLITUS WITH HYPERGLYCEMIA (HCC): ICD-10-CM

## 2025-07-14 DIAGNOSIS — E11.65 UNCONTROLLED TYPE 2 DIABETES MELLITUS WITH HYPERGLYCEMIA (HCC): ICD-10-CM

## 2025-07-14 NOTE — TELEPHONE ENCOUNTER
Medication:   Requested Prescriptions     Pending Prescriptions Disp Refills    metFORMIN (GLUCOPHAGE) 500 MG tablet 180 tablet 0     Sig: TAKE 1 TABLET BY MOUTH TWICE A DAY BEFORE MEALS        Last Filled:  4/24/25    Patient Phone Number: [phone] (home)     Last appt: 4/8/2025   Next appt: Visit date not found    Last OARRS:       5/3/2019     2:30 PM   RX Monitoring   Attestation The Prescription Monitoring Report for this patient was reviewed today.   Periodic Controlled Substance Monitoring Possible medication side effects, risk of tolerance/dependence & alternative treatments discussed.;No signs of potential drug abuse or diversion identified: otherwise, see note documentation

## 2025-07-14 NOTE — TELEPHONE ENCOUNTER
Medication:   Requested Prescriptions     Pending Prescriptions Disp Refills    metFORMIN (GLUCOPHAGE) 500 MG tablet [Pharmacy Med Name: METFORMIN 500MG TABLETS] 180 tablet 0     Sig: TAKE 1 TABLET BY MOUTH TWICE DAILY BEFORE MEALS        Last Filled:      04/24/2025       Patient Phone Number: [phone] (home)     Last appt: 4/8/2025   Next appt: Visit date not found    Last OARRS:       5/3/2019     2:30 PM   RX Monitoring   Attestation The Prescription Monitoring Report for this patient was reviewed today.   Periodic Controlled Substance Monitoring Possible medication side effects, risk of tolerance/dependence & alternative treatments discussed.;No signs of potential drug abuse or diversion identified: otherwise, see note documentation

## 2025-07-18 DIAGNOSIS — E11.40 TYPE 2 DIABETES MELLITUS WITH DIABETIC NEUROPATHY, WITHOUT LONG-TERM CURRENT USE OF INSULIN (HCC): ICD-10-CM

## 2025-07-18 RX ORDER — GABAPENTIN 300 MG/1
600 CAPSULE ORAL 2 TIMES DAILY
Qty: 120 CAPSULE | Refills: 1 | Status: SHIPPED | OUTPATIENT
Start: 2025-07-18 | End: 2025-10-16

## 2025-07-18 NOTE — TELEPHONE ENCOUNTER
Medication:   Requested Prescriptions     Pending Prescriptions Disp Refills    gabapentin (NEURONTIN) 300 MG capsule 120 capsule 1     Sig: Take 2 capsules by mouth 2 times daily for 90 days.        Last Filled:  5/22/25    Patient Phone Number: [phone] (home)     Last appt: 4/8/2025   Next appt: Visit date not found    Last OARRS:       5/3/2019     2:30 PM   RX Monitoring   Attestation The Prescription Monitoring Report for this patient was reviewed today.   Periodic Controlled Substance Monitoring Possible medication side effects, risk of tolerance/dependence & alternative treatments discussed.;No signs of potential drug abuse or diversion identified: otherwise, see note documentation

## 2025-09-02 ENCOUNTER — HOSPITAL ENCOUNTER (EMERGENCY)
Age: 50
Discharge: HOME | End: 2025-09-02
Attending: EMERGENCY MEDICINE

## 2025-09-02 ENCOUNTER — APPOINTMENT (OUTPATIENT)
Dept: GENERAL RADIOLOGY | Age: 50
End: 2025-09-02

## 2025-09-02 VITALS
WEIGHT: 315 LBS | OXYGEN SATURATION: 95 % | RESPIRATION RATE: 18 BRPM | TEMPERATURE: 97.7 F | HEART RATE: 88 BPM | DIASTOLIC BLOOD PRESSURE: 71 MMHG | HEIGHT: 67 IN | SYSTOLIC BLOOD PRESSURE: 152 MMHG | BODY MASS INDEX: 49.44 KG/M2

## 2025-09-02 DIAGNOSIS — S93.401A SPRAIN OF RIGHT ANKLE, UNSPECIFIED LIGAMENT, INITIAL ENCOUNTER: Primary | ICD-10-CM

## 2025-09-02 PROBLEM — K56.609 SBO (SMALL BOWEL OBSTRUCTION) (HCC): Status: ACTIVE | Noted: 2023-03-28

## 2025-09-02 PROCEDURE — 99284 EMERGENCY DEPT VISIT MOD MDM: CPT

## 2025-09-02 PROCEDURE — 96372 THER/PROPH/DIAG INJ SC/IM: CPT

## 2025-09-02 PROCEDURE — 6360000002 HC RX W HCPCS: Performed by: PHYSICIAN ASSISTANT

## 2025-09-02 PROCEDURE — 73630 X-RAY EXAM OF FOOT: CPT

## 2025-09-02 PROCEDURE — 73610 X-RAY EXAM OF ANKLE: CPT

## 2025-09-02 RX ORDER — KETOROLAC TROMETHAMINE 30 MG/ML
15 INJECTION, SOLUTION INTRAMUSCULAR; INTRAVENOUS ONCE
Status: COMPLETED | OUTPATIENT
Start: 2025-09-02 | End: 2025-09-02

## 2025-09-02 RX ADMIN — KETOROLAC TROMETHAMINE 15 MG: 30 INJECTION, SOLUTION INTRAMUSCULAR at 20:22

## 2025-09-02 ASSESSMENT — PAIN DESCRIPTION - FREQUENCY: FREQUENCY: CONTINUOUS

## 2025-09-02 ASSESSMENT — PAIN DESCRIPTION - ORIENTATION: ORIENTATION: RIGHT

## 2025-09-02 ASSESSMENT — ENCOUNTER SYMPTOMS
SHORTNESS OF BREATH: 0
DIARRHEA: 0
WHEEZING: 0
NAUSEA: 0
SORE THROAT: 0
VOMITING: 0
ABDOMINAL PAIN: 0
COUGH: 0

## 2025-09-02 ASSESSMENT — PAIN DESCRIPTION - PAIN TYPE: TYPE: ACUTE PAIN

## 2025-09-02 ASSESSMENT — PAIN - FUNCTIONAL ASSESSMENT: PAIN_FUNCTIONAL_ASSESSMENT: 0-10

## 2025-09-02 ASSESSMENT — PAIN DESCRIPTION - LOCATION: LOCATION: ANKLE;FOOT

## 2025-09-02 ASSESSMENT — PAIN SCALES - GENERAL: PAINLEVEL_OUTOF10: 5
